# Patient Record
Sex: FEMALE | Race: WHITE | NOT HISPANIC OR LATINO
[De-identification: names, ages, dates, MRNs, and addresses within clinical notes are randomized per-mention and may not be internally consistent; named-entity substitution may affect disease eponyms.]

---

## 2017-06-13 ENCOUNTER — APPOINTMENT (OUTPATIENT)
Dept: DERMATOLOGY | Facility: CLINIC | Age: 47
End: 2017-06-13

## 2017-06-30 ENCOUNTER — APPOINTMENT (OUTPATIENT)
Dept: INTERNAL MEDICINE | Facility: CLINIC | Age: 47
End: 2017-06-30

## 2017-06-30 VITALS
RESPIRATION RATE: 14 BRPM | WEIGHT: 86 LBS | DIASTOLIC BLOOD PRESSURE: 90 MMHG | OXYGEN SATURATION: 99 % | SYSTOLIC BLOOD PRESSURE: 150 MMHG | HEIGHT: 61 IN | BODY MASS INDEX: 16.24 KG/M2 | TEMPERATURE: 98.8 F | HEART RATE: 83 BPM

## 2017-06-30 VITALS — SYSTOLIC BLOOD PRESSURE: 148 MMHG | DIASTOLIC BLOOD PRESSURE: 88 MMHG

## 2017-06-30 DIAGNOSIS — K21.9 GASTRO-ESOPHAGEAL REFLUX DISEASE W/OUT ESOPHAGITIS: ICD-10-CM

## 2017-06-30 DIAGNOSIS — R49.0 DYSPHONIA: ICD-10-CM

## 2017-06-30 DIAGNOSIS — Z78.0 ASYMPTOMATIC MENOPAUSAL STATE: ICD-10-CM

## 2017-10-26 ENCOUNTER — TRANSCRIPTION ENCOUNTER (OUTPATIENT)
Age: 47
End: 2017-10-26

## 2017-11-08 ENCOUNTER — TRANSCRIPTION ENCOUNTER (OUTPATIENT)
Age: 47
End: 2017-11-08

## 2017-12-26 ENCOUNTER — TRANSCRIPTION ENCOUNTER (OUTPATIENT)
Age: 47
End: 2017-12-26

## 2018-02-05 ENCOUNTER — TRANSCRIPTION ENCOUNTER (OUTPATIENT)
Age: 48
End: 2018-02-05

## 2018-02-16 ENCOUNTER — TRANSCRIPTION ENCOUNTER (OUTPATIENT)
Age: 48
End: 2018-02-16

## 2018-07-27 ENCOUNTER — TRANSCRIPTION ENCOUNTER (OUTPATIENT)
Age: 48
End: 2018-07-27

## 2019-03-12 ENCOUNTER — LABORATORY RESULT (OUTPATIENT)
Age: 49
End: 2019-03-12

## 2019-03-12 ENCOUNTER — APPOINTMENT (OUTPATIENT)
Dept: INTERNAL MEDICINE | Facility: CLINIC | Age: 49
End: 2019-03-12
Payer: COMMERCIAL

## 2019-03-12 ENCOUNTER — NON-APPOINTMENT (OUTPATIENT)
Age: 49
End: 2019-03-12

## 2019-03-12 VITALS
DIASTOLIC BLOOD PRESSURE: 70 MMHG | RESPIRATION RATE: 14 BRPM | SYSTOLIC BLOOD PRESSURE: 100 MMHG | HEIGHT: 61 IN | OXYGEN SATURATION: 97 % | WEIGHT: 80 LBS | BODY MASS INDEX: 15.11 KG/M2 | TEMPERATURE: 98 F | HEART RATE: 60 BPM

## 2019-03-12 DIAGNOSIS — R63.0 ANOREXIA: ICD-10-CM

## 2019-03-12 PROCEDURE — 99396 PREV VISIT EST AGE 40-64: CPT | Mod: 25

## 2019-03-12 PROCEDURE — 93000 ELECTROCARDIOGRAM COMPLETE: CPT

## 2019-03-12 RX ORDER — HYDROXYZINE PAMOATE 50 MG/1
50 CAPSULE ORAL
Qty: 90 | Refills: 0 | Status: DISCONTINUED | COMMUNITY
Start: 2017-06-26 | End: 2019-03-12

## 2019-03-12 RX ORDER — GABAPENTIN 400 MG/1
400 CAPSULE ORAL
Qty: 90 | Refills: 0 | Status: DISCONTINUED | COMMUNITY
Start: 2017-03-27 | End: 2019-03-12

## 2019-03-12 RX ORDER — OLANZAPINE 5 MG/1
5 TABLET, FILM COATED ORAL
Qty: 90 | Refills: 0 | Status: DISCONTINUED | COMMUNITY
Start: 2017-03-27 | End: 2019-03-12

## 2019-03-12 RX ORDER — FLUOXETINE HYDROCHLORIDE 10 MG/1
10 CAPSULE ORAL
Qty: 30 | Refills: 0 | Status: DISCONTINUED | COMMUNITY
Start: 2017-06-05 | End: 2019-03-12

## 2019-03-12 RX ORDER — QUETIAPINE FUMARATE 50 MG/1
50 TABLET ORAL
Qty: 60 | Refills: 0 | Status: DISCONTINUED | COMMUNITY
Start: 2017-06-23 | End: 2019-03-12

## 2019-03-12 RX ORDER — HYDROXYZINE PAMOATE 25 MG/1
25 CAPSULE ORAL
Qty: 20 | Refills: 0 | Status: DISCONTINUED | COMMUNITY
Start: 2017-04-28 | End: 2019-03-12

## 2019-03-12 RX ORDER — GABAPENTIN 300 MG/1
300 CAPSULE ORAL
Qty: 90 | Refills: 0 | Status: DISCONTINUED | COMMUNITY
Start: 2017-04-28 | End: 2019-03-12

## 2019-03-12 RX ORDER — OLANZAPINE 10 MG/1
10 TABLET, FILM COATED ORAL
Qty: 30 | Refills: 0 | Status: DISCONTINUED | COMMUNITY
Start: 2017-03-27 | End: 2019-03-12

## 2019-03-12 RX ORDER — FLUOXETINE HYDROCHLORIDE 20 MG/1
20 CAPSULE ORAL
Qty: 30 | Refills: 0 | Status: DISCONTINUED | COMMUNITY
Start: 2017-05-24 | End: 2019-03-12

## 2019-03-12 RX ORDER — VENLAFAXINE HYDROCHLORIDE 37.5 MG/1
37.5 CAPSULE, EXTENDED RELEASE ORAL
Qty: 30 | Refills: 0 | Status: DISCONTINUED | COMMUNITY
Start: 2017-04-06 | End: 2019-03-12

## 2019-03-12 RX ORDER — BUSPIRONE HYDROCHLORIDE 15 MG/1
15 TABLET ORAL
Qty: 180 | Refills: 0 | Status: DISCONTINUED | COMMUNITY
Start: 2017-05-17 | End: 2019-03-12

## 2019-03-12 RX ORDER — ZOLPIDEM TARTRATE 6.25 MG/1
6.25 TABLET, EXTENDED RELEASE ORAL
Qty: 60 | Refills: 0 | Status: DISCONTINUED | COMMUNITY
Start: 2017-05-01 | End: 2019-03-12

## 2019-03-12 RX ORDER — QUETIAPINE FUMARATE 25 MG/1
25 TABLET ORAL
Qty: 90 | Refills: 0 | Status: DISCONTINUED | COMMUNITY
Start: 2017-06-26 | End: 2019-03-12

## 2019-03-12 NOTE — REVIEW OF SYSTEMS
[Recent Change In Weight] : ~T recent weight change [Insomnia] : insomnia [Anxiety] : anxiety [Negative] : Heme/Lymph [Fever] : no fever [Chills] : no chills [Fatigue] : no fatigue [Headache] : no headache [Dizziness] : no dizziness [FreeTextEntry9] : shoulder pain

## 2019-03-12 NOTE — PHYSICAL EXAM
[No Acute Distress] : no acute distress [Well Nourished] : well nourished [Well Developed] : well developed [Well-Appearing] : well-appearing [Normal Sclera/Conjunctiva] : normal sclera/conjunctiva [PERRL] : pupils equal round and reactive to light [Normal Oropharynx] : the oropharynx was normal [Normal TMs] : both tympanic membranes were normal [Supple] : supple [No Lymphadenopathy] : no lymphadenopathy [Thyroid Normal, No Nodules] : the thyroid was normal and there were no nodules present [No Respiratory Distress] : no respiratory distress  [Clear to Auscultation] : lungs were clear to auscultation bilaterally [Normal Rate] : normal rate  [Regular Rhythm] : with a regular rhythm [Normal S1, S2] : normal S1 and S2 [No Edema] : there was no peripheral edema [Normal Appearance] : normal in appearance [No Masses] : no palpable masses [Soft] : abdomen soft [Non Tender] : non-tender [No CVA Tenderness] : no CVA  tenderness [No Spinal Tenderness] : no spinal tenderness [No Joint Swelling] : no joint swelling [Grossly Normal Strength/Tone] : grossly normal strength/tone [No Rash] : no rash [Normal Gait] : normal gait [Coordination Grossly Intact] : coordination grossly intact [No Focal Deficits] : no focal deficits [Deep Tendon Reflexes (DTR)] : deep tendon reflexes were 2+ and symmetric [Normal Affect] : the affect was normal [Normal Insight/Judgement] : insight and judgment were intact [Cachectic] : cachexia was observed

## 2019-03-12 NOTE — HEALTH RISK ASSESSMENT
[0] : 2) Feeling down, depressed, or hopeless: Not at all (0) [] : No [de-identified] : one drink at dinner weekly

## 2019-03-12 NOTE — HISTORY OF PRESENT ILLNESS
[FreeTextEntry1] : cpe [de-identified] : Pt is here for cpe. She was in [place] with her family and then last week had the flu. She continues to have a little hoarseness. \par \par Pt is seeing a psychologist weekly for the last year and a psychiatrist - Dr Augustin. \par \par LMP was before she had children. Her children were conceived with donor eggs. her children are 6.5 and almost 9. Did have regular periods. She had some postmenopausal bleeding and was seen by gyn recently.

## 2019-03-13 DIAGNOSIS — Z00.00 ENCOUNTER FOR GENERAL ADULT MEDICAL EXAMINATION W/OUT ABNORMAL FINDINGS: ICD-10-CM

## 2019-03-13 LAB
25(OH)D3 SERPL-MCNC: 48.7 NG/ML
ALBUMIN SERPL ELPH-MCNC: 5.2 G/DL
ALP BLD-CCNC: 65 U/L
ALT SERPL-CCNC: 20 U/L
ANION GAP SERPL CALC-SCNC: 14 MMOL/L
APPEARANCE: CLEAR
AST SERPL-CCNC: 21 U/L
BASOPHILS # BLD AUTO: 0.02 K/UL
BASOPHILS NFR BLD AUTO: 0.4 %
BILIRUB SERPL-MCNC: 0.6 MG/DL
BILIRUBIN URINE: NEGATIVE
BLOOD URINE: NEGATIVE
BUN SERPL-MCNC: 13 MG/DL
CALCIUM SERPL-MCNC: 10.5 MG/DL
CHLORIDE SERPL-SCNC: 98 MMOL/L
CHOLEST SERPL-MCNC: 221 MG/DL
CHOLEST/HDLC SERPL: 2.3 RATIO
CO2 SERPL-SCNC: 27 MMOL/L
COLOR: YELLOW
CREAT SERPL-MCNC: 0.67 MG/DL
EOSINOPHIL # BLD AUTO: 0.08 K/UL
EOSINOPHIL NFR BLD AUTO: 1.7 %
FERRITIN SERPL-MCNC: 99 NG/ML
FOLATE SERPL-MCNC: >20 NG/ML
GLIADIN IGA SER QL: 6.1 UNITS
GLIADIN IGG SER QL: <5 UNITS
GLIADIN PEPTIDE IGA SER-ACNC: NEGATIVE
GLIADIN PEPTIDE IGG SER-ACNC: NEGATIVE
GLUCOSE QUALITATIVE U: NEGATIVE
GLUCOSE SERPL-MCNC: 84 MG/DL
HBA1C MFR BLD HPLC: 4.4 %
HCT VFR BLD CALC: 44.2 %
HDLC SERPL-MCNC: 96 MG/DL
HGB BLD-MCNC: 15.4 G/DL
IGA SER QL IEP: 250 MG/DL
IMM GRANULOCYTES NFR BLD AUTO: 0.2 %
IRON SATN MFR SERPL: 63 %
IRON SERPL-MCNC: 202 UG/DL
KETONES URINE: NEGATIVE
LDLC SERPL CALC-MCNC: 100 MG/DL
LEUKOCYTE ESTERASE URINE: ABNORMAL
LYMPHOCYTES # BLD AUTO: 1.74 K/UL
LYMPHOCYTES NFR BLD AUTO: 36.9 %
MAGNESIUM SERPL-MCNC: 1.7 MG/DL
MAN DIFF?: NORMAL
MCHC RBC-ENTMCNC: 33.8 PG
MCHC RBC-ENTMCNC: 34.8 GM/DL
MCV RBC AUTO: 97.1 FL
MONOCYTES # BLD AUTO: 0.42 K/UL
MONOCYTES NFR BLD AUTO: 8.9 %
NEUTROPHILS # BLD AUTO: 2.44 K/UL
NEUTROPHILS NFR BLD AUTO: 51.9 %
NITRITE URINE: NEGATIVE
PH URINE: 6
PHOSPHATE SERPL-MCNC: 4.7 MG/DL
PLATELET # BLD AUTO: 247 K/UL
POTASSIUM SERPL-SCNC: 4.5 MMOL/L
PROT SERPL-MCNC: 7.5 G/DL
PROTEIN URINE: ABNORMAL
RBC # BLD: 4.55 M/UL
RBC # BLD: 4.55 M/UL
RBC # FLD: 12.2 %
RETICS # AUTO: 1.8 %
RETICS AGGREG/RBC NFR: 81.9 K/UL
SODIUM SERPL-SCNC: 139 MMOL/L
SPECIFIC GRAVITY URINE: 1.04
TIBC SERPL-MCNC: 320 UG/DL
TRIGL SERPL-MCNC: 124 MG/DL
TSH SERPL-ACNC: 2.91 UIU/ML
TTG IGA SER IA-ACNC: <1.2 U/ML
TTG IGA SER-ACNC: NEGATIVE
TTG IGG SER IA-ACNC: 1.3 U/ML
TTG IGG SER IA-ACNC: NEGATIVE
UIBC SERPL-MCNC: 118 UG/DL
URATE SERPL-MCNC: 3.7 MG/DL
UROBILINOGEN URINE: NORMAL
VIT B12 SERPL-MCNC: 682 PG/ML
WBC # FLD AUTO: 4.71 K/UL

## 2019-03-15 LAB
ENDOMYSIUM IGA SER QL: NEGATIVE
ENDOMYSIUM IGA TITR SER: NORMAL

## 2019-03-17 ENCOUNTER — TRANSCRIPTION ENCOUNTER (OUTPATIENT)
Age: 49
End: 2019-03-17

## 2019-04-04 ENCOUNTER — FORM ENCOUNTER (OUTPATIENT)
Age: 49
End: 2019-04-04

## 2019-04-05 ENCOUNTER — APPOINTMENT (OUTPATIENT)
Dept: RADIOLOGY | Facility: CLINIC | Age: 49
End: 2019-04-05
Payer: COMMERCIAL

## 2019-04-05 ENCOUNTER — APPOINTMENT (OUTPATIENT)
Dept: MAMMOGRAPHY | Facility: CLINIC | Age: 49
End: 2019-04-05
Payer: COMMERCIAL

## 2019-04-05 ENCOUNTER — OUTPATIENT (OUTPATIENT)
Dept: OUTPATIENT SERVICES | Facility: HOSPITAL | Age: 49
LOS: 1 days | End: 2019-04-05
Payer: COMMERCIAL

## 2019-04-05 DIAGNOSIS — N80.9 ENDOMETRIOSIS, UNSPECIFIED: Chronic | ICD-10-CM

## 2019-04-05 DIAGNOSIS — Z00.00 ENCOUNTER FOR GENERAL ADULT MEDICAL EXAMINATION WITHOUT ABNORMAL FINDINGS: ICD-10-CM

## 2019-04-05 PROCEDURE — 77063 BREAST TOMOSYNTHESIS BI: CPT

## 2019-04-05 PROCEDURE — 77067 SCR MAMMO BI INCL CAD: CPT

## 2019-04-05 PROCEDURE — 77063 BREAST TOMOSYNTHESIS BI: CPT | Mod: 26

## 2019-04-05 PROCEDURE — 77080 DXA BONE DENSITY AXIAL: CPT | Mod: 26

## 2019-04-05 PROCEDURE — 77067 SCR MAMMO BI INCL CAD: CPT | Mod: 26

## 2019-04-05 PROCEDURE — 77080 DXA BONE DENSITY AXIAL: CPT

## 2019-05-10 PROCEDURE — 90792 PSYCH DIAG EVAL W/MED SRVCS: CPT | Mod: GT

## 2019-05-11 ENCOUNTER — EMERGENCY (EMERGENCY)
Facility: HOSPITAL | Age: 49
LOS: 1 days | Discharge: HOME | End: 2019-05-11
Attending: INTERNAL MEDICINE | Admitting: INTERNAL MEDICINE
Payer: COMMERCIAL

## 2019-05-11 VITALS
OXYGEN SATURATION: 100 % | HEART RATE: 60 BPM | SYSTOLIC BLOOD PRESSURE: 127 MMHG | WEIGHT: 84 LBS | RESPIRATION RATE: 18 BRPM | DIASTOLIC BLOOD PRESSURE: 84 MMHG | TEMPERATURE: 98 F | HEIGHT: 60 IN

## 2019-05-11 VITALS
RESPIRATION RATE: 16 BRPM | OXYGEN SATURATION: 99 % | DIASTOLIC BLOOD PRESSURE: 60 MMHG | TEMPERATURE: 98 F | SYSTOLIC BLOOD PRESSURE: 99 MMHG | HEART RATE: 70 BPM

## 2019-05-11 DIAGNOSIS — N80.9 ENDOMETRIOSIS, UNSPECIFIED: Chronic | ICD-10-CM

## 2019-05-11 DIAGNOSIS — F13.10 SEDATIVE, HYPNOTIC OR ANXIOLYTIC ABUSE, UNCOMPLICATED: ICD-10-CM

## 2019-05-11 DIAGNOSIS — F41.0 PANIC DISORDER [EPISODIC PAROXYSMAL ANXIETY]: ICD-10-CM

## 2019-05-11 LAB
ALBUMIN SERPL ELPH-MCNC: 4 G/DL — SIGNIFICANT CHANGE UP (ref 3.3–5)
ALP SERPL-CCNC: 65 U/L — SIGNIFICANT CHANGE UP (ref 30–120)
ALT FLD-CCNC: 47 U/L DA — SIGNIFICANT CHANGE UP (ref 10–60)
AMPHET UR-MCNC: NEGATIVE — SIGNIFICANT CHANGE UP
ANION GAP SERPL CALC-SCNC: 6 MMOL/L — SIGNIFICANT CHANGE UP (ref 5–17)
APAP SERPL-MCNC: 5 UG/ML — LOW (ref 10–30)
APPEARANCE UR: CLEAR — SIGNIFICANT CHANGE UP
APTT BLD: 29.5 SEC — SIGNIFICANT CHANGE UP (ref 28.5–37)
AST SERPL-CCNC: 56 U/L — HIGH (ref 10–40)
BARBITURATES UR SCN-MCNC: NEGATIVE — SIGNIFICANT CHANGE UP
BASOPHILS # BLD AUTO: 0.02 K/UL — SIGNIFICANT CHANGE UP (ref 0–0.2)
BASOPHILS NFR BLD AUTO: 0.2 % — SIGNIFICANT CHANGE UP (ref 0–2)
BENZODIAZ UR-MCNC: POSITIVE
BILIRUB SERPL-MCNC: 0.8 MG/DL — SIGNIFICANT CHANGE UP (ref 0.2–1.2)
BILIRUB UR-MCNC: NEGATIVE — SIGNIFICANT CHANGE UP
BUN SERPL-MCNC: 7 MG/DL — SIGNIFICANT CHANGE UP (ref 7–23)
CALCIUM SERPL-MCNC: 8.5 MG/DL — SIGNIFICANT CHANGE UP (ref 8.4–10.5)
CHLORIDE SERPL-SCNC: 90 MMOL/L — LOW (ref 96–108)
CO2 SERPL-SCNC: 33 MMOL/L — HIGH (ref 22–31)
COCAINE METAB.OTHER UR-MCNC: NEGATIVE — SIGNIFICANT CHANGE UP
COLOR SPEC: YELLOW — SIGNIFICANT CHANGE UP
CREAT SERPL-MCNC: 0.52 MG/DL — SIGNIFICANT CHANGE UP (ref 0.5–1.3)
DIFF PNL FLD: NEGATIVE — SIGNIFICANT CHANGE UP
EOSINOPHIL # BLD AUTO: 0.07 K/UL — SIGNIFICANT CHANGE UP (ref 0–0.5)
EOSINOPHIL NFR BLD AUTO: 0.7 % — SIGNIFICANT CHANGE UP (ref 0–6)
ETHANOL SERPL-MCNC: <3 MG/DL — SIGNIFICANT CHANGE UP (ref 0–3)
GLUCOSE SERPL-MCNC: 73 MG/DL — SIGNIFICANT CHANGE UP (ref 70–99)
GLUCOSE UR QL: NEGATIVE MG/DL — SIGNIFICANT CHANGE UP
HCT VFR BLD CALC: 34.9 % — SIGNIFICANT CHANGE UP (ref 34.5–45)
HGB BLD-MCNC: 13 G/DL — SIGNIFICANT CHANGE UP (ref 11.5–15.5)
IMM GRANULOCYTES NFR BLD AUTO: 0.5 % — SIGNIFICANT CHANGE UP (ref 0–1.5)
INR BLD: 1.12 RATIO — SIGNIFICANT CHANGE UP (ref 0.88–1.16)
KETONES UR-MCNC: NEGATIVE — SIGNIFICANT CHANGE UP
LEUKOCYTE ESTERASE UR-ACNC: NEGATIVE — SIGNIFICANT CHANGE UP
LYMPHOCYTES # BLD AUTO: 1.37 K/UL — SIGNIFICANT CHANGE UP (ref 1–3.3)
LYMPHOCYTES # BLD AUTO: 13 % — SIGNIFICANT CHANGE UP (ref 13–44)
MCHC RBC-ENTMCNC: 34.7 PG — HIGH (ref 27–34)
MCHC RBC-ENTMCNC: 37.2 GM/DL — HIGH (ref 32–36)
MCV RBC AUTO: 93.1 FL — SIGNIFICANT CHANGE UP (ref 80–100)
METHADONE UR-MCNC: NEGATIVE — SIGNIFICANT CHANGE UP
MONOCYTES # BLD AUTO: 0.7 K/UL — SIGNIFICANT CHANGE UP (ref 0–0.9)
MONOCYTES NFR BLD AUTO: 6.6 % — SIGNIFICANT CHANGE UP (ref 2–14)
NEUTROPHILS # BLD AUTO: 8.32 K/UL — HIGH (ref 1.8–7.4)
NEUTROPHILS NFR BLD AUTO: 79 % — HIGH (ref 43–77)
NITRITE UR-MCNC: NEGATIVE — SIGNIFICANT CHANGE UP
NRBC # BLD: 0 /100 WBCS — SIGNIFICANT CHANGE UP (ref 0–0)
OPIATES UR-MCNC: NEGATIVE — SIGNIFICANT CHANGE UP
PCP SPEC-MCNC: SIGNIFICANT CHANGE UP
PCP UR-MCNC: NEGATIVE — SIGNIFICANT CHANGE UP
PH UR: 6.5 — SIGNIFICANT CHANGE UP (ref 5–8)
PLATELET # BLD AUTO: 176 K/UL — SIGNIFICANT CHANGE UP (ref 150–400)
POTASSIUM SERPL-MCNC: 3.8 MMOL/L — SIGNIFICANT CHANGE UP (ref 3.5–5.3)
POTASSIUM SERPL-SCNC: 3.8 MMOL/L — SIGNIFICANT CHANGE UP (ref 3.5–5.3)
PROT SERPL-MCNC: 6.7 G/DL — SIGNIFICANT CHANGE UP (ref 6–8.3)
PROT UR-MCNC: NEGATIVE MG/DL — SIGNIFICANT CHANGE UP
PROTHROM AB SERPL-ACNC: 12.3 SEC — SIGNIFICANT CHANGE UP (ref 10–12.9)
RBC # BLD: 3.75 M/UL — LOW (ref 3.8–5.2)
RBC # FLD: 10.4 % — SIGNIFICANT CHANGE UP (ref 10.3–14.5)
SALICYLATES SERPL-MCNC: <0.2 MG/DL — LOW (ref 2.8–20)
SODIUM SERPL-SCNC: 129 MMOL/L — LOW (ref 135–145)
SP GR SPEC: 1 — LOW (ref 1.01–1.02)
THC UR QL: NEGATIVE — SIGNIFICANT CHANGE UP
UROBILINOGEN FLD QL: NEGATIVE MG/DL — SIGNIFICANT CHANGE UP
WBC # BLD: 10.53 K/UL — HIGH (ref 3.8–10.5)
WBC # FLD AUTO: 10.53 K/UL — HIGH (ref 3.8–10.5)

## 2019-05-11 PROCEDURE — 85027 COMPLETE CBC AUTOMATED: CPT

## 2019-05-11 PROCEDURE — 80307 DRUG TEST PRSMV CHEM ANLYZR: CPT

## 2019-05-11 PROCEDURE — 93005 ELECTROCARDIOGRAM TRACING: CPT

## 2019-05-11 PROCEDURE — 99285 EMERGENCY DEPT VISIT HI MDM: CPT | Mod: 25

## 2019-05-11 PROCEDURE — 93010 ELECTROCARDIOGRAM REPORT: CPT

## 2019-05-11 PROCEDURE — 99285 EMERGENCY DEPT VISIT HI MDM: CPT

## 2019-05-11 PROCEDURE — 80053 COMPREHEN METABOLIC PANEL: CPT

## 2019-05-11 PROCEDURE — 85730 THROMBOPLASTIN TIME PARTIAL: CPT

## 2019-05-11 PROCEDURE — 85610 PROTHROMBIN TIME: CPT

## 2019-05-11 PROCEDURE — 81003 URINALYSIS AUTO W/O SCOPE: CPT

## 2019-05-11 PROCEDURE — 36415 COLL VENOUS BLD VENIPUNCTURE: CPT

## 2019-05-11 NOTE — ED ADULT NURSE REASSESSMENT NOTE - NS ED NURSE REASSESS COMMENT FT1
Pt requesting an Rx from ED physician for Valium. Pt states she does not have any more at home and needs some. Pt actually has bag of small blue pills loose in a Zipzoc bag, approximately 30; Pt states they are 5mg Valium and that she takes 8 per day so these will not last. Pt advised to call and schedule an appointment to see her PMD.

## 2019-05-11 NOTE — ED PROVIDER NOTE - OBJECTIVE STATEMENT
47 y/o WF h/o alcohol abuse, drug dependance, depression. States that after taking her usual dose of valium 40mg,  she was experiencing increasing shakiness. She took extra unknown  doses of the valium to control the shaking. Her concern is that the valium 10mg tabs from the [facility] has a different potency or that she is taking an unknown substance. She admits to drinking alcohol today after a prolonged abstinence. She is in the emergency room with a friend. The patient is not expressing homicidal or suicidal ideation.  She has two children at home and they are in the care of her  49 y/o WF h/o alcohol abuse, drug dependance, depression. States that after taking her usual dose of valium 40mg and lexapro,  she was experiencing increasing shakiness. She took extra unknown  doses of the valium to control the shaking. Her concern is that the valium 10mg tabs from the [facility] has a different potency or that she is taking an unknown substance. She admits to drinking alcohol today after a prolonged abstinence. She is in the emergency room with a friend. The patient is not expressing homicidal or suicidal ideation.  She has two children at home and they are in the care of her . 47 y/o WF h/o alcohol abuse, drug dependance, depression. States that after taking her usual dose of valium 40mg and lexapro,  she was experiencing increasing shakiness. She took extra unknown  doses of the valium to control the shaking. Her concern is that the valium 10mg tabs from the [facility] has a different potency or that she is taking an unknown substance. She admits to drinking alcohol today after a prolonged abstinence. She is in the emergency room with a friend. The patient is not expressing homicidal or suicidal ideation.  She has two children at home and they are in the care of her .  The patient is carrying approximately 30-40 tabs of valium 10mg in a zip lock bag.

## 2019-05-11 NOTE — ED ADULT NURSE NOTE - NSIMPLEMENTINTERV_GEN_ALL_ED
Implemented All Fall with Harm Risk Interventions:  [place] to call system. Call bell, personal items and telephone within reach. Instruct patient to call for assistance. Room bathroom lighting operational. Non-slip footwear when patient is off stretcher. Physically safe environment: no spills, clutter or unnecessary equipment. Stretcher in lowest position, wheels locked, appropriate side rails in place. Provide visual cue, wrist band, yellow gown, etc. Monitor gait and stability. Monitor for mental status changes and reorient to person, place, and time. Review medications for side effects contributing to fall risk. Reinforce activity limits and safety measures with patient and family. Provide visual clues: red socks.

## 2019-05-11 NOTE — ED BEHAVIORAL HEALTH ASSESSMENT NOTE - DESCRIPTION (FIRST USE, LAST USE, QUANTITY, FREQUENCY, DURATION)
h/o ETOH abuse and [facility]. was sober for 11mo after than and then began to drink socially with  in the evenings or out with friends

## 2019-05-11 NOTE — ED BEHAVIORAL HEALTH ASSESSMENT NOTE - OTHER PAST PSYCHIATRIC HISTORY (INCLUDE DETAILS REGARDING ONSET, COURSE OF ILLNESS, INPATIENT/OUTPATIENT TREATMENT)
has been in therapy and in outpatient med management since childhood for anxiety. family stated that pt has a h/o eating disorder but pt does not report this.     see HPI for more details     message left for RAISSA Blevins [phone]

## 2019-05-11 NOTE — ED ADULT NURSE NOTE - OBJECTIVE STATEMENT
"I took valium and now I can't see" "I took valium and now I can't see" pt ststes she gets valium from [facility], states she took several pills today with alcohol and ambien

## 2019-05-11 NOTE — ED BEHAVIORAL HEALTH ASSESSMENT NOTE - HPI (INCLUDE ILLNESS QUALITY, SEVERITY, DURATION, TIMING, CONTEXT, MODIFYING FACTORS, ASSOCIATED SIGNS AND SYMPTOMS)
47yo MWF, domiciled with her  and 2 children, homemaker, medical hx of alopecia and ulcers, pphx of long standing anxiety from childhood and h/o eating disorder, 1 prior ER visit after pt threatened to cut her wrist after rehab when she was tappered off benzo's, recently switched providers from Dr. Parks to RAISSA Blevins after being dismissed from the practice for unknown reason, in therapy for several yrs with Dr. Baez, currently on lexapro, ambien and obtains valium from the [facility], no h/o SA or SIB, h/o ETOH abuse with 1 prior rehab at [facility] 2yrs ago, no h/o withdrawal, no legal hx, no violence hx, presented to the ER with high anxiety because she feels that she had an adverse reaction to valium.    The pt states that she has been tried on multiple anti-anxiety medications and antidepressants but nothing worked as well as valium. 2rys ago her anxiety increased and she began drinking during the day. after going to rehab, she was tapered off valium and states that she could not find a doctor to prescribe it. she reports becoming highly anxious, not being able to sleep and needing a family member to be home with her at all times. out of desperation she said that she would cut her wrist without the benzo's (denied intent) but family called 911 and brought her to [facility]. pt then googled how to obtain valium [place] past 2 [place]. pt said that she has been the happiest she has ever been on valium 40mg in divided doses daily. she states that her anxiety was vastly improved and allowed her to be a good mother. despite this, pt has felt guilty about keeping this secret and it became a burden to hide these pills. pt said that she told her long time therapist about this just last week. unclear if the reason pt was discharged from her psychiatrist was related to this.  pt said that today out of the blue she bought a travel sized bottle of alcohol and then could not do her usual breathalizer (does this TID and facetimes with her ). she tried to make excuses for this but then had a panic attack and called a friend who brought her to the ER. pt's view is that she had bad valium which made her anxious. in the ER the pt is asking for valium, wanting to know which providers can prescribe for her and anxious that she will have a withdrawal seizure. she is not interested in rehab/detox.  pt is denying depressed mood, si/intent/plan (nursing note says SI but pt denied this to ER attending and writer). normally pt is able to sleep well, complete ADLs and focus. she is anxious to get home before her children wake up and they are her reason for living. she also has a supportive family with siblings and parents.   denied symptoms of bradley including decreased need for sleep, increased goal directed activity, grandiosity. no psychotic symptoms of avh or paranoid delusions. no HI.    Daniela Blevins [phone]

## 2019-05-11 NOTE — ED BEHAVIORAL HEALTH ASSESSMENT NOTE - RISK ASSESSMENT
Acute Suicide Risk  (  ) High   (x  ) Moderate   (  ) Low   (  ) Unable to determine   Rationale ___________moderate to low risk. not reporting any SI however has made provocative statements about cutting wrist when feeling desperate that she will not have her benzo's and this may be a possibility now that her family is aware that she is obtaining them illegally. pt firmly denies any intent to end her life, has a supportive family and children to live for. she has no h/o SA or SIB     Elevated Chronic Risk   ( x ) Yes ___________substance abuse with poor insight is a chronic risk factor  Details ___________  (  ) No   ___________     Safety Plan   Details: ___________  [  ] Safety plan discussed with patient  [  ] Education provided regarding environmental safety / lethal means restriction  [  ] Provision of National Suicide Prevention Lifeline 0-084-330-TALK (7515)

## 2019-05-11 NOTE — ED BEHAVIORAL HEALTH ASSESSMENT NOTE - DESCRIPTION
VITAL SIGNS (Last 24 hrs):  T(C): 36.7 (05-11-19 @ 00:26), Max: 36.7 (05-11-19 @ 00:26)  HR: 66 (05-11-19 @ 03:43) (60 - 90)  BP: 98/65 (05-11-19 @ 03:43) (93/60 - 127/84)  RR: 17 (05-11-19 @ 03:43) (16 - 18)  SpO2: 96% (05-11-19 @ 03:43) (96% - 100%)  Wt(kg): --  Daily Height in cm: 152.4 (11 May 2019 00:26)      ED Course: Per chart review, ED staff:  Patient brought to ED by friend at 0:22; telepsychiatry consulted at 3:12. Patient presented as neat and cleanly. Patient reported to triage nurse that she took valium and now she could not see. Patient was cooperative with triage process. Patient provided urine and blood specimens willingly. Patient changed into hospital gown without incident; patient displayed confusion in regards to who to give her belongings to. Per primary nurse patient presents as anxious, but is able to sit still on stretcher. Speech is noted to be at normal rate and volume. Patient is confused at times but logical. Patient is alert and oriented x3, reports difficulty with short term memory loss. Patient denies SI/HI. Per primary nurse, patient continues to request valium through the duration of ED visit. Patient is in good behavioral control in ED, not requiring medication or security intervention. Patient is accompanied in ED by mother and father and appears calmed by their presence.       Collateral Sources  (1) Prashanth Souza, mother and father, [phone]: evasive, mother repeatedly stated that she does not live with patient and would not be able to provide information and reported that we should not wake up patient’s  because he was at home with the two children. Denies acute safety concerns, reports that patient is just anxious but is able to go home.     Capsule Sentence: BTA driven    HPI: Per Parents:   Reports patient is at baseline; collateral reports long history of anxiety, mother reporting this is just patient’s nature. 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 Patient attends therapy at baseline and is prescribed Lexapro and Ambein. Father reports that patient has been taking valium, unprescribed, for years; reports that patient is afraid that no one will prescribe it to her. Father endorsed that patient has had anxiety for years and has a history of eating disorder in youth; citing ulcer at age 21. Per parents, patient was feeling very anxious today, shaky at times and contacted her doctor who encouraged her to come to ED. Patient was brought to ED by friend. Mother and father deny any recent stressors. Mother denies depressed mood, anhedonia, changes in energy/concentration/appetite, sleep disturbances, feelings of guilt, elevated mood, increased irritability, mood lability, distractibility, grandiosity, pressured speech, increase in goal-directed activity, decreased need for sleep, paranoia, ideas of reference, thought insertion/broadcasting, or auditory/visual/olfactory/tactile/gustatory hallucinations. Collateral denies verbalization of suicidality and homicidality. Denies history of violence and aggression. Denies trauma history. Denies substance use, reports only social alcohol use. Patient has not had any past psychiatric hospitalizations. No hx of suicidality, suicidal attempts or self- injurious behavior in the past. Denies acute safety concerns, reports that patient is just anxious but is able to go home. h/o ulcers and alopecia lives with  of 16yrs and 2 children aged 10 and 7yo. pt is a homemaker. she has supportive parents and siblings

## 2019-05-11 NOTE — ED BEHAVIORAL HEALTH ASSESSMENT NOTE - MEDICATIONS (PRESCRIPTIONS, DIRECTIONS)
discussed with ER attending that further investigation is needed about where pt's pills are and if she still has access to them. discussed that benzo's cannot be abruptly stopped or there is risk of withdrawal seizures. pt understands to return to the ER if does not have benzo's and is having signs of withdrawal. explained to pt that 1mo supply cannot be prescribed from the ER. pt is uninterested in a danny at this time

## 2019-05-11 NOTE — ED PROVIDER NOTE - PROGRESS NOTE DETAILS
tele psych Dr Saldaña notified tele psych evaluation, severe anxiety but not suicidal, nor homicidal. She will speak with her  who confiscated her valium and she will take her daily dose to prevent withdrawal seizure. Dr Saldaña plans to discuss details with the patients outpatient provider.

## 2019-05-11 NOTE — ED BEHAVIORAL HEALTH ASSESSMENT NOTE - SUMMARY
47yo MWF, domiciled with her  and 2 children, homemaker, medical hx of alopecia and ulcers, pphx of long standing anxiety from childhood and h/o eating disorder, 1 prior ER visit after pt threatened to cut her wrist after rehab when she was tappered off benzo's, recently switched providers from Dr. Parks to RAISSA Blevins after being dismissed from the practice for unknown reason, in therapy for several yrs with Dr. Baez, currently on lexapro, ambien and obtains valium from the [facility], no h/o SA or SIB, h/o ETOH abuse with 1 prior rehab at [facility] 2yrs ago, no h/o withdrawal, no legal hx, no violence hx, presented to the ER with high anxiety because she feels that she had an adverse reaction to valium.

## 2019-07-18 ENCOUNTER — TRANSCRIPTION ENCOUNTER (OUTPATIENT)
Age: 49
End: 2019-07-18

## 2019-10-01 ENCOUNTER — LABORATORY RESULT (OUTPATIENT)
Age: 49
End: 2019-10-01

## 2019-10-01 ENCOUNTER — APPOINTMENT (OUTPATIENT)
Dept: INTERNAL MEDICINE | Facility: CLINIC | Age: 49
End: 2019-10-01
Payer: COMMERCIAL

## 2019-10-01 VITALS
BODY MASS INDEX: 16.25 KG/M2 | TEMPERATURE: 98.1 F | WEIGHT: 86 LBS | DIASTOLIC BLOOD PRESSURE: 70 MMHG | OXYGEN SATURATION: 99 % | RESPIRATION RATE: 14 BRPM | SYSTOLIC BLOOD PRESSURE: 100 MMHG | HEART RATE: 79 BPM

## 2019-10-01 DIAGNOSIS — R50.9 FEVER, UNSPECIFIED: ICD-10-CM

## 2019-10-01 PROCEDURE — 36415 COLL VENOUS BLD VENIPUNCTURE: CPT

## 2019-10-01 PROCEDURE — 99213 OFFICE O/P EST LOW 20 MIN: CPT | Mod: 25

## 2019-10-01 NOTE — HISTORY OF PRESENT ILLNESS
[de-identified] : Pt developed erythematous rash on b/l cheeks by nose over the weekend. Consulted with physician via telemedicine. Advised to get tested for lupus. Pt endorses chronic history of joint pain, fatigue, low grade fevers. No family hx of autoimmune condition. Fingers turn white when she is cold so she believes she has raynauds. Also with hoarse voice for over 6 weeks, has appt with ENT.

## 2019-10-01 NOTE — PHYSICAL EXAM
[No Acute Distress] : no acute distress [Well Nourished] : well nourished [Well Developed] : well developed [Well-Appearing] : well-appearing [Normal Sclera/Conjunctiva] : normal sclera/conjunctiva [PERRL] : pupils equal round and reactive to light [EOMI] : extraocular movements intact [Normal Outer Ear/Nose] : the outer ears and nose were normal in appearance [Normal Oropharynx] : the oropharynx was normal [No JVD] : no jugular venous distention [No Lymphadenopathy] : no lymphadenopathy [Supple] : supple [Thyroid Normal, No Nodules] : the thyroid was normal and there were no nodules present [No Respiratory Distress] : no respiratory distress  [No Accessory Muscle Use] : no accessory muscle use [Clear to Auscultation] : lungs were clear to auscultation bilaterally [Normal Rate] : normal rate  [Regular Rhythm] : with a regular rhythm [Normal S1, S2] : normal S1 and S2 [No Murmur] : no murmur heard [No Carotid Bruits] : no carotid bruits [No Abdominal Bruit] : a ~M bruit was not heard ~T in the abdomen [Pedal Pulses Present] : the pedal pulses are present [No Varicosities] : no varicosities [No Palpable Aorta] : no palpable aorta [No Edema] : there was no peripheral edema [No Extremity Clubbing/Cyanosis] : no extremity clubbing/cyanosis [Soft] : abdomen soft [Non Tender] : non-tender [Non-distended] : non-distended [No Masses] : no abdominal mass palpated [Normal Bowel Sounds] : normal bowel sounds [No HSM] : no HSM [Normal Posterior Cervical Nodes] : no posterior cervical lymphadenopathy [Normal Anterior Cervical Nodes] : no anterior cervical lymphadenopathy [No CVA Tenderness] : no CVA  tenderness [No Spinal Tenderness] : no spinal tenderness [No Joint Swelling] : no joint swelling [Grossly Normal Strength/Tone] : grossly normal strength/tone [No Rash] : no rash [Coordination Grossly Intact] : coordination grossly intact [No Focal Deficits] : no focal deficits [Normal Gait] : normal gait [Deep Tendon Reflexes (DTR)] : deep tendon reflexes were 2+ and symmetric [Normal Affect] : the affect was normal [Normal Insight/Judgement] : insight and judgment were intact

## 2019-10-01 NOTE — REVIEW OF SYSTEMS
[Fever] : fever [Fatigue] : fatigue [Hoarseness] : hoarseness [Joint Pain] : joint pain [Skin Rash] : skin rash [Negative] : Heme/Lymph

## 2019-10-04 LAB
ALBUMIN SERPL ELPH-MCNC: 4.9 G/DL
ALP BLD-CCNC: 68 U/L
ALT SERPL-CCNC: 27 U/L
ANA SER IF-ACNC: NEGATIVE
ANION GAP SERPL CALC-SCNC: 13 MMOL/L
APPEARANCE: CLEAR
AST SERPL-CCNC: 28 U/L
BACTERIA: NEGATIVE
BASOPHILS # BLD AUTO: 0.02 K/UL
BASOPHILS NFR BLD AUTO: 0.5 %
BILIRUB SERPL-MCNC: 0.4 MG/DL
BILIRUBIN URINE: NEGATIVE
BLOOD URINE: NEGATIVE
BUN SERPL-MCNC: 19 MG/DL
CALCIUM SERPL-MCNC: 9.7 MG/DL
CCP AB SER IA-ACNC: <8 UNITS
CHLORIDE SERPL-SCNC: 98 MMOL/L
CO2 SERPL-SCNC: 27 MMOL/L
COLOR: ABNORMAL
CREAT SERPL-MCNC: 0.6 MG/DL
CRP SERPL-MCNC: <0.1 MG/DL
DSDNA AB SER-ACNC: <12 IU/ML
ENA SS-A AB SER IA-ACNC: <0.2 AL
ENA SS-B AB SER IA-ACNC: <0.2 AL
EOSINOPHIL # BLD AUTO: 0.05 K/UL
EOSINOPHIL NFR BLD AUTO: 1.3 %
ERYTHROCYTE [SEDIMENTATION RATE] IN BLOOD BY WESTERGREN METHOD: 2 MM/HR
GLUCOSE QUALITATIVE U: NEGATIVE
GLUCOSE SERPL-MCNC: 84 MG/DL
HCT VFR BLD CALC: 38.6 %
HGB BLD-MCNC: 13.3 G/DL
HYALINE CASTS: 3 /LPF
IMM GRANULOCYTES NFR BLD AUTO: 0.3 %
KETONES URINE: NEGATIVE
LEUKOCYTE ESTERASE URINE: ABNORMAL
LYMPHOCYTES # BLD AUTO: 1.38 K/UL
LYMPHOCYTES NFR BLD AUTO: 34.7 %
MAN DIFF?: NORMAL
MCHC RBC-ENTMCNC: 34.5 GM/DL
MCHC RBC-ENTMCNC: 36.7 PG
MCV RBC AUTO: 106.6 FL
MICROSCOPIC-UA: NORMAL
MONOCYTES # BLD AUTO: 0.35 K/UL
MONOCYTES NFR BLD AUTO: 8.8 %
NEUTROPHILS # BLD AUTO: 2.17 K/UL
NEUTROPHILS NFR BLD AUTO: 54.4 %
NITRITE URINE: NEGATIVE
PH URINE: 6
PLATELET # BLD AUTO: 221 K/UL
POTASSIUM SERPL-SCNC: 4.4 MMOL/L
PROT SERPL-MCNC: 6.7 G/DL
PROTEIN URINE: NORMAL
RBC # BLD: 3.62 M/UL
RBC # FLD: 13.5 %
RED BLOOD CELLS URINE: 1 /HPF
RF+CCP IGG SER-IMP: NEGATIVE
RHEUMATOID FACT SER QL: <10 IU/ML
SODIUM SERPL-SCNC: 138 MMOL/L
SPECIFIC GRAVITY URINE: 1.04
SQUAMOUS EPITHELIAL CELLS: 2 /HPF
UROBILINOGEN URINE: NORMAL
WBC # FLD AUTO: 3.98 K/UL
WHITE BLOOD CELLS URINE: 5 /HPF

## 2019-10-30 ENCOUNTER — APPOINTMENT (OUTPATIENT)
Dept: RHEUMATOLOGY | Facility: CLINIC | Age: 49
End: 2019-10-30

## 2019-11-03 ENCOUNTER — TRANSCRIPTION ENCOUNTER (OUTPATIENT)
Age: 49
End: 2019-11-03

## 2019-11-04 ENCOUNTER — APPOINTMENT (OUTPATIENT)
Dept: RHEUMATOLOGY | Facility: CLINIC | Age: 49
End: 2019-11-04
Payer: COMMERCIAL

## 2019-11-04 VITALS
WEIGHT: 83 LBS | BODY MASS INDEX: 15.67 KG/M2 | OXYGEN SATURATION: 98 % | HEART RATE: 73 BPM | HEIGHT: 61 IN | SYSTOLIC BLOOD PRESSURE: 110 MMHG | DIASTOLIC BLOOD PRESSURE: 60 MMHG

## 2019-11-04 PROCEDURE — 99214 OFFICE O/P EST MOD 30 MIN: CPT | Mod: 25

## 2019-11-04 PROCEDURE — 36415 COLL VENOUS BLD VENIPUNCTURE: CPT

## 2019-11-04 PROCEDURE — 99204 OFFICE O/P NEW MOD 45 MIN: CPT | Mod: 25

## 2019-11-04 RX ORDER — DICLOFENAC SODIUM 10 MG/G
1 GEL TOPICAL
Qty: 1 | Refills: 2 | Status: ACTIVE | COMMUNITY
Start: 2019-11-04 | End: 1900-01-01

## 2019-11-04 NOTE — HISTORY OF PRESENT ILLNESS
[FreeTextEntry1] : NIKKI CAMPOS is a 49 year old woman who presents for evaluation of possible autoimmune condition. Prompted by erythematous rash in malar distribution that acutely developed 1 month ago, has now improved but still with some erythema over cheeks. No sun exposure, new topicals, preceding illness prior to rash, felt raw like a sunburn, no rash elsewhere on body, no prior similar rash. Pt has had other nonspecific rashes, including blistering on palms in 2016, saw derm, no dx, neg DIF, never recurred. \par \par + chronic, nonscarring alopecia x 1 year at least, ?attributed to malnutrition from her anorexia, has never had painful lesions.\par \par + chronic, recurrent ulcers on buccal mucosa and lips, but none on top palate. \par \par + Raynauds with cold weather, managed with gloves, no ulcerations or threatened digits. \par \par + ocular sicca, uses OTC eyes drops, optho has never given her rx drops, no abrasions\par \par +Chronic b/l knee pain but no synovitis, prolonged AM stiffness, effusions or other symmetrical joint pain. \par \par + chronic fatigue, sleeps well with Ambien, no mylagias or skin hypersensitivity, + anxiety\par \par No inflammatory low back pain, IBD, eye inflammation, psoriasis, tight skin, muscle weakness. \par \par + nonspecific intermittent fevers, chills but no night sweats. + anorexia, weight has been stable per patient. + nausea, hx of GERD and PUD but no motility issues. + chronic hoarse voice, seen by ENT. UTD with all age-appropriate malignancy screenings. \par \par SLE ROS negative for oral sicca, salivary gland swelling, photosensitivity, SOB, chest pain, serositis, abd pain, dysuria, hematuria, joint AM stiffness/synovitis, hematologic abnormalities. No hx of thrombotic events.\par \par Labs - negative VARINDER, Sjogrens, dsDNA, RF/CCP, ESR/CRP, UA, TFT\par DEXA 2019 with osteopenia, taking Ca/Vit D supplemenation at home, no fragility fx.

## 2019-11-04 NOTE — ASSESSMENT
[FreeTextEntry1] : NIKKI CAMPOS is a 49 year old woman who presents for rheumatologic evaluation. + chronic nonscarring alopecia, recurrent oral ulcers, dry eyes, mild Raynauds, and with 1 episode of erythematous rash in malar distribution, now improved and appearance more consistent with telangiectasia on exam today. + b/l knee pain, exam more consistent with OA and deconditioning, no synovitis appreciated. Also with some nonspecific symptoms of intermittent fevers/chills, nausea and GERD, fatigue, and hoarse voice. SLE screen negative on recent b/w as is RA. DDx remains broad -- pt may have a AI disorder or overlap disorder but many of the symptoms can also be attributed to stress and malnutrition from ongoing eating disorder and anxiety. \par \par - to be thorough will round out SLE, DIL, APLS w/u and check for CREST/ Scleroderma, HLA B27, ACE\par - repeat TFTs, Vit D, UA\par - check CXR for sarcoid\par - topical voltaren gel TID to knees as pt cannot have PO NSAIDs 2/2 PUD\par - opthalmology evaluation to ascertain level of dry eyes\par - f/u ENT for continued hoarseness\par - c/w Ca/Vit D supplementation\par - RTC 1 month to review

## 2019-11-04 NOTE — REVIEW OF SYSTEMS
[Fever] : fever [Chills] : chills [Feeling Poorly] : feeling poorly [Feeling Tired] : feeling tired [Dry Eyes] : dryness of the eyes [Hoarseness] : hoarseness [Heartburn] : heartburn [Arthralgias] : arthralgias [As Noted in HPI] : as noted in HPI [Anxiety] : anxiety [Negative] : Heme/Lymph

## 2019-11-05 LAB
25(OH)D3 SERPL-MCNC: 44.8 NG/ML
APPEARANCE: CLEAR
BACTERIA: NEGATIVE
BILIRUBIN URINE: NEGATIVE
BLOOD URINE: NEGATIVE
COLOR: YELLOW
CONFIRM: 27.7 SEC
CRP SERPL-MCNC: 0.12 MG/DL
DRVVT IMM 1:2 NP PPP: NORMAL
DRVVT SCREEN TO CONFIRM RATIO: 0.91 RATIO
ERYTHROCYTE [SEDIMENTATION RATE] IN BLOOD BY WESTERGREN METHOD: 5 MM/HR
GLUCOSE QUALITATIVE U: NEGATIVE
HYALINE CASTS: 2 /LPF
KETONES URINE: NEGATIVE
LEUKOCYTE ESTERASE URINE: ABNORMAL
MICROSCOPIC-UA: NORMAL
NITRITE URINE: NEGATIVE
PH URINE: 5.5
PROTEIN URINE: NORMAL
RED BLOOD CELLS URINE: 3 /HPF
SCREEN DRVVT: 27.8 SEC
SPECIFIC GRAVITY URINE: 1.03
SQUAMOUS EPITHELIAL CELLS: 2 /HPF
TSH SERPL-ACNC: 1.95 UIU/ML
UROBILINOGEN URINE: NORMAL
WHITE BLOOD CELLS URINE: 5 /HPF

## 2019-11-06 LAB
B2 GLYCOPROT1 IGA SERPL IA-ACNC: <5 SAU
B2 GLYCOPROT1 IGG SER-ACNC: <5 SGU
B2 GLYCOPROT1 IGM SER-ACNC: <5 SMU
C3 SERPL-MCNC: 96 MG/DL
C4 SERPL-MCNC: 17 MG/DL
CARDIOLIPIN IGM SER-MCNC: <5 GPL
CARDIOLIPIN IGM SER-MCNC: <5 MPL
CENTROMERE IGG SER-ACNC: <0.2 CD:130001892
DEPRECATED CARDIOLIPIN IGA SER: 7.1 APL
ENA JO1 AB SER IA-ACNC: <0.2 AL
ENA RNP AB SER IA-ACNC: <0.2 AL
ENA SCL70 IGG SER IA-ACNC: <0.2 AL
ENA SM AB SER IA-ACNC: <0.2 AL
G6PD SER-CCNC: 15 U/G HGB
THYROGLOB AB SERPL-ACNC: <20 IU/ML
THYROPEROXIDASE AB SERPL IA-ACNC: <10 IU/ML

## 2019-11-07 LAB
ACE BLD-CCNC: 47 U/L
HISTONE AB SER QL: 0.7 UNITS

## 2019-11-14 LAB
HLA-B27 RELATED AG QL: NORMAL
RNA POLYMERASE III IGG: <10 U

## 2019-11-22 ENCOUNTER — TRANSCRIPTION ENCOUNTER (OUTPATIENT)
Age: 49
End: 2019-11-22

## 2019-12-01 ENCOUNTER — TRANSCRIPTION ENCOUNTER (OUTPATIENT)
Age: 49
End: 2019-12-01

## 2019-12-02 ENCOUNTER — APPOINTMENT (OUTPATIENT)
Dept: RHEUMATOLOGY | Facility: CLINIC | Age: 49
End: 2019-12-02
Payer: COMMERCIAL

## 2019-12-02 VITALS
SYSTOLIC BLOOD PRESSURE: 102 MMHG | DIASTOLIC BLOOD PRESSURE: 72 MMHG | HEART RATE: 79 BPM | WEIGHT: 83 LBS | TEMPERATURE: 97.6 F | OXYGEN SATURATION: 98 % | BODY MASS INDEX: 15.68 KG/M2

## 2019-12-02 DIAGNOSIS — Z15.89 GENETIC SUSCEPTIBILITY TO OTHER DISEASE: ICD-10-CM

## 2019-12-02 DIAGNOSIS — I73.00 RAYNAUD'S SYNDROME W/OUT GANGRENE: ICD-10-CM

## 2019-12-02 DIAGNOSIS — H04.123 DRY EYE SYNDROME OF BILATERAL LACRIMAL GLANDS: ICD-10-CM

## 2019-12-02 DIAGNOSIS — R53.83 OTHER FATIGUE: ICD-10-CM

## 2019-12-02 DIAGNOSIS — M25.50 PAIN IN UNSPECIFIED JOINT: ICD-10-CM

## 2019-12-02 DIAGNOSIS — R21 RASH AND OTHER NONSPECIFIC SKIN ERUPTION: ICD-10-CM

## 2019-12-02 DIAGNOSIS — M17.0 BILATERAL PRIMARY OSTEOARTHRITIS OF KNEE: ICD-10-CM

## 2019-12-02 DIAGNOSIS — G89.29 LOW BACK PAIN: ICD-10-CM

## 2019-12-02 DIAGNOSIS — M54.5 LOW BACK PAIN: ICD-10-CM

## 2019-12-02 DIAGNOSIS — L65.9 NONSCARRING HAIR LOSS, UNSPECIFIED: ICD-10-CM

## 2019-12-02 DIAGNOSIS — R23.8 OTHER SKIN CHANGES: ICD-10-CM

## 2019-12-02 PROCEDURE — 99214 OFFICE O/P EST MOD 30 MIN: CPT

## 2019-12-02 NOTE — REVIEW OF SYSTEMS
[Chills] : chills [Fever] : fever [Feeling Poorly] : feeling poorly [Feeling Tired] : feeling tired [Dry Eyes] : dryness of the eyes [Hoarseness] : hoarseness [Heartburn] : heartburn [Arthralgias] : arthralgias [As Noted in HPI] : as noted in HPI [Anxiety] : anxiety [Negative] : Heme/Lymph

## 2019-12-02 NOTE — ASSESSMENT
[FreeTextEntry1] : NIKKI CAMPOS is a 49 year old woman with + chronic nonscarring alopecia, recurrent oral ulcers, dry eyes, mild Raynauds, and with 1 episode of erythematous rash in malar distribution, now improved and appearance more consistent with telangiectasia. + b/l knee pain, consistent with OA and deconditioning, no synovitis appreciated. Also with some nonspecific symptoms of intermittent fevers/chills, nausea and GERD, fatigue, and hoarse voice. Full rheum w/u negative aside from HLAB27, discussed with patient that this can be positive in normal  people -- does have low back pain but no limitation to ROM, no other sx consistent with seronegative arthritis/process, no genital ulcers to consider Behcets. New nonspecific LAD, in setting of recent pustular ulcers. \par \par - given LAD advised to have CXR ordered at last visit\par - check L spine/SI joints as well given low back pain to r/o inflammatory arthritis \par - if LAD still persisting in next 1-2 months will need bx\par - topical voltaren gel TID to knees as pt cannot have PO NSAIDs 2/2 PUD\par - opthalmology evaluation to ascertain level of dry eyes --she will send records over for review\par - f/u ENT for continued hoarseness\par - c/w Ca/Vit D supplementation\par - Will call to review imaging and decide f/u at that time

## 2019-12-02 NOTE — PHYSICAL EXAM
[General Appearance - Alert] : alert [General Appearance - In No Acute Distress] : in no acute distress [Sclera] : the sclera and conjunctiva were normal [PERRL With Normal Accommodation] : pupils were equal in size, round, and reactive to light [Extraocular Movements] : extraocular movements were intact [Outer Ear] : the ears and nose were normal in appearance [Nasal Cavity] : the nasal mucosa and septum were normal [Neck Appearance] : the appearance of the neck was normal [Thyroid Diffuse Enlargement] : the thyroid was not enlarged [] : no respiratory distress [Auscultation Breath Sounds / Voice Sounds] : lungs were clear to auscultation bilaterally [Heart Sounds] : normal S1 and S2 [Heart Rate And Rhythm] : heart rate was normal and rhythm regular [Heart Sounds Gallop] : no gallops [Murmurs] : no murmurs [Heart Sounds Pericardial Friction Rub] : no pericardial rub [Edema] : there was no peripheral edema [Bowel Sounds] : normal bowel sounds [Abdomen Soft] : soft [Abdomen Tenderness] : non-tender [Supraclavicular Lymph Nodes Enlarged Bilaterally] : supraclavicular [No CVA Tenderness] : no ~M costovertebral angle tenderness [No Spinal Tenderness] : no spinal tenderness [Nail Clubbing] : no clubbing  or cyanosis of the fingernails [Abnormal Walk] : normal gait [Motor Tone] : muscle strength and tone were normal [Musculoskeletal - Swelling] : no joint swelling seen [Skin Color & Pigmentation] : normal skin color and pigmentation [Skin Turgor] : normal skin turgor [Cranial Nerves] : cranial nerves 2-12 were intact [Motor Exam] : the motor exam was normal [No Focal Deficits] : no focal deficits [Oriented To Time, Place, And Person] : oriented to person, place, and time [Impaired Insight] : insight and judgment were intact [Affect] : the affect was normal [Cervical Lymph Nodes Enlarged Posterior Bilaterally] : posterior cervical [FreeTextEntry1] : + telangiectatic rash on cheeks b/l, sparing nose, + on chin as well. No other areas of rash appreciated. raynauds not appreciated on exam

## 2019-12-02 NOTE — HISTORY OF PRESENT ILLNESS
[FreeTextEntry1] : NIKKI CAMPOS is a 49 year old woman who presents for evaluation of possible autoimmune condition. Prompted by erythematous rash in malar distribution that acutely developed Oct 2019, has now improved but still with some erythema over cheeks. No sun exposure, new topicals, preceding illness prior to rash, felt raw like a sunburn, no rash elsewhere on body, no prior similar rash. Pt has had other nonspecific rashes, including blistering on palms in 2016, saw derm, no dx, neg DIF, never recurred. Saw derm since last visit, given topical meds, no dx. \par \par + chronic, nonscarring alopecia x 1 year at least, ?attributed to malnutrition from her anorexia and FH of female baldness, has never had painful lesions.\par \par + chronic, recurrent ulcers on buccal mucosa and lips, but none on top palate. Recent flare with pustular lip and bucal ulcers, s/p swab by ENT, results not back, now resolved. \par \par + Raynauds with cold weather, managed with gloves, no ulcerations or threatened digits. \par \par + ocular sicca, uses OTC eyes drops, optho has never given her rx drops, no abrasions. Also hx of numerous eye surgeries since childhood which may be contributing to dryness. Has upcoming optho appt.\par \par +Chronic b/l knee pain but no synovitis, prolonged AM stiffness, effusions or other symmetrical joint pain. Recent injury to R knee and fx of R toe, due for MRI knee in a few weeks. \par \par + chronic fatigue, sleeps well with Ambien, no mylagias or skin hypersensitivity, + anxiety\par \par No inflammatory low back pain, IBD, eye inflammation, psoriasis, tight skin, muscle weakness. + low back pain however, and no recent imaging. \par \par + nonspecific intermittent fevers, chills but no night sweats. + anorexia, weight has been stable per patient. + nausea, hx of GERD and PUD but no motility issues. + chronic hoarse voice, seen by ENT, ? vocal cord issue, due for strobe testing. Also noted with b/l cervical LAD on recent ENT exam. UTD with all age-appropriate malignancy screenings. Will ve getting repeat colon in 2020, prior one without inflammation. \par \par SLE ROS negative for oral sicca, salivary gland swelling, photosensitivity, SOB, chest pain, serositis, abd pain, dysuria, hematuria, joint AM stiffness/synovitis, hematologic abnormalities. No hx of thrombotic events.\par \par Labs - + [id number]\par negative full SLE/APLS, RF/CCP, ESR/CRP, UA, TFT, Faby-1, scleroderma\par DEXA 2019 with osteopenia, taking Ca/Vit D supplementation at home, no fragility fx.

## 2019-12-10 ENCOUNTER — FORM ENCOUNTER (OUTPATIENT)
Age: 49
End: 2019-12-10

## 2019-12-11 ENCOUNTER — APPOINTMENT (OUTPATIENT)
Dept: RADIOLOGY | Facility: CLINIC | Age: 49
End: 2019-12-11
Payer: COMMERCIAL

## 2019-12-11 ENCOUNTER — OUTPATIENT (OUTPATIENT)
Dept: OUTPATIENT SERVICES | Facility: HOSPITAL | Age: 49
LOS: 1 days | End: 2019-12-11
Payer: COMMERCIAL

## 2019-12-11 DIAGNOSIS — Z00.8 ENCOUNTER FOR OTHER GENERAL EXAMINATION: ICD-10-CM

## 2019-12-11 DIAGNOSIS — N80.9 ENDOMETRIOSIS, UNSPECIFIED: Chronic | ICD-10-CM

## 2019-12-11 PROCEDURE — 72100 X-RAY EXAM L-S SPINE 2/3 VWS: CPT

## 2019-12-11 PROCEDURE — 72100 X-RAY EXAM L-S SPINE 2/3 VWS: CPT | Mod: 26

## 2019-12-11 PROCEDURE — 72202 X-RAY EXAM SI JOINTS 3/> VWS: CPT | Mod: 26

## 2019-12-11 PROCEDURE — 71046 X-RAY EXAM CHEST 2 VIEWS: CPT | Mod: 26

## 2019-12-11 PROCEDURE — 71046 X-RAY EXAM CHEST 2 VIEWS: CPT

## 2019-12-11 PROCEDURE — 72202 X-RAY EXAM SI JOINTS 3/> VWS: CPT

## 2019-12-16 DIAGNOSIS — R59.0 LOCALIZED ENLARGED LYMPH NODES: ICD-10-CM

## 2019-12-18 ENCOUNTER — EMERGENCY (EMERGENCY)
Facility: HOSPITAL | Age: 49
LOS: 1 days | Discharge: HOME | End: 2019-12-18
Attending: EMERGENCY MEDICINE
Payer: COMMERCIAL

## 2019-12-18 VITALS
TEMPERATURE: 98 F | OXYGEN SATURATION: 98 % | SYSTOLIC BLOOD PRESSURE: 99 MMHG | RESPIRATION RATE: 18 BRPM | HEART RATE: 73 BPM | DIASTOLIC BLOOD PRESSURE: 69 MMHG

## 2019-12-18 VITALS
HEIGHT: 61 IN | TEMPERATURE: 98 F | HEART RATE: 70 BPM | WEIGHT: 85.1 LBS | RESPIRATION RATE: 18 BRPM | OXYGEN SATURATION: 99 %

## 2019-12-18 DIAGNOSIS — N80.9 ENDOMETRIOSIS, UNSPECIFIED: Chronic | ICD-10-CM

## 2019-12-18 LAB
ALBUMIN SERPL ELPH-MCNC: 4.4 G/DL — SIGNIFICANT CHANGE UP (ref 3.3–5)
ALP SERPL-CCNC: 40 U/L — SIGNIFICANT CHANGE UP (ref 40–120)
ALT FLD-CCNC: 21 U/L — SIGNIFICANT CHANGE UP (ref 10–45)
ANION GAP SERPL CALC-SCNC: 15 MMOL/L — SIGNIFICANT CHANGE UP (ref 5–17)
AST SERPL-CCNC: 23 U/L — SIGNIFICANT CHANGE UP (ref 10–40)
BASOPHILS # BLD AUTO: 0.02 K/UL — SIGNIFICANT CHANGE UP (ref 0–0.2)
BASOPHILS NFR BLD AUTO: 0.5 % — SIGNIFICANT CHANGE UP (ref 0–2)
BILIRUB SERPL-MCNC: 0.5 MG/DL — SIGNIFICANT CHANGE UP (ref 0.2–1.2)
BUN SERPL-MCNC: 7 MG/DL — SIGNIFICANT CHANGE UP (ref 7–23)
CALCIUM SERPL-MCNC: 9.2 MG/DL — SIGNIFICANT CHANGE UP (ref 8.4–10.5)
CHLORIDE SERPL-SCNC: 98 MMOL/L — SIGNIFICANT CHANGE UP (ref 96–108)
CO2 SERPL-SCNC: 23 MMOL/L — SIGNIFICANT CHANGE UP (ref 22–31)
CREAT SERPL-MCNC: 0.59 MG/DL — SIGNIFICANT CHANGE UP (ref 0.5–1.3)
EOSINOPHIL # BLD AUTO: 0.04 K/UL — SIGNIFICANT CHANGE UP (ref 0–0.5)
EOSINOPHIL NFR BLD AUTO: 1 % — SIGNIFICANT CHANGE UP (ref 0–6)
GLUCOSE SERPL-MCNC: 77 MG/DL — SIGNIFICANT CHANGE UP (ref 70–99)
HCT VFR BLD CALC: 36.6 % — SIGNIFICANT CHANGE UP (ref 34.5–45)
HGB BLD-MCNC: 12.8 G/DL — SIGNIFICANT CHANGE UP (ref 11.5–15.5)
IMM GRANULOCYTES NFR BLD AUTO: 0.2 % — SIGNIFICANT CHANGE UP (ref 0–1.5)
LYMPHOCYTES # BLD AUTO: 1.48 K/UL — SIGNIFICANT CHANGE UP (ref 1–3.3)
LYMPHOCYTES # BLD AUTO: 36.9 % — SIGNIFICANT CHANGE UP (ref 13–44)
MCHC RBC-ENTMCNC: 35 GM/DL — SIGNIFICANT CHANGE UP (ref 32–36)
MCHC RBC-ENTMCNC: 35.5 PG — HIGH (ref 27–34)
MCV RBC AUTO: 101.4 FL — HIGH (ref 80–100)
MONOCYTES # BLD AUTO: 0.38 K/UL — SIGNIFICANT CHANGE UP (ref 0–0.9)
MONOCYTES NFR BLD AUTO: 9.5 % — SIGNIFICANT CHANGE UP (ref 2–14)
NEUTROPHILS # BLD AUTO: 2.08 K/UL — SIGNIFICANT CHANGE UP (ref 1.8–7.4)
NEUTROPHILS NFR BLD AUTO: 51.9 % — SIGNIFICANT CHANGE UP (ref 43–77)
NRBC # BLD: 0 /100 WBCS — SIGNIFICANT CHANGE UP (ref 0–0)
PLATELET # BLD AUTO: 180 K/UL — SIGNIFICANT CHANGE UP (ref 150–400)
POTASSIUM SERPL-MCNC: 3.6 MMOL/L — SIGNIFICANT CHANGE UP (ref 3.5–5.3)
POTASSIUM SERPL-SCNC: 3.6 MMOL/L — SIGNIFICANT CHANGE UP (ref 3.5–5.3)
PROT SERPL-MCNC: 6.9 G/DL — SIGNIFICANT CHANGE UP (ref 6–8.3)
RBC # BLD: 3.61 M/UL — LOW (ref 3.8–5.2)
RBC # FLD: 11.2 % — SIGNIFICANT CHANGE UP (ref 10.3–14.5)
SODIUM SERPL-SCNC: 136 MMOL/L — SIGNIFICANT CHANGE UP (ref 135–145)
WBC # BLD: 4.01 K/UL — SIGNIFICANT CHANGE UP (ref 3.8–10.5)
WBC # FLD AUTO: 4.01 K/UL — SIGNIFICANT CHANGE UP (ref 3.8–10.5)

## 2019-12-18 PROCEDURE — 70450 CT HEAD/BRAIN W/O DYE: CPT | Mod: 26

## 2019-12-18 PROCEDURE — 85027 COMPLETE CBC AUTOMATED: CPT

## 2019-12-18 PROCEDURE — 99284 EMERGENCY DEPT VISIT MOD MDM: CPT

## 2019-12-18 PROCEDURE — 70450 CT HEAD/BRAIN W/O DYE: CPT

## 2019-12-18 PROCEDURE — 80053 COMPREHEN METABOLIC PANEL: CPT

## 2019-12-18 PROCEDURE — 96375 TX/PRO/DX INJ NEW DRUG ADDON: CPT

## 2019-12-18 PROCEDURE — 96374 THER/PROPH/DIAG INJ IV PUSH: CPT

## 2019-12-18 PROCEDURE — 85652 RBC SED RATE AUTOMATED: CPT

## 2019-12-18 PROCEDURE — 99284 EMERGENCY DEPT VISIT MOD MDM: CPT | Mod: 25

## 2019-12-18 RX ORDER — METOCLOPRAMIDE HCL 10 MG
10 TABLET ORAL ONCE
Refills: 0 | Status: COMPLETED | OUTPATIENT
Start: 2019-12-18 | End: 2019-12-18

## 2019-12-18 RX ORDER — ACETAMINOPHEN 500 MG
650 TABLET ORAL ONCE
Refills: 0 | Status: COMPLETED | OUTPATIENT
Start: 2019-12-18 | End: 2019-12-18

## 2019-12-18 RX ORDER — KETOROLAC TROMETHAMINE 30 MG/ML
15 SYRINGE (ML) INJECTION ONCE
Refills: 0 | Status: DISCONTINUED | OUTPATIENT
Start: 2019-12-18 | End: 2019-12-18

## 2019-12-18 RX ADMIN — Medication 650 MILLIGRAM(S): at 16:55

## 2019-12-18 RX ADMIN — Medication 15 MILLIGRAM(S): at 16:55

## 2019-12-18 RX ADMIN — Medication 10 MILLIGRAM(S): at 16:55

## 2019-12-18 NOTE — ED PROVIDER NOTE - NS ED ROS FT
GENERAL: no fever, no chills  EYES: no irritation, no discharge, no redness, no pain  HEENT: no trouble swallowing or speaking, no throat pain, +ear pain  CARDIAC: no chest pain, no palpitations   PULMONARY: no cough, no shortness of breath, no wheezing  GI: no abdominal pain, no nausea, no vomiting, no diarrhea, no constipation  : no changes in urination  SKIN: +rashes  NEURO: +headache, no numbness, no weakness  MSK: no joint pain, no muscle pain, no back pain, no calf pain    -Nestor Gresham, PGY-1

## 2019-12-18 NOTE — ED ADULT NURSE NOTE - OBJECTIVE STATEMENT
49y female with hx of endometriosis, lazy eye with corrective sx, and anxiety presents to the er c/o head pressure. pt is alert and oriented x 4 and speaking coherently. pt states x several months she has had progressively worsening vision with intermittent headaches and ear pain which she has followed with w urgent care and her pcp several times. she went to the dr and was told to follow up with neuro optho and was told to come to the er today after her vision suddenly worsened 3 days ago. pt states she has b/l ear pressure, with worse pain on the right side. pt states the pain is severe. pt is holding right ear during exam and has  speak for her. pt moving all extremities, gait steady. pt appears uncomfortable. pt sent in from neuro optho today for neuro eval. will reassess.

## 2019-12-18 NOTE — ED PROVIDER NOTE - PATIENT PORTAL LINK FT
You can access the FollowMyHealth Patient Portal offered by [facility] by registering at the following website: [email or web address]. By joining [employer] [employer] FollowMyHealth portal, you will also be able to view your health information using other applications (apps) compatible with our system.

## 2019-12-18 NOTE — ED PROVIDER NOTE - PHYSICAL EXAMINATION
GENERAL: A&Ox3, appears uncomfortable 2/2 pain,  at bedside  HEENT: NCAT, EOMI, oral mucosa moist, normal conjunctiva, no ulcers or lesions noted, anterior cervical lymphadenopathy present L>R, no ear canal redness, normal TM b/l w/o bulging or erythema, no ear proptosis, no mastoid tenderness, no posterior auricular erythema or lymph node enlargement, neck supple w/ FROM  RESP: CTAB, no respiratory distress, no wheezes/rhonchi/rales, speaking in full sentences  CV: RRR, no murmurs/rubs/gallops  ABDOMEN: soft, non-tender, non-distended, no guarding  MSK: no visible deformities  NEURO: no focal sensory or motor deficits, normal CN exam  SKIN: warm, normal color, well perfused, sporadic papular rash on face  PSYCH: normal affect    -Nestor Gresham, PGY-1 GENERAL: A&Ox3, appears uncomfortable 2/2 pain,  at bedside  HEENT: NCAT, EOMI, oral mucosa moist, normal conjunctiva, no ulcers or lesions noted, anterior cervical lymphadenopathy present L>R, no ear canal redness, normal TM b/l w/o bulging or erythema, no ear proptosis, no mastoid tenderness, no posterior auricular erythema or lymph node enlargement, neck supple w/ FROM  RESP: CTAB, no respiratory distress, no wheezes/rhonchi/rales, speaking in full sentences  CV: RRR, no murmurs/rubs/gallops  ABDOMEN: soft, non-tender, non-distended, no guarding  MSK: no visible deformities  NEURO: no focal sensory or motor deficits, normal CN exam  SKIN: warm, normal color, well perfused, sporadic papular rash on face  PSYCH: normal affect    -Nestor Gresham, PGY-1       Attending note. Patient is alert and in moderate distress. Examination of the scalp was unremarkable. Patient has no tenderness or erythema of the mastoids. External ear, ear canals and tympanic membranes are normal. There is no swelling, erythema or discharge. Oropharynx is normal. Pupils are 5 mm equal and reactive. There is no nystagmus. Patient has a disconjugate gaze. There is nontender cervical cervical adenopathy on the right. Lungs are clear and equal bilaterally. Heart is regular rate and rhythm without murmur. Abdomen is soft, flat nontender. There is no leg edema. There is no facial asymmetry. Motor strength is 5/5 equal and symmetrical. DTRs are +2/4 and symmetrical. Finger to nose is intact. There is no pronator drift. Speech is clear and fluent.

## 2019-12-18 NOTE — CONSULT NOTE ADULT - ASSESSMENT
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     Plan:  - can follow up outpatient with Neurology at [address] [place] [address], [place] [phone]    Discussed w/ Dr. Crenshaw

## 2019-12-18 NOTE — ED PROVIDER NOTE - NSFOLLOWUPCLINICS_GEN_ALL_ED_FT
[facility]  Neurology Auto[facility] Encephalitis  [address] [place] [address]  Phone: [phone]  Fax: [phone]  Follow Up Time:
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
## 2019-12-18 NOTE — ED PROVIDER NOTE - NOTES
16:45 neurology 2nd page. D/w Dr. Lux, states her discussion with Dr. Barry was regarding mastoiditis. Would like to be consulted after CT results.

## 2019-12-18 NOTE — ED PROVIDER NOTE - NSFOLLOWUPINSTRUCTIONS_ED_ALL_ED_FT
- Please follow-up with Neurology regarding your recent ED visit to continue work-up for your visual disturbance and ear pain. You may call Neurology at the numbers provided to you: [phone] or [phone].  - Please return to the ED if you develop any fevers, chills, visual loss, worsening pain, numbness, weakness, slurred speech, or mental status changes.

## 2019-12-18 NOTE — ED PROVIDER NOTE - CLINICAL SUMMARY MEDICAL DECISION MAKING FREE TEXT BOX
DH: 49 year old female p/w right ear pain and headache x3 days, referred to ED by neuro ophthalmologist to r/o mastoiditis. No tinnitus, no fever, no pain w/ EOM. No s/s of mastoiditis on exam, normal ear exam. Doubt mastoiditis, doubt otitis, doubt trigeminal neuralgia, consider migraine sequelae. Plan for labs, CT head, pain management, neuro cs, reassess. Referring physician requesting thorough workup, can consider admission for further testing. DH: 49 year old female p/w right ear pain and headache x3 days, referred to ED by neuro ophthalmologist to r/o mastoiditis. No tinnitus, no fever, no pain w/ EOM. No s/s of mastoiditis on exam, normal ear exam. Doubt mastoiditis, doubt otitis, doubt trigeminal neuralgia, consider migraine sequelae. Plan for labs, CT head, pain management, neuro cs, reassess. Referring physician requesting thorough workup, can consider admission for further testing.      Attending note. Patient with various visual disturbances over the last several months now bilateral ear pain with the right greater than left. Patient was evaluated by neuro-ophthalmology today and sent to the emergency department for further evaluation by neurology and ENT. CT of the head, labs, sedimentation rate and reassess. Would strongly consider admission for further evaluation of the symptoms.

## 2019-12-18 NOTE — ED PROVIDER NOTE - OBJECTIVE STATEMENT
49 year old female PMH anxiety, depression, p/w  for right ear pain and headache. Patient states gradual onset 3 days ago. Saw ophthalmologist w/ reportedly normal exam, saw neuro ophthalmologist today and was referred to ED for concern of mastoiditis. Pain is surrounding right ear and inside right ear, feels like a "pressure". A/w global "throbbing" headache that has been persistent. Pain mildly improved w/ holding right ear. Has also been having facial rash and oral ulcers since Sept, seeing rheumatologist and ENT. Denies fever, chills, tinnitus, jaw pain, difficulty swallowing, cp, sob, weakness, or numbness/tingling. 49 year old female PMH anxiety, depression, p/w  for right ear pain and headache. Patient states gradual onset 3 days ago. Saw ophthalmologist w/ reportedly normal exam, saw neuro ophthalmologist today and was referred to ED for concern of mastoiditis. Pain is surrounding right ear and inside right ear, feels like a "pressure". A/w global "throbbing" headache that has been persistent. Pain mildly improved w/ holding right ear. Has also been having facial rash and oral ulcers since Sept, seeing rheumatologist and ENT. Denies fever, chills, tinnitus, jaw pain, difficulty swallowing, cp, sob, weakness, or numbness/tingling.      Attending note. Patient was seen in the fast track room #2. Agree with the above. Patient was sent to the emergency department by our ophthalmology for further evaluation. Patient has been having various visual disturbances since September. She began to have bilateral ear pain which was severe 34 days ago. Patient was evaluated by ophthalmology who referred the patient to Neuro-ophthalmology [facility] today. 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 Denies any recent here or throat infection. Patient was evaluated by ENT as well as rheumatology over several months. She denies any weight loss. 49 year old female PMH anxiety, depression, p/w  for right ear pain and headache. Patient states gradual onset 3 days ago. Saw ophthalmologist w/ reportedly normal exam, saw neuro ophthalmologist today and was referred to ED for concern of mastoiditis. Pain is surrounding right ear and inside right ear, feels like a "pressure". A/w global "throbbing" headache that has been persistent. Pain mildly improved w/ holding right ear. Not worse w/ chewing or neck movement. Has also been having facial rash and oral ulcers since Sept, seeing rheumatologist and ENT. Denies fever, chills, tinnitus, jaw pain, difficulty swallowing, cp, sob, weakness, or numbness/tingling.      Attending note. Patient was seen in the fast track room #2. Agree with the above. Patient was sent to the emergency department by our ophthalmology for further evaluation. Patient has been having various visual disturbances since September. She began to have bilateral ear pain which was severe 34 days ago. Patient was evaluated by ophthalmology who referred the patient to Neuro-ophthalmology [facility] today. 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 Denies any recent here or throat infection. Patient was evaluated by ENT as well as rheumatology over several months. She denies any weight loss.

## 2019-12-18 NOTE — CONSULT NOTE ADULT - SUBJECTIVE AND OBJECTIVE BOX
HPI:          MEDICATIONS  (STANDING):    MEDICATIONS  (PRN):    PAST MEDICAL & SURGICAL HISTORY:  Menopause praecox  Endometriosis determined by laparoscopy  Anxiety  Endometriosis determined by laparoscopy: over 15 years ago    FAMILY HISTORY:    Allergies    predniSONE (Unknown)    Intolerances        SHx - No smoking, No ETOH, No drug abuse    Review of Systems:  A 10 system ROS was performed and is negative except for those mentioned in HPI      Vital Signs Last 24 Hrs  T(C): 36.8 (18 Dec 2019 19:25), Max: 36.8 (18 Dec 2019 19:25)  T(F): 98.3 (18 Dec 2019 19:25), Max: 98.3 (18 Dec 2019 19:25)  HR: 73 (18 Dec 2019 19:25) (70 - 73)  BP: 99/69 (18 Dec 2019 19:25) (99/69 - 99/69)  BP(mean): --  RR: 18 (18 Dec 2019 19:25) (18 - 18)  SpO2: 98% (18 Dec 2019 19:25) (98% - 99%)  General:  Constitutional: Obese Female, appears stated age, in no apparent distress including pain  Head: Normocephalic & atraumatic.  ENT: Patent ear canals, intact TM, mucus membranes moist & pink, neck supple, no lymphadenopathy.   Respiratory: Patent airway. All lung fields are clear to auscultation bilaterally.  Extremities: No cyanosis, clubbing, or edema.  Skin: No rashes, bruising, or discoloration.    Cardiovascular (>2): RRR no murmurs. Carotid pulsations symmetric, no bruits. Normal capillary beds refill, 1-2 seconds or less.     Neurological (>12):  MS: Awake, alert, oriented to person, place, situation, time. Normal affect. Follows all commands.    Language: Speech is clear, fluent with good repetition & comprehension (able to name objects___)    CNs: PERRLA (R = 3mm, L = 3mm). VFF. EOMI no nystagmus, no diplopia. V1-3 intact to LT/pinprick, well developed masseter muscles b/l. No facial asymmetry b/l, full eye closure strength b/l. Hearing grossly normal (rubbing fingers) b/l. Symmetric palate elevation in midline. Gag reflex deferred. Head turning & shoulder shrug intact b/l. Tongue midline, normal movements, no atrophy.    Fundoscopic: pale w/ sharp discs margins No vascular changes.      Motor: Normal muscle bulk & tone. No noticeable tremor or seizure. No pronator drift.              Deltoid	Biceps	Triceps	Wrist	Finger ABd	   R	5	5	5	5	5		5 	  L	5	5	5	5	5		5    	H-Flex	H-Ext	H-ABd	H-ADd	K-Flex	K-Ext	D-Flex	P-Flex  R	5	5	5	5	5	5	5	5 	   L	5	5	5	5	5	5	5	5	     Sensation: Intact to LT/PP/Temp/Vibration/Position b/l throughout.     Cortical: Extinction on DSS (neglect): none    Reflexes:              Biceps(C5)       BR(C6)     Triceps(C7)               Patellar(L4)    Achilles(S1)    Plantar Resp  R	2	          2	             2		        2		    2		Down   L	2	          2	             2		        2		    2		Down     Coordination: intact rapid-alt movements. No dysmetria to FTN/HTS    Gait: Normal Romberg. No postural instability. Normal stance and tandem gait.         12-18    136  |  98  |  7   ----------------------------<  77  3.6   |  23  |  0.59    Ca    9.2      18 Dec 2019 16:26    TPro  6.9  /  Alb  4.4  /  TBili  0.5  /  DBili  x   /  AST  23  /  ALT  21  /  AlkPhos  40  12-18 12-18    136  |  98  |  7   ----------------------------<  77  3.6   |  23  |  0.59    Ca    9.2      18 Dec 2019 16:26    TPro  6.9  /  Alb  4.4  /  TBili  0.5  /  DBili  x   /  AST  23  /  ALT  21  /  AlkPhos  40  12-18                          12.8   4.01  )-----------( 180      ( 18 Dec 2019 16:26 )             36.6       Radiology HPI:  Patient is a 49Y F with PMH anxiety (on Valium, Lexapro), depression, anorexia, strabismus s/p several eye muscle correction surgeries who was sent in by Neuro-Optho to rule out mastoiditis. Patient has complex medical history and is being worked up outpatient by Rheum for ?inflammatory arthritis. She had a facial rash (now considered as telangiectasias), oral ulcers, joint pain, alopecia. Her Rheum w/u showed positive HLA-B27 however rest of serology was negative. She developed a hoarse voice 3 months ago and got scoped by ENT which demonstrated mucous but no pathology. She also has known cervical adenopathy of which biopsy was recommended but she has yet to pursue.  For the past few weeks she has been complaining of blurry vision for which she saw an Opthalmologist who then referred her to Neuro-Optho. No remarkable findings on outpatient exam except for CN 6 palsy (unclear if new or old). She also endorses a throbbing bilateral inner ear pain for which she has been seen 3 times at urgent care to rule out ear infection but no infection was ever found. Additionally, she is complaining of band like pressure around her head that has been present for the past 5 days. Of note, CT Head performed in ED negative for acute intracranial pathology.   Patient asked ED for morphine drip for her headache, has hx of opiate abuse and was previously in rehab for opiate abuse and suicidality.         MEDICATIONS  (STANDING):    MEDICATIONS  (PRN):    PAST MEDICAL & SURGICAL HISTORY:  Menopause praecox  Endometriosis determined by laparoscopy  Anxiety  Endometriosis determined by laparoscopy: over 15 years ago    FAMILY HISTORY:    Allergies    predniSONE (Unknown)    Intolerances        SHx - No smoking, No ETOH, No drug abuse    Review of Systems:  A 10 system ROS was performed and is negative except for those mentioned in HPI      Vital Signs Last 24 Hrs  T(C): 36.8 (18 Dec 2019 19:25), Max: 36.8 (18 Dec 2019 19:25)  T(F): 98.3 (18 Dec 2019 19:25), Max: 98.3 (18 Dec 2019 19:25)  HR: 73 (18 Dec 2019 19:25) (70 - 73)  BP: 99/69 (18 Dec 2019 19:25) (99/69 - 99/69)  BP(mean): --  RR: 18 (18 Dec 2019 19:25) (18 - 18)  SpO2: 98% (18 Dec 2019 19:25) (98% - 99%)  General:  Constitutional: Thin, cachetic Female, appears stated age, in no apparent distress including pain  Head: Normocephalic & atraumatic.  ENT: Patent ear canals, intact TM, mucus membranes moist & pink, neck supple, + bilateral cervical lymphadenopathy    Cardiovascular (>2): RRR no murmurs. Carotid pulsations symmetric, no bruits. Normal capillary beds refill, 1-2 seconds or less.     Neurological (>12):  MS: Awake, alert, oriented to person, place, situation, time. Normal affect. Follows all commands.    Language: Speech is clear, fluent with good repetition & comprehension (able to name objects___)    CNs: PERRLA (R = 4mm, L = 4mm). VFF. R CN 6 palsy no nystagmus, no diplopia. V1-3 intact to LT/pinprick, well developed masseter muscles b/l. No facial asymmetry b/l, full eye closure strength b/l. Hearing grossly normal (rubbing fingers) b/l. Symmetric palate elevation in midline. Gag reflex deferred. Head turning & shoulder shrug intact b/l. Tongue midline, normal movements, no atrophy.      Motor: Normal muscle bulk & tone. No noticeable tremor or seizure. No pronator drift.              Deltoid	Biceps	Triceps	Wrist	Finger ABd	   R	5	5	5	5	5		5 	  L	5	5	5	5	5		5    	H-Flex	H-Ext	H-ABd	H-ADd	K-Flex	K-Ext	D-Flex	P-Flex  R	5	5	5	5	5	5	5	5 	   L	5	5	5	5	5	5	5	5	     Sensation: Intact to LT/PP/Temp/Vibration/Position b/l throughout.     Cortical: Extinction on DSS (neglect): none    Reflexes:              Biceps(C5)       BR(C6)     Triceps(C7)               Patellar(L4)    Achilles(S1)    Plantar Resp  R	2	          2	             2		        2		    2		Down   L	2	          2	             2		        2		    2		Down     Coordination: intact rapid-alt movements. No dysmetria to FTN/HTS    Gait: Normal Romberg. No postural instability. Normal stance and tandem gait.         12-18    136  |  98  |  7   ----------------------------<  77  3.6   |  23  |  0.59    Ca    9.2      18 Dec 2019 16:26    TPro  6.9  /  Alb  4.4  /  TBili  0.5  /  DBili  x   /  AST  23  /  ALT  21  /  AlkPhos  40  12-18    12-18    136  |  98  |  7   ----------------------------<  77  3.6   |  23  |  0.59    Ca    9.2      18 Dec 2019 16:26    TPro  6.9  /  Alb  4.4  /  TBili  0.5  /  DBili  x   /  AST  23  /  ALT  21  /  AlkPhos  40  12-18                          12.8   4.01  )-----------( 180      ( 18 Dec 2019 16:26 )             36.6       Radiology  < from: CT Head No Cont (12.18.19 @ 18:25) >  EXAM:  CT BRAIN                            PROCEDURE DATE:  12/18/2019            INTERPRETATION:  HISTORY: Headache, right ear pain.    Technique: CT of the head was performed without intravenous contrast.    Multiple contiguous axial images were acquired from the skullbase to the vertex without the administration of intravenous contrast.  Coronal and sagittal reformations were made.    COMPARISON: None    FINDINGS:  No acute intracranial hemorrhage, mass effect or midline shift.    The ventricles and sulci are within normal limits for the patient's age without evidence of hydrocephalus.     The calvarium is intact. The visualized intraorbital compartments, paranasal sinuses and mastoid complexes are free of acute disease.    < end of copied text >

## 2019-12-30 ENCOUNTER — OUTPATIENT (OUTPATIENT)
Dept: OUTPATIENT SERVICES | Facility: HOSPITAL | Age: 49
LOS: 1 days | End: 2019-12-30
Payer: COMMERCIAL

## 2019-12-30 ENCOUNTER — APPOINTMENT (OUTPATIENT)
Dept: MRI IMAGING | Facility: CLINIC | Age: 49
End: 2019-12-30
Payer: COMMERCIAL

## 2019-12-30 DIAGNOSIS — Z00.8 ENCOUNTER FOR OTHER GENERAL EXAMINATION: ICD-10-CM

## 2019-12-30 DIAGNOSIS — N80.9 ENDOMETRIOSIS, UNSPECIFIED: Chronic | ICD-10-CM

## 2019-12-30 PROCEDURE — 70553 MRI BRAIN STEM W/O & W/DYE: CPT | Mod: 26

## 2019-12-30 PROCEDURE — A9585: CPT

## 2019-12-30 PROCEDURE — 70553 MRI BRAIN STEM W/O & W/DYE: CPT

## 2020-01-02 ENCOUNTER — APPOINTMENT (OUTPATIENT)
Dept: NEUROLOGY | Facility: CLINIC | Age: 50
End: 2020-01-02

## 2020-01-07 ENCOUNTER — RX CHANGE (OUTPATIENT)
Age: 50
End: 2020-01-07

## 2020-01-15 ENCOUNTER — APPOINTMENT (OUTPATIENT)
Dept: SURGERY | Facility: CLINIC | Age: 50
End: 2020-01-15
Payer: COMMERCIAL

## 2020-01-15 VITALS
BODY MASS INDEX: 16.24 KG/M2 | TEMPERATURE: 97.3 F | SYSTOLIC BLOOD PRESSURE: 116 MMHG | OXYGEN SATURATION: 99 % | HEART RATE: 68 BPM | DIASTOLIC BLOOD PRESSURE: 82 MMHG | WEIGHT: 86 LBS | HEIGHT: 61 IN

## 2020-01-15 DIAGNOSIS — R59.0 LOCALIZED ENLARGED LYMPH NODES: ICD-10-CM

## 2020-01-15 PROCEDURE — 99202 OFFICE O/P NEW SF 15 MIN: CPT

## 2020-01-15 NOTE — PHYSICAL EXAM
[JVD] : no jugular venous distention  [de-identified] : At the time of examination, the patient was noted to have mildly enlarged lymphadenopathy in the anterior cervical chain. Each side was noted to be equal in the size. There noted to be mobile and nontender. At the time of this examination.\par No other acute findings were appreciated. [de-identified] : NAD

## 2020-01-15 NOTE — REASON FOR VISIT
[Consultation] : a consultation visit [Spouse] : spouse [FreeTextEntry1] : 49-year-old female, referred here for consultation to get a cervical lymph node biopsy.

## 2020-01-15 NOTE — PLAN
[FreeTextEntry1] : A discussion of the patient's condition and he surgical options were explained. The patient opts for cervical lymph node biopsy for evaluation to look for any immunological or hematological causes of her symptoms. She will be scheduled within the next coming weeks for the biopsy at [facility].

## 2020-01-15 NOTE — HISTORY OF PRESENT ILLNESS
[de-identified] : 49-year-old female with a vague, he, history rashes, blistering, lymphadenopathy, with auditory issues and complains an extensive workup for the cause of all these issues without resolution was referred here for a lymph node biopsy to look for any autoimmune type etiologies causing all of the above.

## 2020-02-12 ENCOUNTER — APPOINTMENT (OUTPATIENT)
Dept: SURGERY | Facility: HOSPITAL | Age: 50
End: 2020-02-12

## 2020-02-25 ENCOUNTER — OUTPATIENT (OUTPATIENT)
Dept: OUTPATIENT SERVICES | Facility: HOSPITAL | Age: 50
LOS: 1 days | End: 2020-02-25
Payer: COMMERCIAL

## 2020-02-25 VITALS
DIASTOLIC BLOOD PRESSURE: 80 MMHG | RESPIRATION RATE: 20 BRPM | SYSTOLIC BLOOD PRESSURE: 106 MMHG | HEART RATE: 72 BPM | HEIGHT: 61 IN | OXYGEN SATURATION: 100 % | TEMPERATURE: 98 F | WEIGHT: 80.03 LBS

## 2020-02-25 DIAGNOSIS — Z01.818 ENCOUNTER FOR OTHER PREPROCEDURAL EXAMINATION: ICD-10-CM

## 2020-02-25 DIAGNOSIS — Z90.49 ACQUIRED ABSENCE OF OTHER SPECIFIED PARTS OF DIGESTIVE TRACT: Chronic | ICD-10-CM

## 2020-02-25 DIAGNOSIS — N80.9 ENDOMETRIOSIS, UNSPECIFIED: Chronic | ICD-10-CM

## 2020-02-25 DIAGNOSIS — R59.0 LOCALIZED ENLARGED LYMPH NODES: ICD-10-CM

## 2020-02-25 DIAGNOSIS — Z98.890 OTHER SPECIFIED POSTPROCEDURAL STATES: Chronic | ICD-10-CM

## 2020-02-25 LAB
ANION GAP SERPL CALC-SCNC: 5 MMOL/L — SIGNIFICANT CHANGE UP (ref 5–17)
APTT BLD: 28.4 SEC — SIGNIFICANT CHANGE UP (ref 27.5–36.3)
BASOPHILS # BLD AUTO: 0.03 K/UL — SIGNIFICANT CHANGE UP (ref 0–0.2)
BASOPHILS NFR BLD AUTO: 0.6 % — SIGNIFICANT CHANGE UP (ref 0–2)
BUN SERPL-MCNC: 7 MG/DL — SIGNIFICANT CHANGE UP (ref 7–23)
CALCIUM SERPL-MCNC: 9 MG/DL — SIGNIFICANT CHANGE UP (ref 8.5–10.1)
CHLORIDE SERPL-SCNC: 103 MMOL/L — SIGNIFICANT CHANGE UP (ref 96–108)
CO2 SERPL-SCNC: 28 MMOL/L — SIGNIFICANT CHANGE UP (ref 22–31)
CREAT SERPL-MCNC: 0.71 MG/DL — SIGNIFICANT CHANGE UP (ref 0.5–1.3)
EOSINOPHIL # BLD AUTO: 0.07 K/UL — SIGNIFICANT CHANGE UP (ref 0–0.5)
EOSINOPHIL NFR BLD AUTO: 1.4 % — SIGNIFICANT CHANGE UP (ref 0–6)
GLUCOSE SERPL-MCNC: 81 MG/DL — SIGNIFICANT CHANGE UP (ref 70–99)
HCT VFR BLD CALC: 39.9 % — SIGNIFICANT CHANGE UP (ref 34.5–45)
HGB BLD-MCNC: 14.4 G/DL — SIGNIFICANT CHANGE UP (ref 11.5–15.5)
IMM GRANULOCYTES NFR BLD AUTO: 0.2 % — SIGNIFICANT CHANGE UP (ref 0–1.5)
INR BLD: 1.07 RATIO — SIGNIFICANT CHANGE UP (ref 0.88–1.16)
LYMPHOCYTES # BLD AUTO: 1.29 K/UL — SIGNIFICANT CHANGE UP (ref 1–3.3)
LYMPHOCYTES # BLD AUTO: 26.1 % — SIGNIFICANT CHANGE UP (ref 13–44)
MCHC RBC-ENTMCNC: 34.5 PG — HIGH (ref 27–34)
MCHC RBC-ENTMCNC: 36.1 GM/DL — HIGH (ref 32–36)
MCV RBC AUTO: 95.7 FL — SIGNIFICANT CHANGE UP (ref 80–100)
MONOCYTES # BLD AUTO: 0.31 K/UL — SIGNIFICANT CHANGE UP (ref 0–0.9)
MONOCYTES NFR BLD AUTO: 6.3 % — SIGNIFICANT CHANGE UP (ref 2–14)
NEUTROPHILS # BLD AUTO: 3.23 K/UL — SIGNIFICANT CHANGE UP (ref 1.8–7.4)
NEUTROPHILS NFR BLD AUTO: 65.4 % — SIGNIFICANT CHANGE UP (ref 43–77)
PLATELET # BLD AUTO: 249 K/UL — SIGNIFICANT CHANGE UP (ref 150–400)
POTASSIUM SERPL-MCNC: 4.4 MMOL/L — SIGNIFICANT CHANGE UP (ref 3.5–5.3)
POTASSIUM SERPL-SCNC: 4.4 MMOL/L — SIGNIFICANT CHANGE UP (ref 3.5–5.3)
PROTHROM AB SERPL-ACNC: 11.9 SEC — SIGNIFICANT CHANGE UP (ref 10–12.9)
RBC # BLD: 4.17 M/UL — SIGNIFICANT CHANGE UP (ref 3.8–5.2)
RBC # FLD: 12.1 % — SIGNIFICANT CHANGE UP (ref 10.3–14.5)
SODIUM SERPL-SCNC: 136 MMOL/L — SIGNIFICANT CHANGE UP (ref 135–145)
WBC # BLD: 4.94 K/UL — SIGNIFICANT CHANGE UP (ref 3.8–10.5)
WBC # FLD AUTO: 4.94 K/UL — SIGNIFICANT CHANGE UP (ref 3.8–10.5)

## 2020-02-25 PROCEDURE — G0463: CPT | Mod: 25

## 2020-02-25 PROCEDURE — 85610 PROTHROMBIN TIME: CPT

## 2020-02-25 PROCEDURE — 85025 COMPLETE CBC W/AUTO DIFF WBC: CPT

## 2020-02-25 PROCEDURE — 86850 RBC ANTIBODY SCREEN: CPT

## 2020-02-25 PROCEDURE — 86901 BLOOD TYPING SEROLOGIC RH(D): CPT

## 2020-02-25 PROCEDURE — 86900 BLOOD TYPING SEROLOGIC ABO: CPT

## 2020-02-25 PROCEDURE — 80048 BASIC METABOLIC PNL TOTAL CA: CPT

## 2020-02-25 PROCEDURE — 85730 THROMBOPLASTIN TIME PARTIAL: CPT

## 2020-02-25 PROCEDURE — 36415 COLL VENOUS BLD VENIPUNCTURE: CPT

## 2020-02-25 RX ORDER — ESCITALOPRAM OXALATE 10 MG/1
25 TABLET, FILM COATED ORAL
Qty: 0 | Refills: 0 | DISCHARGE

## 2020-02-25 NOTE — H&P PST ADULT - ASSESSMENT
50 y/o female with complain of rashes, lymphadenopathy. Scheduled for cervical lymph node biopsy.   Plan  1. Stop all NSAIDS, herbal supplements and vitamins for 7 days.  2. NPO as per ASU instructions.  3. Take the following medications ( Lexapro, Valium ) with small sips of water on the morning of your procedure/surgery.  4. Use EZ sponges as directed  5. Labs as per surgeon

## 2020-02-25 NOTE — H&P PST ADULT - NSICDXPASTSURGICALHX_GEN_ALL_CORE_FT
PAST SURGICAL HISTORY:  Endometriosis determined by laparoscopy over 15 years ago    History of strabismus surgery 2x    S/P appendectomy     S/P colon resection 2015

## 2020-02-25 NOTE — H&P PST ADULT - NSICDXPASTMEDICALHX_GEN_ALL_CORE_FT
PAST MEDICAL HISTORY:  Anxiety     Diverticulitis h/o    Endometriosis determined by laparoscopy     GERD (gastroesophageal reflux disease)     Menopause praecox     Osteoporosis     Strabismus PAST MEDICAL HISTORY:  Anxiety     Diverticulitis h/o    Endometriosis determined by laparoscopy     GERD (gastroesophageal reflux disease)     Lymphadenopathy     Osteoporosis     Strabismus

## 2020-02-25 NOTE — H&P PST ADULT - NSANTHOSAYNRD_GEN_A_CORE
No. JOIE screening performed.  STOP BANG Legend: 0-2 = LOW Risk; 3-4 = INTERMEDIATE Risk; 5-8 = HIGH Risk

## 2020-02-26 DIAGNOSIS — R59.0 LOCALIZED ENLARGED LYMPH NODES: ICD-10-CM

## 2020-02-26 DIAGNOSIS — Z01.818 ENCOUNTER FOR OTHER PREPROCEDURAL EXAMINATION: ICD-10-CM

## 2020-02-28 ENCOUNTER — OUTPATIENT (OUTPATIENT)
Dept: INPATIENT UNIT | Facility: HOSPITAL | Age: 50
LOS: 1 days | Discharge: HOME | End: 2020-02-28
Payer: COMMERCIAL

## 2020-02-28 ENCOUNTER — APPOINTMENT (OUTPATIENT)
Dept: SURGERY | Facility: HOSPITAL | Age: 50
End: 2020-02-28

## 2020-02-28 ENCOUNTER — RESULT REVIEW (OUTPATIENT)
Age: 50
End: 2020-02-28

## 2020-02-28 VITALS
RESPIRATION RATE: 16 BRPM | SYSTOLIC BLOOD PRESSURE: 90 MMHG | HEART RATE: 66 BPM | DIASTOLIC BLOOD PRESSURE: 58 MMHG | WEIGHT: 85.1 LBS | TEMPERATURE: 98 F | HEIGHT: 61 IN | OXYGEN SATURATION: 100 %

## 2020-02-28 VITALS
OXYGEN SATURATION: 96 % | RESPIRATION RATE: 16 BRPM | TEMPERATURE: 98 F | HEART RATE: 80 BPM | SYSTOLIC BLOOD PRESSURE: 90 MMHG | DIASTOLIC BLOOD PRESSURE: 52 MMHG

## 2020-02-28 DIAGNOSIS — Z90.49 ACQUIRED ABSENCE OF OTHER SPECIFIED PARTS OF DIGESTIVE TRACT: Chronic | ICD-10-CM

## 2020-02-28 DIAGNOSIS — Z98.890 OTHER SPECIFIED POSTPROCEDURAL STATES: Chronic | ICD-10-CM

## 2020-02-28 DIAGNOSIS — R59.0 LOCALIZED ENLARGED LYMPH NODES: ICD-10-CM

## 2020-02-28 DIAGNOSIS — N80.9 ENDOMETRIOSIS, UNSPECIFIED: Chronic | ICD-10-CM

## 2020-02-28 LAB — HCG UR QL: NEGATIVE — SIGNIFICANT CHANGE UP

## 2020-02-28 PROCEDURE — 88333 PATH CONSLTJ SURG CYTO XM 1: CPT | Mod: 26

## 2020-02-28 PROCEDURE — 88305 TISSUE EXAM BY PATHOLOGIST: CPT | Mod: 26

## 2020-02-28 PROCEDURE — 81025 URINE PREGNANCY TEST: CPT

## 2020-02-28 PROCEDURE — 88305 TISSUE EXAM BY PATHOLOGIST: CPT

## 2020-02-28 PROCEDURE — 88333 PATH CONSLTJ SURG CYTO XM 1: CPT

## 2020-02-28 PROCEDURE — 38510 BIOPSY/REMOVAL LYMPH NODES: CPT

## 2020-02-28 RX ORDER — ACETAMINOPHEN 500 MG
975 TABLET ORAL ONCE
Refills: 0 | Status: COMPLETED | OUTPATIENT
Start: 2020-02-28 | End: 2020-02-28

## 2020-02-28 RX ORDER — HYDROMORPHONE HYDROCHLORIDE 2 MG/ML
0.5 INJECTION INTRAMUSCULAR; INTRAVENOUS; SUBCUTANEOUS
Refills: 0 | Status: DISCONTINUED | OUTPATIENT
Start: 2020-02-28 | End: 2020-02-28

## 2020-02-28 RX ORDER — OXYCODONE HYDROCHLORIDE 5 MG/1
5 TABLET ORAL ONCE
Refills: 0 | Status: DISCONTINUED | OUTPATIENT
Start: 2020-02-28 | End: 2020-02-28

## 2020-02-28 RX ORDER — FENTANYL CITRATE 50 UG/ML
50 INJECTION INTRAVENOUS
Refills: 0 | Status: DISCONTINUED | OUTPATIENT
Start: 2020-02-28 | End: 2020-02-28

## 2020-02-28 RX ORDER — FAMOTIDINE 10 MG/ML
20 INJECTION INTRAVENOUS ONCE
Refills: 0 | Status: COMPLETED | OUTPATIENT
Start: 2020-02-28 | End: 2020-02-28

## 2020-02-28 RX ORDER — SODIUM CHLORIDE 9 MG/ML
1000 INJECTION, SOLUTION INTRAVENOUS
Refills: 0 | Status: DISCONTINUED | OUTPATIENT
Start: 2020-02-28 | End: 2020-02-28

## 2020-02-28 RX ORDER — MEPERIDINE HYDROCHLORIDE 50 MG/ML
12.5 INJECTION INTRAMUSCULAR; INTRAVENOUS; SUBCUTANEOUS
Refills: 0 | Status: DISCONTINUED | OUTPATIENT
Start: 2020-02-28 | End: 2020-02-28

## 2020-02-28 RX ORDER — ONDANSETRON 8 MG/1
4 TABLET, FILM COATED ORAL ONCE
Refills: 0 | Status: DISCONTINUED | OUTPATIENT
Start: 2020-02-28 | End: 2020-02-28

## 2020-02-28 RX ADMIN — OXYCODONE HYDROCHLORIDE 5 MILLIGRAM(S): 5 TABLET ORAL at 14:24

## 2020-02-28 RX ADMIN — OXYCODONE HYDROCHLORIDE 5 MILLIGRAM(S): 5 TABLET ORAL at 14:23

## 2020-02-28 RX ADMIN — Medication 975 MILLIGRAM(S): at 11:53

## 2020-02-28 RX ADMIN — FAMOTIDINE 20 MILLIGRAM(S): 10 INJECTION INTRAVENOUS at 11:53

## 2020-02-28 NOTE — ASU PREOP CHECKLIST - TEMPERATURE IN FAHRENHEIT (DEGREES F)
98.3
AMA (saw a physician/midlevel provider and clinician was able to provide reasons for staying for treatment & form is signed)

## 2020-02-28 NOTE — ASU DISCHARGE PLAN (ADULT/PEDIATRIC) - CARE PROVIDER_API CALL
Benjamin Hairston (DO)  Surgery; Surgical Critical Care  [address], [address]st [place] [address]  Phone: [phone]  Fax: [phone]  Follow Up Time:

## 2020-02-28 NOTE — ASU DISCHARGE PLAN (ADULT/PEDIATRIC) - NURSING INSTRUCTIONS
For any problems or concerns,contact your doctor. [facility] patients should call the [facility]. If you cannot reach the doctor or clinic, call [facility] Emergency Department at [phone] or go to your local Emergency Department.  A responsible adult should be with you for the rest of the day and night for your safety and to help you if you needed. Resume your medications as listed on the attached Medication Record. Begin with liquids and light food ( tea, toast, Jello, soups). Advance to what you normally eat. Liquids should taken in adequate amounts today.     CALL the DOCTOR:    -Fever greater than  101F  - Signs  of infection such as : increase pain,swelling,redness,or a bad  smell coming from the wound.  -Excessive amount of bleeding.  - Any pain that appears to be getting worse.  - Vomiting  -  If you have  not urinated 8 hours after surgery or have any difficulty urinating.     A responsible adult should be with you for the rest of the day and night for your safety and to help you if you needed.    Review attached FACT SHEET if applicable.

## 2020-02-28 NOTE — ASU DISCHARGE PLAN (ADULT/PEDIATRIC) - CALL YOUR DOCTOR IF YOU HAVE ANY OF THE FOLLOWING:
Pain not relieved by Medications/Wound/Surgical Site with redness, or foul smelling discharge or pus/Bleeding that does not stop

## 2020-03-05 DIAGNOSIS — F41.9 ANXIETY DISORDER, UNSPECIFIED: ICD-10-CM

## 2020-03-05 DIAGNOSIS — Z88.8 ALLERGY STATUS TO OTHER DRUGS, MEDICAMENTS AND BIOLOGICAL SUBSTANCES STATUS: ICD-10-CM

## 2020-03-05 DIAGNOSIS — R59.0 LOCALIZED ENLARGED LYMPH NODES: ICD-10-CM

## 2020-03-05 DIAGNOSIS — Z90.49 ACQUIRED ABSENCE OF OTHER SPECIFIED PARTS OF DIGESTIVE TRACT: ICD-10-CM

## 2020-03-05 DIAGNOSIS — K21.9 GASTRO-ESOPHAGEAL REFLUX DISEASE WITHOUT ESOPHAGITIS: ICD-10-CM

## 2020-05-31 ENCOUNTER — TRANSCRIPTION ENCOUNTER (OUTPATIENT)
Age: 50
End: 2020-05-31

## 2021-01-03 ENCOUNTER — TRANSCRIPTION ENCOUNTER (OUTPATIENT)
Age: 51
End: 2021-01-03

## 2021-03-08 PROBLEM — K57.92 DIVERTICULITIS OF INTESTINE, PART UNSPECIFIED, WITHOUT PERFORATION OR ABSCESS WITHOUT BLEEDING: Chronic | Status: ACTIVE | Noted: 2020-02-25

## 2021-03-08 PROBLEM — K21.9 GASTRO-ESOPHAGEAL REFLUX DISEASE WITHOUT ESOPHAGITIS: Chronic | Status: ACTIVE | Noted: 2020-02-25

## 2021-03-08 PROBLEM — H50.9 UNSPECIFIED STRABISMUS: Chronic | Status: ACTIVE | Noted: 2020-02-25

## 2021-03-08 PROBLEM — M81.0 AGE-RELATED OSTEOPOROSIS WITHOUT CURRENT PATHOLOGICAL FRACTURE: Chronic | Status: ACTIVE | Noted: 2020-02-25

## 2021-03-08 PROBLEM — R59.1 GENERALIZED ENLARGED LYMPH NODES: Chronic | Status: ACTIVE | Noted: 2020-02-25

## 2021-04-13 ENCOUNTER — APPOINTMENT (OUTPATIENT)
Dept: RHEUMATOLOGY | Facility: CLINIC | Age: 51
End: 2021-04-13

## 2021-08-24 ENCOUNTER — EMERGENCY (EMERGENCY)
Facility: HOSPITAL | Age: 51
LOS: 1 days | Discharge: HOME | End: 2021-08-24
Attending: EMERGENCY MEDICINE | Admitting: EMERGENCY MEDICINE
Payer: COMMERCIAL

## 2021-08-24 VITALS
OXYGEN SATURATION: 95 % | DIASTOLIC BLOOD PRESSURE: 75 MMHG | HEIGHT: 61 IN | SYSTOLIC BLOOD PRESSURE: 110 MMHG | HEART RATE: 103 BPM | WEIGHT: 82.01 LBS | TEMPERATURE: 98 F | RESPIRATION RATE: 18 BRPM

## 2021-08-24 VITALS
DIASTOLIC BLOOD PRESSURE: 82 MMHG | OXYGEN SATURATION: 96 % | TEMPERATURE: 98 F | SYSTOLIC BLOOD PRESSURE: 112 MMHG | HEART RATE: 98 BPM | RESPIRATION RATE: 18 BRPM

## 2021-08-24 DIAGNOSIS — Z90.49 ACQUIRED ABSENCE OF OTHER SPECIFIED PARTS OF DIGESTIVE TRACT: Chronic | ICD-10-CM

## 2021-08-24 DIAGNOSIS — N80.9 ENDOMETRIOSIS, UNSPECIFIED: Chronic | ICD-10-CM

## 2021-08-24 DIAGNOSIS — Z98.890 OTHER SPECIFIED POSTPROCEDURAL STATES: Chronic | ICD-10-CM

## 2021-08-24 PROCEDURE — 73110 X-RAY EXAM OF WRIST: CPT | Mod: 26,LT

## 2021-08-24 PROCEDURE — 73110 X-RAY EXAM OF WRIST: CPT

## 2021-08-24 PROCEDURE — 73090 X-RAY EXAM OF FOREARM: CPT

## 2021-08-24 PROCEDURE — 73100 X-RAY EXAM OF WRIST: CPT | Mod: 26,59,LT

## 2021-08-24 PROCEDURE — 73100 X-RAY EXAM OF WRIST: CPT

## 2021-08-24 PROCEDURE — 99284 EMERGENCY DEPT VISIT MOD MDM: CPT | Mod: 25

## 2021-08-24 PROCEDURE — 99284 EMERGENCY DEPT VISIT MOD MDM: CPT

## 2021-08-24 PROCEDURE — 73090 X-RAY EXAM OF FOREARM: CPT | Mod: 26,LT

## 2021-08-24 PROCEDURE — 29125 APPL SHORT ARM SPLINT STATIC: CPT | Mod: LT

## 2021-08-24 RX ORDER — ACETAMINOPHEN 500 MG
650 TABLET ORAL ONCE
Refills: 0 | Status: COMPLETED | OUTPATIENT
Start: 2021-08-24 | End: 2021-08-24

## 2021-08-24 RX ORDER — OXYCODONE HYDROCHLORIDE 5 MG/1
5 TABLET ORAL ONCE
Refills: 0 | Status: DISCONTINUED | OUTPATIENT
Start: 2021-08-24 | End: 2021-08-24

## 2021-08-24 RX ADMIN — Medication 650 MILLIGRAM(S): at 22:21

## 2021-08-24 RX ADMIN — OXYCODONE HYDROCHLORIDE 5 MILLIGRAM(S): 5 TABLET ORAL at 23:12

## 2021-08-24 NOTE — ED ADULT TRIAGE NOTE - CHIEF COMPLAINT QUOTE
patient came in ED from home BIBA. c/o left FA pain, s/p slipped and fall X 3ft height. alcohol intoxicated.

## 2021-08-24 NOTE — ED ADULT NURSE NOTE - OBJECTIVE STATEMENT
Received Pt awake and alert, intoxicated, admits to drinking, Pt slipped getting out of hot tub and injured left arm, ortho at bedside.

## 2021-08-24 NOTE — CONSULT NOTE ADULT - SUBJECTIVE AND OBJECTIVE BOX
Pt is a 50 yo female BIBA after falling 3 feet off edge of a bathtub a short time ago. Pt has been drinking wine. Pt is RHD. C/O severe pain. Denies LOC. Did not hit her head. Denies pain elsewhere.     PAST MEDICAL & SURGICAL HISTORY:  Anxiety  Endometriosis determined by laparoscopy  Strabismus  GERD (gastroesophageal reflux disease)  Diverticulitis  Osteoporosis  Lymphadenopathy  Endometriosis determined by laparoscopy (over 15 years ago)  History of strabismus surgery  S/P colon resection 2015  S/P appendectomy      Allergies  predniSONE (Unknown)    Home Medications:  Ambien: 12.5 milligram(s) orally once a day (at bedtime) (28 Feb 2020 11:35)  Lexapro 20 mg oral tablet: 1 tab(s) orally once a day (28 Feb 2020 11:35)  NexIUM 20 mg oral delayed release capsule: 1 cap(s) orally once a day (28 Feb 2020 11:35)  Valium 10 mg oral tablet: 2 tab(s) orally 2 times a day (28 Feb 2020 11:35)      Vital Signs Last 24 Hrs  T(C): 36.7 (24 Aug 2021 21:26), Max: 36.7 (24 Aug 2021 21:26)  T(F): 98.1 (24 Aug 2021 21:26), Max: 98.1 (24 Aug 2021 21:26)  HR: 103 (24 Aug 2021 21:26) (103 - 103)  BP: 110/75 (24 Aug 2021 21:26) (110/75 - 110/75)  BP(mean): --  RR: 18 (24 Aug 2021 21:26) (18 - 18)  SpO2: 95% (24 Aug 2021 21:26) (95% - 95%)     Pt is a 52 yo female BIBA after falling 3 feet off edge of a bathtub a short time ago. Pt has been drinking wine. Pt is RHD. C/O severe pain. Denies LOC. Did not hit her head. Denies pain elsewhere.     PAST MEDICAL & SURGICAL HISTORY:  Anxiety  Endometriosis determined by laparoscopy  Strabismus  GERD (gastroesophageal reflux disease)  Diverticulitis  Osteoporosis  Lymphadenopathy  Endometriosis determined by laparoscopy (over 15 years ago)  History of strabismus surgery  S/P colon resection 2015  S/P appendectomy      Allergies  predniSONE (Unknown)    Home Medications:  Ambien: 12.5 milligram(s) orally once a day (at bedtime) (28 Feb 2020 11:35)  Lexapro 20 mg oral tablet: 1 tab(s) orally once a day (28 Feb 2020 11:35)  NexIUM 20 mg oral delayed release capsule: 1 cap(s) orally once a day (28 Feb 2020 11:35)  Valium 10 mg oral tablet: 2 tab(s) orally 2 times a day (28 Feb 2020 11:35)      Vital Signs Last 24 Hrs  T(C): 36.7 (24 Aug 2021 21:26), Max: 36.7 (24 Aug 2021 21:26)  T(F): 98.1 (24 Aug 2021 21:26), Max: 98.1 (24 Aug 2021 21:26)  HR: 103 (24 Aug 2021 21:26) (103 - 103)  BP: 110/75 (24 Aug 2021 21:26) (110/75 - 110/75)  BP(mean): --  RR: 18 (24 Aug 2021 21:26) (18 - 18)  SpO2: 95% (24 Aug 2021 21:26) (95% - 95%)      PE:   Gen: Frail, agitated.  Neuro: Awake.    HEENT: Facial flushing. EOMI. No assymetry.   LUE: Distal radius + Dorsal wrist deformity. Skin intact. Minimal edema. +ROM shoulder/elbow//fingers. Decreased ROM wrist. +ok/thumbsup/fingercross signs.  strength: 5/5.  RP2+ NVI.  RUE: Skin intact. +ROM shoulder/elbow/wrist/fingers. +ok/thumbsup/fingercross signs.  strength: 5/5.  RP2+ NVI.   B/L LE: Skin intact. +ROM hip/knee/ankle/toes. Ankle Dorsi/plantarflexion: 5/5. Calf: soft, compressible and nontender. DP/PT 2+ NVI.

## 2021-08-24 NOTE — ED PROVIDER NOTE - PATIENT PORTAL LINK FT
You can access the FollowMyHealth Patient Portal offered by [facility] by registering at the following website: [email or web address]. By joining [facility]’s FollowMyHealth portal, you will also be able to view your health information using other applications (apps) compatible with our system.

## 2021-08-24 NOTE — ED PROVIDER NOTE - NSFOLLOWUPINSTRUCTIONS_ED_ALL_ED_FT
Patient called back this afternoon with same question as below.  Please address in Dr Fisher's absence.  Thank you!   WHAT YOU NEED TO KNOW:    A wrist fracture is a break in one or more of the bones in your wrist.     Adult Arm Bones         DISCHARGE INSTRUCTIONS:    Return to the emergency department if:   •Your pain gets worse or does not get better after you take pain medicine.      •Your cast or splint breaks, gets wet, or is damaged.      •Your hand or fingers feel numb or cold.      •Your hand or fingers turn white or blue.      •Your splint or cast feels too tight.      •You have more pain or swelling after the cast or splint is put on.      Call your doctor if:   •You have a fever.      •There is a foul smell or blood coming from under the cast.      •You have questions or concerns about your condition or care.      Medicines: You may need any of the following:   •Prescription pain medicine may be given. Ask your healthcare provider how to take this medicine safely. Some prescription pain medicines contain acetaminophen. Do not take other medicines that contain acetaminophen without talking to your healthcare provider. Too much acetaminophen may cause liver damage. Prescription pain medicine may cause constipation. Ask your healthcare provider how to prevent or treat constipation.       •NSAIDs, such as ibuprofen, help decrease swelling, pain, and fever. NSAIDs can cause stomach bleeding or kidney problems in certain people. If you take blood thinner medicine, always ask your healthcare provider if NSAIDs are safe for you. Always read the medicine label and follow directions.      •Acetaminophen decreases pain and fever. It is available without a doctor's order. Ask how much to take and how often to take it. Follow directions. Read the labels of all other medicines you are using to see if they also contain acetaminophen, or ask your doctor or pharmacist. Acetaminophen can cause liver damage if not taken correctly. Do not use more than 4 grams (4,000 milligrams) total of acetaminophen in one day.       •Take your medicine as directed. Contact your healthcare provider if you think your medicine is not helping or if you have side effects. Tell him or her if you are allergic to any medicine. Keep a list of the medicines, vitamins, and herbs you take. Include the amounts, and when and why you take them. Bring the list or the pill bottles to follow-up visits. Carry your medicine list with you in case of an emergency.      Self-care:   •Rest as much as possible. Do not play contact sports until the healthcare provider says it is okay.      •Apply ice on your wrist for 15 to 20 minutes every hour or as directed. Use an ice pack, or put crushed ice in a plastic bag. Cover it with a towel before you place it on your skin. Ice helps prevent tissue damage and decreases swelling and pain.      •Elevate your wrist above the level of your heart as often as possible. This will help decrease swelling and pain. Prop your wrist on pillows or blankets to keep it elevated comfortably.             Cast or splint care:   •You may take a bath or shower as directed. Do not let your cast or splint get wet. Before bathing, cover the cast or splint with 2 plastic trash bags. Tape the bags to your skin above the cast or splint to seal out the water. Keep your arm out of the water in case the bag breaks. If a plaster cast gets wet and soft, call your healthcare provider.      •Check the skin around the cast or splint every day. You may put lotion on any red or sore areas.      •Do not push down or lean on the cast or brace because it may break.      •Do not scratch the skin under the cast by putting a sharp or pointed object inside the cast.      Go to physical therapy as directed: You may need physical therapy after your wrist heals and the cast is removed. A physical therapist can teach you exercises to help improve movement and strength and to decrease pain.    Follow up with your doctor or bone specialist as directed: You may need to return to have your cast removed. You may also need an x-ray to check how well the bone has healed. Write down your questions so you remember to ask them during your visits.       © Copyright [employer] 2021           back to top                          © Copyright [employer] 2021

## 2021-08-24 NOTE — ED PROVIDER NOTE - CARE PROVIDER_API CALL
Doc Garcia)  Orthopaedic Surgery Surgery  [address] [place] [address]  Phone: [phone]  Fax: [phone]  Follow Up Time:

## 2021-08-24 NOTE — ED PROVIDER NOTE - NSICDXPASTMEDICALHX_GEN_ALL_CORE_FT
PAST MEDICAL HISTORY:  Anxiety     Diverticulitis h/o    Endometriosis determined by laparoscopy     GERD (gastroesophageal reflux disease)     Lymphadenopathy     Osteoporosis     Strabismus

## 2021-08-24 NOTE — CONSULT NOTE ADULT - ASSESSMENT
Left DR fracture.  Xray: Dorsally displaced left DR fracture with questionable ulnar styloid fracture.  Hematoma block: 1% lidocaine: 5 cc injected  Plaster Sugar tong splint applied   F/U post splint/reduction Xrays  Sling to [place]  Pain control  Elevation, ICE  F/U with Dr Garcia this week approx 3-5 days.  Pt may need surgical fixation.   D/W ER attending and Dr Zepeda who agrees with plan.

## 2021-08-24 NOTE — ED PROVIDER NOTE - OBJECTIVE STATEMENT
51 female presents to ER by ambulance with report of fall while trying to get out of the pool with left wrist injury, patient had etoh, is inebriated, friend at the bedside, no LOC, no syncope, c/o left wrist pain.

## 2021-10-15 ENCOUNTER — OUTPATIENT (OUTPATIENT)
Dept: OUTPATIENT SERVICES | Facility: HOSPITAL | Age: 51
LOS: 1 days | End: 2021-10-15
Payer: COMMERCIAL

## 2021-10-15 ENCOUNTER — APPOINTMENT (OUTPATIENT)
Dept: INTERNAL MEDICINE | Facility: CLINIC | Age: 51
End: 2021-10-15

## 2021-10-15 VITALS
TEMPERATURE: 97 F | SYSTOLIC BLOOD PRESSURE: 126 MMHG | RESPIRATION RATE: 16 BRPM | HEIGHT: 61 IN | DIASTOLIC BLOOD PRESSURE: 88 MMHG | OXYGEN SATURATION: 100 % | HEART RATE: 80 BPM | WEIGHT: 80.03 LBS

## 2021-10-15 DIAGNOSIS — S52.532P COLLES' FRACTURE OF LEFT RADIUS, SUBSEQUENT ENCOUNTER FOR CLOSED FRACTURE WITH MALUNION: ICD-10-CM

## 2021-10-15 DIAGNOSIS — Z90.49 ACQUIRED ABSENCE OF OTHER SPECIFIED PARTS OF DIGESTIVE TRACT: Chronic | ICD-10-CM

## 2021-10-15 DIAGNOSIS — N80.9 ENDOMETRIOSIS, UNSPECIFIED: Chronic | ICD-10-CM

## 2021-10-15 DIAGNOSIS — Z01.818 ENCOUNTER FOR OTHER PREPROCEDURAL EXAMINATION: ICD-10-CM

## 2021-10-15 DIAGNOSIS — Z98.890 OTHER SPECIFIED POSTPROCEDURAL STATES: Chronic | ICD-10-CM

## 2021-10-15 LAB
ALBUMIN SERPL ELPH-MCNC: 4.1 G/DL — SIGNIFICANT CHANGE UP (ref 3.3–5)
ALP SERPL-CCNC: 52 U/L — SIGNIFICANT CHANGE UP (ref 40–120)
ALT FLD-CCNC: 34 U/L — SIGNIFICANT CHANGE UP (ref 12–78)
ANION GAP SERPL CALC-SCNC: 7 MMOL/L — SIGNIFICANT CHANGE UP (ref 5–17)
AST SERPL-CCNC: 23 U/L — SIGNIFICANT CHANGE UP (ref 15–37)
BILIRUB SERPL-MCNC: 0.4 MG/DL — SIGNIFICANT CHANGE UP (ref 0.2–1.2)
BUN SERPL-MCNC: 11 MG/DL — SIGNIFICANT CHANGE UP (ref 7–23)
CALCIUM SERPL-MCNC: 8.9 MG/DL — SIGNIFICANT CHANGE UP (ref 8.5–10.1)
CHLORIDE SERPL-SCNC: 101 MMOL/L — SIGNIFICANT CHANGE UP (ref 96–108)
CO2 SERPL-SCNC: 27 MMOL/L — SIGNIFICANT CHANGE UP (ref 22–31)
CREAT SERPL-MCNC: 0.71 MG/DL — SIGNIFICANT CHANGE UP (ref 0.5–1.3)
GLUCOSE SERPL-MCNC: 81 MG/DL — SIGNIFICANT CHANGE UP (ref 70–99)
HCG UR QL: NEGATIVE — SIGNIFICANT CHANGE UP
HCT VFR BLD CALC: 44 % — SIGNIFICANT CHANGE UP (ref 34.5–45)
HGB BLD-MCNC: 15.4 G/DL — SIGNIFICANT CHANGE UP (ref 11.5–15.5)
MCHC RBC-ENTMCNC: 33.2 PG — SIGNIFICANT CHANGE UP (ref 27–34)
MCHC RBC-ENTMCNC: 35 GM/DL — SIGNIFICANT CHANGE UP (ref 32–36)
MCV RBC AUTO: 94.8 FL — SIGNIFICANT CHANGE UP (ref 80–100)
NRBC # BLD: 0 /100 WBCS — SIGNIFICANT CHANGE UP (ref 0–0)
PLATELET # BLD AUTO: 284 K/UL — SIGNIFICANT CHANGE UP (ref 150–400)
POTASSIUM SERPL-MCNC: 3.1 MMOL/L — LOW (ref 3.5–5.3)
POTASSIUM SERPL-SCNC: 3.1 MMOL/L — LOW (ref 3.5–5.3)
PROT SERPL-MCNC: 7.7 G/DL — SIGNIFICANT CHANGE UP (ref 6–8.3)
RBC # BLD: 4.64 M/UL — SIGNIFICANT CHANGE UP (ref 3.8–5.2)
RBC # FLD: 11.4 % — SIGNIFICANT CHANGE UP (ref 10.3–14.5)
SODIUM SERPL-SCNC: 135 MMOL/L — SIGNIFICANT CHANGE UP (ref 135–145)
WBC # BLD: 5.58 K/UL — SIGNIFICANT CHANGE UP (ref 3.8–10.5)
WBC # FLD AUTO: 5.58 K/UL — SIGNIFICANT CHANGE UP (ref 3.8–10.5)

## 2021-10-15 PROCEDURE — 85027 COMPLETE CBC AUTOMATED: CPT

## 2021-10-15 PROCEDURE — 81025 URINE PREGNANCY TEST: CPT

## 2021-10-15 PROCEDURE — G0463: CPT

## 2021-10-15 PROCEDURE — 36415 COLL VENOUS BLD VENIPUNCTURE: CPT

## 2021-10-15 PROCEDURE — 80053 COMPREHEN METABOLIC PANEL: CPT

## 2021-10-15 PROCEDURE — 93005 ELECTROCARDIOGRAM TRACING: CPT

## 2021-10-15 PROCEDURE — 93010 ELECTROCARDIOGRAM REPORT: CPT

## 2021-10-15 RX ORDER — DIAZEPAM 5 MG
2 TABLET ORAL
Qty: 0 | Refills: 0 | DISCHARGE

## 2021-10-15 RX ORDER — ESCITALOPRAM OXALATE 10 MG/1
1 TABLET, FILM COATED ORAL
Qty: 0 | Refills: 0 | DISCHARGE

## 2021-10-15 NOTE — H&P PST ADULT - NSICDXPASTMEDICALHX_GEN_ALL_CORE_FT
PAST MEDICAL HISTORY:  Anxiety     Closed Colles' fracture of left radius with malunion, subsequent encounter     Diverticulitis h/o    Endometriosis determined by laparoscopy     GERD (gastroesophageal reflux disease)     H/O alopecia     H/O heartburn     History of diverticulitis     Lymphadenopathy     Osteoporosis     Strabismus

## 2021-10-15 NOTE — H&P PST ADULT - PROBLEM SELECTOR PLAN 1
check labs cbc cmp   ekg  medical clearance  preop instructions were given  10/15 stop tumeric pumpkin seed oil prenatal vitamin rosemary extract saw palmetto nutrafol   morning of surgery take Valium and Zyrtec Nexium with a tiny sip of water  patient is aware that she will need covid testing done at [facility] site 24-72 hours before surgery written contact information given  patient is aware that she will need to socially isolate after testing  hibiclens x3 days with writtenand verbal instructions givne  patient verbalizes understanding of instructions

## 2021-10-15 NOTE — H&P PST ADULT - NSICDXPASTSURGICALHX_GEN_ALL_CORE_FT
PAST SURGICAL HISTORY:  Endometriosis determined by laparoscopy over 15 years ago    History of strabismus surgery 2x    S/P appendectomy 2015    S/P colon resection 2015

## 2021-10-15 NOTE — H&P PST ADULT - ASSESSMENT
52 yo female with Anxiety Depression Osteoporosis  GERD scheduled for Open Reduction Internal Fixation Left Distal Radial Malunion w/Poss Bone Grafting and poss Fixation of Distal Radial Ulnar Joint w/Mini Fluoroscopy on 10/21/21 with Dr Veliz.

## 2021-10-15 NOTE — H&P PST ADULT - NEGATIVE ENMT SYMPTOMS
no hearing difficulty/no ear pain/no tinnitus/no vertigo/no sinus symptoms/no nasal discharge/no nasal obstruction/no throat pain

## 2021-10-15 NOTE — H&P PST ADULT - HISTORY OF PRESENT ILLNESS
52 yo female scheduled for Open Reduction Internal Fixation Left Distal Radial Malunion w/Poss Bone Grafting and poss Fixation of Distal Radial Ulnar Joint w/Mini Fluoroscopy on 10/21/21 with Dr Veliz.  Patient states she fell on 8/24/21 and injured her left wrist.  She was in a cast for 6 weeks. Fracture is not healing correctly.  Patient is right hand dominant 50 yo female with Anxiety Depression Osteoporosis  GERD scheduled for Open Reduction Internal Fixation Left Distal Radial Malunion w/Poss Bone Grafting and poss Fixation of Distal Radial Ulnar Joint w/Mini Fluoroscopy on 10/21/21 with Dr Veliz.  Patient states she fell on 8/24/21 and injured her left wrist.  She was in a cast for 6 weeks. Fracture is not healing correctly. Current pain is steady at 8/10.  Patient is right hand dominant

## 2021-10-18 PROBLEM — Z87.19 PERSONAL HISTORY OF OTHER DISEASES OF THE DIGESTIVE SYSTEM: Chronic | Status: ACTIVE | Noted: 2021-10-15

## 2021-10-18 PROBLEM — Z87.898 PERSONAL HISTORY OF OTHER SPECIFIED CONDITIONS: Chronic | Status: ACTIVE | Noted: 2021-10-15

## 2021-10-18 PROBLEM — S52.532P: Chronic | Status: ACTIVE | Noted: 2021-10-15

## 2021-10-19 ENCOUNTER — OUTPATIENT (OUTPATIENT)
Dept: OUTPATIENT SERVICES | Facility: HOSPITAL | Age: 51
LOS: 1 days | End: 2021-10-19
Payer: COMMERCIAL

## 2021-10-19 ENCOUNTER — APPOINTMENT (OUTPATIENT)
Dept: INTERNAL MEDICINE | Facility: CLINIC | Age: 51
End: 2021-10-19
Payer: COMMERCIAL

## 2021-10-19 VITALS
RESPIRATION RATE: 14 BRPM | HEIGHT: 61 IN | BODY MASS INDEX: 15.29 KG/M2 | HEART RATE: 78 BPM | TEMPERATURE: 97.2 F | WEIGHT: 81 LBS | OXYGEN SATURATION: 98 %

## 2021-10-19 VITALS — SYSTOLIC BLOOD PRESSURE: 102 MMHG | DIASTOLIC BLOOD PRESSURE: 70 MMHG

## 2021-10-19 DIAGNOSIS — F41.9 ANXIETY DISORDER, UNSPECIFIED: ICD-10-CM

## 2021-10-19 DIAGNOSIS — N80.9 ENDOMETRIOSIS, UNSPECIFIED: Chronic | ICD-10-CM

## 2021-10-19 DIAGNOSIS — Z01.818 ENCOUNTER FOR OTHER PREPROCEDURAL EXAMINATION: ICD-10-CM

## 2021-10-19 DIAGNOSIS — R94.31 ABNORMAL ELECTROCARDIOGRAM [ECG] [EKG]: ICD-10-CM

## 2021-10-19 DIAGNOSIS — Z20.828 CONTACT WITH AND (SUSPECTED) EXPOSURE TO OTHER VIRAL COMMUNICABLE DISEASES: ICD-10-CM

## 2021-10-19 DIAGNOSIS — E87.6 HYPOKALEMIA: ICD-10-CM

## 2021-10-19 DIAGNOSIS — Z98.890 OTHER SPECIFIED POSTPROCEDURAL STATES: Chronic | ICD-10-CM

## 2021-10-19 DIAGNOSIS — Z90.49 ACQUIRED ABSENCE OF OTHER SPECIFIED PARTS OF DIGESTIVE TRACT: Chronic | ICD-10-CM

## 2021-10-19 LAB — SARS-COV-2 RNA SPEC QL NAA+PROBE: SIGNIFICANT CHANGE UP

## 2021-10-19 PROCEDURE — U0003: CPT

## 2021-10-19 PROCEDURE — 99214 OFFICE O/P EST MOD 30 MIN: CPT

## 2021-10-19 PROCEDURE — U0005: CPT

## 2021-10-19 RX ORDER — DIAZEPAM 10 MG/1
10 TABLET ORAL TWICE DAILY
Refills: 0 | Status: ACTIVE | COMMUNITY

## 2021-10-19 RX ORDER — FLUOXETINE HYDROCHLORIDE 20 MG/1
20 CAPSULE ORAL
Refills: 0 | Status: ACTIVE | COMMUNITY

## 2021-10-20 ENCOUNTER — TRANSCRIPTION ENCOUNTER (OUTPATIENT)
Age: 51
End: 2021-10-20

## 2021-10-20 LAB — POTASSIUM SERPL-SCNC: 3.8 MMOL/L

## 2021-10-20 NOTE — RESULTS/DATA
[] : results reviewed [ECG Reviewed] : reviewed [NSR] : normal sinus rhythm [Poor R Wave Progression] : poor R-wave progresion [No Interval Change] : no interval change [de-identified] : normal [de-identified] : K 3.1

## 2021-10-20 NOTE — PHYSICAL EXAM
[No Acute Distress] : no acute distress [Well Nourished] : well nourished [Well Developed] : well developed [Well-Appearing] : well-appearing [Normal Voice/Communication] : normal voice/communication [Normal Sclera/Conjunctiva] : normal sclera/conjunctiva [PERRL] : pupils equal round and reactive to light [EOMI] : extraocular movements intact [Normal Outer Ear/Nose] : the outer ears and nose were normal in appearance [Normal Oropharynx] : the oropharynx was normal [No JVD] : no jugular venous distention [No Lymphadenopathy] : no lymphadenopathy [Supple] : supple [Thyroid Normal, No Nodules] : the thyroid was normal and there were no nodules present [No Respiratory Distress] : no respiratory distress  [No Accessory Muscle Use] : no accessory muscle use [Clear to Auscultation] : lungs were clear to auscultation bilaterally [Normal Rate] : normal rate  [Regular Rhythm] : with a regular rhythm [Normal S1, S2] : normal S1 and S2 [No Murmur] : no murmur heard [No Carotid Bruits] : no carotid bruits [No Abdominal Bruit] : a ~M bruit was not heard ~T in the abdomen [No Varicosities] : no varicosities [Pedal Pulses Present] : the pedal pulses are present [No Edema] : there was no peripheral edema [No Palpable Aorta] : no palpable aorta [No Extremity Clubbing/Cyanosis] : no extremity clubbing/cyanosis [Soft] : abdomen soft [Non Tender] : non-tender [Non-distended] : non-distended [No Masses] : no abdominal mass palpated [No HSM] : no HSM [Normal Bowel Sounds] : normal bowel sounds [Normal Posterior Cervical Nodes] : no posterior cervical lymphadenopathy [Normal Anterior Cervical Nodes] : no anterior cervical lymphadenopathy [No CVA Tenderness] : no CVA  tenderness [No Spinal Tenderness] : no spinal tenderness [No Joint Swelling] : no joint swelling [Grossly Normal Strength/Tone] : grossly normal strength/tone [No Rash] : no rash [Coordination Grossly Intact] : coordination grossly intact [No Focal Deficits] : no focal deficits [Normal Gait] : normal gait [Deep Tendon Reflexes (DTR)] : deep tendon reflexes were 2+ and symmetric [Normal Affect] : the affect was normal [Normal Insight/Judgement] : insight and judgment were intact [Normal] : affect was normal and insight and judgment were intact [de-identified] : left wrist Splint

## 2021-10-20 NOTE — ASSESSMENT
[Patient Optimized for Surgery] : Patient optimized for surgery [Continue medications as is] : Continue current medications [FreeTextEntry4] : cleared

## 2021-10-20 NOTE — HISTORY OF PRESENT ILLNESS
[Aortic Stenosis] : no aortic stenosis [Atrial Fibrillation] : no atrial fibrillation [Coronary Artery Disease] : no coronary artery disease [Recent Myocardial Infarction] : no recent myocardial infarction [Implantable Device/Pacemaker] : no implantable device/pacemaker [Asthma] : no asthma [COPD] : no COPD [Sleep Apnea] : no sleep apnea [Smoker] : not a smoker [Family Member] : no family member with adverse anesthesia reaction/sudden death [Self] : no previous adverse anesthesia reaction [Chronic Anticoagulation] : no chronic anticoagulation [Chronic Kidney Disease] : no chronic kidney disease [Diabetes] : no diabetes [FreeTextEntry1] : repair of left wrist fracture  [FreeTextEntry2] : October 21, 2021 [FreeTextEntry3] : Dr. Veliz [FreeTextEntry4] : Pre Op

## 2021-10-21 ENCOUNTER — OUTPATIENT (OUTPATIENT)
Dept: OUTPATIENT SERVICES | Facility: HOSPITAL | Age: 51
LOS: 1 days | End: 2021-10-21
Payer: COMMERCIAL

## 2021-10-21 VITALS
OXYGEN SATURATION: 99 % | DIASTOLIC BLOOD PRESSURE: 65 MMHG | RESPIRATION RATE: 14 BRPM | WEIGHT: 80.03 LBS | TEMPERATURE: 98 F | SYSTOLIC BLOOD PRESSURE: 109 MMHG | HEART RATE: 62 BPM | HEIGHT: 61 IN

## 2021-10-21 VITALS
DIASTOLIC BLOOD PRESSURE: 49 MMHG | OXYGEN SATURATION: 95 % | HEART RATE: 98 BPM | RESPIRATION RATE: 13 BRPM | SYSTOLIC BLOOD PRESSURE: 82 MMHG

## 2021-10-21 DIAGNOSIS — Z90.49 ACQUIRED ABSENCE OF OTHER SPECIFIED PARTS OF DIGESTIVE TRACT: Chronic | ICD-10-CM

## 2021-10-21 DIAGNOSIS — S52.532P COLLES' FRACTURE OF LEFT RADIUS, SUBSEQUENT ENCOUNTER FOR CLOSED FRACTURE WITH MALUNION: ICD-10-CM

## 2021-10-21 DIAGNOSIS — N80.9 ENDOMETRIOSIS, UNSPECIFIED: Chronic | ICD-10-CM

## 2021-10-21 DIAGNOSIS — Z98.890 OTHER SPECIFIED POSTPROCEDURAL STATES: Chronic | ICD-10-CM

## 2021-10-21 PROCEDURE — C1713: CPT

## 2021-10-21 PROCEDURE — 76000 FLUOROSCOPY <1 HR PHYS/QHP: CPT

## 2021-10-21 PROCEDURE — 25400 REPAIR RADIUS OR ULNA: CPT | Mod: LT

## 2021-10-21 RX ORDER — CEFAZOLIN SODIUM 1 G
2000 VIAL (EA) INJECTION ONCE
Refills: 0 | Status: COMPLETED | OUTPATIENT
Start: 2021-10-21 | End: 2021-10-21

## 2021-10-21 RX ORDER — SODIUM CHLORIDE 9 MG/ML
1000 INJECTION, SOLUTION INTRAVENOUS
Refills: 0 | Status: DISCONTINUED | OUTPATIENT
Start: 2021-10-21 | End: 2021-10-21

## 2021-10-21 RX ORDER — ACETAMINOPHEN 500 MG
1000 TABLET ORAL ONCE
Refills: 0 | Status: COMPLETED | OUTPATIENT
Start: 2021-10-21 | End: 2021-10-21

## 2021-10-21 RX ORDER — HYDROMORPHONE HYDROCHLORIDE 2 MG/ML
0.5 INJECTION INTRAMUSCULAR; INTRAVENOUS; SUBCUTANEOUS ONCE
Refills: 0 | Status: DISCONTINUED | OUTPATIENT
Start: 2021-10-21 | End: 2021-10-21

## 2021-10-21 RX ORDER — ONDANSETRON 8 MG/1
4 TABLET, FILM COATED ORAL ONCE
Refills: 0 | Status: DISCONTINUED | OUTPATIENT
Start: 2021-10-21 | End: 2021-10-21

## 2021-10-21 RX ADMIN — HYDROMORPHONE HYDROCHLORIDE 0.5 MILLIGRAM(S): 2 INJECTION INTRAMUSCULAR; INTRAVENOUS; SUBCUTANEOUS at 13:16

## 2021-10-21 RX ADMIN — SODIUM CHLORIDE 75 MILLILITER(S): 9 INJECTION, SOLUTION INTRAVENOUS at 08:54

## 2021-10-21 RX ADMIN — Medication 400 MILLIGRAM(S): at 12:55

## 2021-10-21 RX ADMIN — Medication 1000 MILLIGRAM(S): at 13:18

## 2021-10-21 RX ADMIN — SODIUM CHLORIDE 75 MILLILITER(S): 9 INJECTION, SOLUTION INTRAVENOUS at 12:56

## 2021-10-21 NOTE — BRIEF OPERATIVE NOTE - NSICDXBRIEFPOSTOP_GEN_ALL_CORE_FT
POST-OP DIAGNOSIS:  Closed fracture of distal end of left forearm with malunion 21-Oct-2021 12:55:00  Morgan Grande

## 2021-10-21 NOTE — BRIEF OPERATIVE NOTE - NSICDXBRIEFPROCEDURE_GEN_ALL_CORE_FT
PROCEDURES:  ORIF, fracture, radius and ulna, distal, left 21-Oct-2021 12:49:57 distal radius ORIF for distal radius nonunion. Morgan Grande

## 2021-10-21 NOTE — ASU DISCHARGE PLAN (ADULT/PEDIATRIC) - ASU DC SPECIAL INSTRUCTIONSFT
left arm is non weight bearing. Keep in splint and sling. elevate to decrease pain and swelling. Keep splint clean and dry at all times    Medication  Percocet 5-325mg take 1 tablet ever 4-6 hours as needed for pain  Keflex 500mg take 1 tablet 4 times daily x 5 days

## 2021-10-21 NOTE — BRIEF OPERATIVE NOTE - NSICDXBRIEFPREOP_GEN_ALL_CORE_FT
PRE-OP DIAGNOSIS:  Closed fracture of distal end of left forearm with malunion 21-Oct-2021 12:50:53  Morgan Grande

## 2021-10-21 NOTE — ASU DISCHARGE PLAN (ADULT/PEDIATRIC) - CARE PROVIDER_API CALL
Sonny Veliz (MD)  Orthopaedic Surgery  [address] [place] [address]  Phone: [phone]  Fax: [phone]  Scheduled Appointment: 11/03/2021

## 2021-10-26 ENCOUNTER — INPATIENT (INPATIENT)
Facility: HOSPITAL | Age: 51
LOS: 2 days | Discharge: HOME | DRG: 917 | End: 2021-10-29
Attending: INTERNAL MEDICINE | Admitting: INTERNAL MEDICINE
Payer: COMMERCIAL

## 2021-10-26 VITALS — HEIGHT: 61 IN

## 2021-10-26 DIAGNOSIS — J96.01 ACUTE RESPIRATORY FAILURE WITH HYPOXIA: ICD-10-CM

## 2021-10-26 DIAGNOSIS — N80.9 ENDOMETRIOSIS, UNSPECIFIED: Chronic | ICD-10-CM

## 2021-10-26 DIAGNOSIS — Z90.49 ACQUIRED ABSENCE OF OTHER SPECIFIED PARTS OF DIGESTIVE TRACT: Chronic | ICD-10-CM

## 2021-10-26 DIAGNOSIS — T50.901A POISONING BY UNSPECIFIED DRUGS, MEDICAMENTS AND BIOLOGICAL SUBSTANCES, ACCIDENTAL (UNINTENTIONAL), INITIAL ENCOUNTER: ICD-10-CM

## 2021-10-26 DIAGNOSIS — Z29.9 ENCOUNTER FOR PROPHYLACTIC MEASURES, UNSPECIFIED: ICD-10-CM

## 2021-10-26 DIAGNOSIS — F32.9 MAJOR DEPRESSIVE DISORDER, SINGLE EPISODE, UNSPECIFIED: ICD-10-CM

## 2021-10-26 DIAGNOSIS — F41.9 ANXIETY DISORDER, UNSPECIFIED: ICD-10-CM

## 2021-10-26 DIAGNOSIS — Z98.890 OTHER SPECIFIED POSTPROCEDURAL STATES: Chronic | ICD-10-CM

## 2021-10-26 LAB
ALBUMIN SERPL ELPH-MCNC: 3.1 G/DL — LOW (ref 3.3–5)
ALP SERPL-CCNC: 52 U/L — SIGNIFICANT CHANGE UP (ref 40–120)
ALT FLD-CCNC: 25 U/L — SIGNIFICANT CHANGE UP (ref 12–78)
AMMONIA BLD-MCNC: <17 UMOL/L — SIGNIFICANT CHANGE UP (ref 11–32)
AMPHET UR-MCNC: NEGATIVE — SIGNIFICANT CHANGE UP
ANION GAP SERPL CALC-SCNC: 6 MMOL/L — SIGNIFICANT CHANGE UP (ref 5–17)
ANION GAP SERPL CALC-SCNC: 7 MMOL/L — SIGNIFICANT CHANGE UP (ref 5–17)
APPEARANCE UR: CLEAR — SIGNIFICANT CHANGE UP
APTT BLD: 32.7 SEC — SIGNIFICANT CHANGE UP (ref 27.5–35.5)
AST SERPL-CCNC: 18 U/L — SIGNIFICANT CHANGE UP (ref 15–37)
BACTERIA # UR AUTO: NEGATIVE — SIGNIFICANT CHANGE UP
BARBITURATES UR SCN-MCNC: NEGATIVE — SIGNIFICANT CHANGE UP
BASE EXCESS BLDA CALC-SCNC: 7.4 MMOL/L — HIGH (ref -2–3)
BASOPHILS # BLD AUTO: 0.04 K/UL — SIGNIFICANT CHANGE UP (ref 0–0.2)
BASOPHILS NFR BLD AUTO: 0.5 % — SIGNIFICANT CHANGE UP (ref 0–2)
BENZODIAZ UR-MCNC: POSITIVE — SIGNIFICANT CHANGE UP
BILIRUB SERPL-MCNC: <0.1 MG/DL — LOW (ref 0.2–1.2)
BILIRUB UR-MCNC: ABNORMAL
BLOOD GAS COMMENTS ARTERIAL: SIGNIFICANT CHANGE UP
BLOOD GAS COMMENTS ARTERIAL: SIGNIFICANT CHANGE UP
BUN SERPL-MCNC: 6 MG/DL — LOW (ref 7–23)
BUN SERPL-MCNC: 9 MG/DL — SIGNIFICANT CHANGE UP (ref 7–23)
CALCIUM SERPL-MCNC: 7.6 MG/DL — LOW (ref 8.5–10.1)
CALCIUM SERPL-MCNC: 8.6 MG/DL — SIGNIFICANT CHANGE UP (ref 8.5–10.1)
CHLORIDE SERPL-SCNC: 102 MMOL/L — SIGNIFICANT CHANGE UP (ref 96–108)
CHLORIDE SERPL-SCNC: 108 MMOL/L — SIGNIFICANT CHANGE UP (ref 96–108)
CK SERPL-CCNC: 71 U/L — SIGNIFICANT CHANGE UP (ref 26–192)
CO2 SERPL-SCNC: 29 MMOL/L — SIGNIFICANT CHANGE UP (ref 22–31)
CO2 SERPL-SCNC: 32 MMOL/L — HIGH (ref 22–31)
COCAINE METAB.OTHER UR-MCNC: NEGATIVE — SIGNIFICANT CHANGE UP
COLOR SPEC: YELLOW — SIGNIFICANT CHANGE UP
CREAT SERPL-MCNC: 1.7 MG/DL — HIGH (ref 0.5–1.3)
CREAT SERPL-MCNC: 2 MG/DL — HIGH (ref 0.5–1.3)
DIFF PNL FLD: NEGATIVE — SIGNIFICANT CHANGE UP
EOSINOPHIL # BLD AUTO: 0.19 K/UL — SIGNIFICANT CHANGE UP (ref 0–0.5)
EOSINOPHIL NFR BLD AUTO: 2.6 % — SIGNIFICANT CHANGE UP (ref 0–6)
EPI CELLS # UR: SIGNIFICANT CHANGE UP
ETHANOL SERPL-MCNC: 13 MG/DL — HIGH (ref 0–10)
GLUCOSE SERPL-MCNC: 123 MG/DL — HIGH (ref 70–99)
GLUCOSE SERPL-MCNC: 142 MG/DL — HIGH (ref 70–99)
GLUCOSE UR QL: NEGATIVE — SIGNIFICANT CHANGE UP
HCG SERPL-ACNC: 2 MIU/ML — SIGNIFICANT CHANGE UP
HCO3 BLDA-SCNC: 33 MMOL/L — HIGH (ref 21–28)
HCT VFR BLD CALC: 37.6 % — SIGNIFICANT CHANGE UP (ref 34.5–45)
HGB BLD-MCNC: 12.9 G/DL — SIGNIFICANT CHANGE UP (ref 11.5–15.5)
IMM GRANULOCYTES NFR BLD AUTO: 0.1 % — SIGNIFICANT CHANGE UP (ref 0–1.5)
INR BLD: 0.96 RATIO — SIGNIFICANT CHANGE UP (ref 0.88–1.16)
KETONES UR-MCNC: ABNORMAL
LACTATE SERPL-SCNC: 2.7 MMOL/L — HIGH (ref 0.7–2)
LACTATE SERPL-SCNC: 4.6 MMOL/L — CRITICAL HIGH (ref 0.7–2)
LEUKOCYTE ESTERASE UR-ACNC: NEGATIVE — SIGNIFICANT CHANGE UP
LYMPHOCYTES # BLD AUTO: 3.14 K/UL — SIGNIFICANT CHANGE UP (ref 1–3.3)
LYMPHOCYTES # BLD AUTO: 42.9 % — SIGNIFICANT CHANGE UP (ref 13–44)
MAGNESIUM SERPL-MCNC: 1.4 MG/DL — LOW (ref 1.6–2.6)
MCHC RBC-ENTMCNC: 33.2 PG — SIGNIFICANT CHANGE UP (ref 27–34)
MCHC RBC-ENTMCNC: 34.3 GM/DL — SIGNIFICANT CHANGE UP (ref 32–36)
MCV RBC AUTO: 96.7 FL — SIGNIFICANT CHANGE UP (ref 80–100)
METHADONE UR-MCNC: NEGATIVE — SIGNIFICANT CHANGE UP
MONOCYTES # BLD AUTO: 0.77 K/UL — SIGNIFICANT CHANGE UP (ref 0–0.9)
MONOCYTES NFR BLD AUTO: 10.5 % — SIGNIFICANT CHANGE UP (ref 2–14)
NEUTROPHILS # BLD AUTO: 3.17 K/UL — SIGNIFICANT CHANGE UP (ref 1.8–7.4)
NEUTROPHILS NFR BLD AUTO: 43.4 % — SIGNIFICANT CHANGE UP (ref 43–77)
NITRITE UR-MCNC: NEGATIVE — SIGNIFICANT CHANGE UP
NRBC # BLD: 0 /100 WBCS — SIGNIFICANT CHANGE UP (ref 0–0)
OPIATES UR-MCNC: POSITIVE — SIGNIFICANT CHANGE UP
PCO2 BLDA: 58 MMHG — HIGH (ref 32–35)
PCP SPEC-MCNC: SIGNIFICANT CHANGE UP
PCP UR-MCNC: NEGATIVE — SIGNIFICANT CHANGE UP
PH BLDA: 7.36 — SIGNIFICANT CHANGE UP (ref 7.35–7.45)
PH UR: 6 — SIGNIFICANT CHANGE UP (ref 5–8)
PHOSPHATE SERPL-MCNC: 4.7 MG/DL — HIGH (ref 2.5–4.5)
PLATELET # BLD AUTO: 258 K/UL — SIGNIFICANT CHANGE UP (ref 150–400)
PO2 BLDA: 173 MMHG — HIGH (ref 83–108)
POTASSIUM SERPL-MCNC: 3.6 MMOL/L — SIGNIFICANT CHANGE UP (ref 3.5–5.3)
POTASSIUM SERPL-MCNC: 4.1 MMOL/L — SIGNIFICANT CHANGE UP (ref 3.5–5.3)
POTASSIUM SERPL-SCNC: 3.6 MMOL/L — SIGNIFICANT CHANGE UP (ref 3.5–5.3)
POTASSIUM SERPL-SCNC: 4.1 MMOL/L — SIGNIFICANT CHANGE UP (ref 3.5–5.3)
PROT SERPL-MCNC: 6.6 G/DL — SIGNIFICANT CHANGE UP (ref 6–8.3)
PROT UR-MCNC: 15
PROTHROM AB SERPL-ACNC: 11.2 SEC — SIGNIFICANT CHANGE UP (ref 10.6–13.6)
RBC # BLD: 3.89 M/UL — SIGNIFICANT CHANGE UP (ref 3.8–5.2)
RBC # FLD: 11.5 % — SIGNIFICANT CHANGE UP (ref 10.3–14.5)
RBC CASTS # UR COMP ASSIST: NEGATIVE /HPF — SIGNIFICANT CHANGE UP (ref 0–4)
SAO2 % BLDA: 100 % — HIGH (ref 94–98)
SARS-COV-2 RNA SPEC QL NAA+PROBE: SIGNIFICANT CHANGE UP
SODIUM SERPL-SCNC: 141 MMOL/L — SIGNIFICANT CHANGE UP (ref 135–145)
SODIUM SERPL-SCNC: 143 MMOL/L — SIGNIFICANT CHANGE UP (ref 135–145)
SP GR SPEC: 1.01 — SIGNIFICANT CHANGE UP (ref 1.01–1.02)
THC UR QL: NEGATIVE — SIGNIFICANT CHANGE UP
TROPONIN I, HIGH SENSITIVITY RESULT: 11 NG/L — SIGNIFICANT CHANGE UP
TROPONIN I, HIGH SENSITIVITY RESULT: 4.6 NG/L — SIGNIFICANT CHANGE UP
UROBILINOGEN FLD QL: NEGATIVE — SIGNIFICANT CHANGE UP
WBC # BLD: 7.32 K/UL — SIGNIFICANT CHANGE UP (ref 3.8–10.5)
WBC # FLD AUTO: 7.32 K/UL — SIGNIFICANT CHANGE UP (ref 3.8–10.5)
WBC UR QL: SIGNIFICANT CHANGE UP

## 2021-10-26 PROCEDURE — 99291 CRITICAL CARE FIRST HOUR: CPT

## 2021-10-26 PROCEDURE — 70450 CT HEAD/BRAIN W/O DYE: CPT | Mod: 26,59,MA

## 2021-10-26 PROCEDURE — 70498 CT ANGIOGRAPHY NECK: CPT | Mod: 26,MA

## 2021-10-26 PROCEDURE — 70496 CT ANGIOGRAPHY HEAD: CPT | Mod: 26,MA

## 2021-10-26 PROCEDURE — 93010 ELECTROCARDIOGRAM REPORT: CPT

## 2021-10-26 PROCEDURE — 71045 X-RAY EXAM CHEST 1 VIEW: CPT | Mod: 26

## 2021-10-26 PROCEDURE — 99223 1ST HOSP IP/OBS HIGH 75: CPT | Mod: GC

## 2021-10-26 PROCEDURE — 0042T: CPT

## 2021-10-26 RX ORDER — SODIUM CHLORIDE 9 MG/ML
1000 INJECTION, SOLUTION INTRAVENOUS ONCE
Refills: 0 | Status: COMPLETED | OUTPATIENT
Start: 2021-10-26 | End: 2021-10-26

## 2021-10-26 RX ORDER — KETAMINE HYDROCHLORIDE 100 MG/ML
38 INJECTION INTRAMUSCULAR; INTRAVENOUS ONCE
Refills: 0 | Status: DISCONTINUED | OUTPATIENT
Start: 2021-10-26 | End: 2021-10-26

## 2021-10-26 RX ORDER — DIAZEPAM 5 MG
0 TABLET ORAL
Qty: 0 | Refills: 0 | DISCHARGE

## 2021-10-26 RX ORDER — SODIUM CHLORIDE 9 MG/ML
500 INJECTION, SOLUTION INTRAVENOUS ONCE
Refills: 0 | Status: COMPLETED | OUTPATIENT
Start: 2021-10-26 | End: 2021-10-26

## 2021-10-26 RX ORDER — KETAMINE HYDROCHLORIDE 100 MG/ML
1 INJECTION INTRAMUSCULAR; INTRAVENOUS
Qty: 1000 | Refills: 0 | Status: DISCONTINUED | OUTPATIENT
Start: 2021-10-26 | End: 2021-10-27

## 2021-10-26 RX ORDER — DIAZEPAM 5 MG
2 TABLET ORAL
Qty: 0 | Refills: 0 | DISCHARGE

## 2021-10-26 RX ORDER — PIPERACILLIN AND TAZOBACTAM 4; .5 G/20ML; G/20ML
3.38 INJECTION, POWDER, LYOPHILIZED, FOR SOLUTION INTRAVENOUS ONCE
Refills: 0 | Status: COMPLETED | OUTPATIENT
Start: 2021-10-26 | End: 2021-10-26

## 2021-10-26 RX ORDER — PANTOPRAZOLE SODIUM 20 MG/1
40 TABLET, DELAYED RELEASE ORAL DAILY
Refills: 0 | Status: DISCONTINUED | OUTPATIENT
Start: 2021-10-26 | End: 2021-10-26

## 2021-10-26 RX ORDER — CETIRIZINE HYDROCHLORIDE 10 MG/1
0 TABLET ORAL
Qty: 0 | Refills: 0 | DISCHARGE

## 2021-10-26 RX ORDER — FERROUS GLUCONATE 100 %
0 POWDER (GRAM) MISCELLANEOUS
Qty: 0 | Refills: 0 | DISCHARGE

## 2021-10-26 RX ORDER — CHOLECALCIFEROL (VITAMIN D3) 125 MCG
0 CAPSULE ORAL
Qty: 0 | Refills: 0 | DISCHARGE

## 2021-10-26 RX ORDER — CHLORHEXIDINE GLUCONATE 213 G/1000ML
1 SOLUTION TOPICAL
Refills: 0 | Status: DISCONTINUED | OUTPATIENT
Start: 2021-10-26 | End: 2021-10-29

## 2021-10-26 RX ORDER — SUCCINYLCHOLINE CHLORIDE 100 MG/5ML
50 SYRINGE (ML) INTRAVENOUS ONCE
Refills: 0 | Status: COMPLETED | OUTPATIENT
Start: 2021-10-26 | End: 2021-10-26

## 2021-10-26 RX ORDER — ZOLPIDEM TARTRATE 10 MG/1
12.5 TABLET ORAL
Qty: 0 | Refills: 0 | DISCHARGE

## 2021-10-26 RX ORDER — NOREPINEPHRINE BITARTRATE/D5W 8 MG/250ML
0.05 PLASTIC BAG, INJECTION (ML) INTRAVENOUS
Qty: 8 | Refills: 0 | Status: DISCONTINUED | OUTPATIENT
Start: 2021-10-26 | End: 2021-10-26

## 2021-10-26 RX ORDER — ETOMIDATE 2 MG/ML
10 INJECTION INTRAVENOUS ONCE
Refills: 0 | Status: COMPLETED | OUTPATIENT
Start: 2021-10-26 | End: 2021-10-26

## 2021-10-26 RX ORDER — HEPARIN SODIUM 5000 [USP'U]/ML
5000 INJECTION INTRAVENOUS; SUBCUTANEOUS EVERY 12 HOURS
Refills: 0 | Status: DISCONTINUED | OUTPATIENT
Start: 2021-10-26 | End: 2021-10-29

## 2021-10-26 RX ORDER — KETAMINE HYDROCHLORIDE 100 MG/ML
1 INJECTION INTRAMUSCULAR; INTRAVENOUS
Qty: 1000 | Refills: 0 | Status: DISCONTINUED | OUTPATIENT
Start: 2021-10-26 | End: 2021-10-26

## 2021-10-26 RX ORDER — CEPHALEXIN 500 MG
1 CAPSULE ORAL
Qty: 0 | Refills: 0 | DISCHARGE

## 2021-10-26 RX ORDER — SODIUM CHLORIDE 9 MG/ML
1000 INJECTION INTRAMUSCULAR; INTRAVENOUS; SUBCUTANEOUS ONCE
Refills: 0 | Status: COMPLETED | OUTPATIENT
Start: 2021-10-26 | End: 2021-10-26

## 2021-10-26 RX ORDER — CHLORHEXIDINE GLUCONATE 213 G/1000ML
15 SOLUTION TOPICAL EVERY 12 HOURS
Refills: 0 | Status: DISCONTINUED | OUTPATIENT
Start: 2021-10-26 | End: 2021-10-28

## 2021-10-26 RX ORDER — ESOMEPRAZOLE MAGNESIUM 40 MG/1
1 CAPSULE, DELAYED RELEASE ORAL
Qty: 0 | Refills: 0 | DISCHARGE

## 2021-10-26 RX ORDER — SODIUM CHLORIDE 9 MG/ML
1150 INJECTION INTRAMUSCULAR; INTRAVENOUS; SUBCUTANEOUS ONCE
Refills: 0 | Status: DISCONTINUED | OUTPATIENT
Start: 2021-10-26 | End: 2021-10-27

## 2021-10-26 RX ADMIN — CHLORHEXIDINE GLUCONATE 15 MILLILITER(S): 213 SOLUTION TOPICAL at 17:14

## 2021-10-26 RX ADMIN — ETOMIDATE 10 MILLIGRAM(S): 2 INJECTION INTRAVENOUS at 14:49

## 2021-10-26 RX ADMIN — PIPERACILLIN AND TAZOBACTAM 200 GRAM(S): 4; .5 INJECTION, POWDER, LYOPHILIZED, FOR SOLUTION INTRAVENOUS at 17:13

## 2021-10-26 RX ADMIN — SODIUM CHLORIDE 500 MILLILITER(S): 9 INJECTION, SOLUTION INTRAVENOUS at 18:11

## 2021-10-26 RX ADMIN — Medication 50 MILLIGRAM(S): at 14:50

## 2021-10-26 RX ADMIN — SODIUM CHLORIDE 500 MILLILITER(S): 9 INJECTION, SOLUTION INTRAVENOUS at 17:18

## 2021-10-26 RX ADMIN — SODIUM CHLORIDE 1000 MILLILITER(S): 9 INJECTION INTRAMUSCULAR; INTRAVENOUS; SUBCUTANEOUS at 14:49

## 2021-10-26 RX ADMIN — KETAMINE HYDROCHLORIDE 3.8 MG/KG/HR: 100 INJECTION INTRAMUSCULAR; INTRAVENOUS at 16:03

## 2021-10-26 RX ADMIN — SODIUM CHLORIDE 1000 MILLILITER(S): 9 INJECTION, SOLUTION INTRAVENOUS at 15:30

## 2021-10-26 RX ADMIN — HEPARIN SODIUM 5000 UNIT(S): 5000 INJECTION INTRAVENOUS; SUBCUTANEOUS at 17:14

## 2021-10-26 NOTE — ED PROVIDER NOTE - CLINICAL SUMMARY MEDICAL DECISION MAKING FREE TEXT BOX
Pt is a 50 yo f who underwent an orif by dr Veliz 5 days ago at this hosptial. the recovery has been uneventful she completed her course of abx and has been taking her usual medications for depression anxiety and anorexia, plus the occasional pain medicine the  has been holding and giving. at noon pt was in pain he gave her the pain med and by 1 pm he went to check back on her she was unable to awaken, he did a home pulse ox found the sat to be 70's and called 911.  EMS arrived pt was assisted with a bvm, opa given 4 mg narcan iv/in with no change. she remained sinus tachy and snoring.  no reports of trauma no hx of si or hi per .   is unsure if her symptoms are due to her use of ambien, or valium in addition to the narcotic pain meds  pmd is [facility] of [place] (sanjeev garcia, etc)  consult with icu ortho admit ct cta ro stroke bleed ro other toxidrome  sedate with ketamine infusion and push to keep rass -2 to -3  monitor airway iv   abg to assess vent settings

## 2021-10-26 NOTE — H&P ADULT - ATTENDING COMMENTS
50yo Female w/ PMHx of anxiety, depression, anorexia presented to the ED in an obtunded state from unknown source found by , admitted for toxic metabolic encephalopathy and Acute respiratory failure w/ hypoxia . Patient was intubated in the ED and transferred to the ICU for care. Plan: pt intubated, apprec ICU plan of care, monitor clinical course, neuro likely to be consulted, may need MR head, cosnider psych eval once awake and improved, f/u QTc on ekg

## 2021-10-26 NOTE — ED ADULT NURSE NOTE - NSIMPLEMENTINTERV_GEN_ALL_ED
Implemented All Fall with Harm Risk Interventions:  Ruth to call system. Call bell, personal items and telephone within reach. Instruct patient to call for assistance. Room bathroom lighting operational. Non-slip footwear when patient is off stretcher. Physically safe environment: no spills, clutter or unnecessary equipment. Stretcher in lowest position, wheels locked, appropriate side rails in place. Provide visual cue, wrist band, yellow gown, etc. Monitor gait and stability. Monitor for mental status changes and reorient to person, place, and time. Review medications for side effects contributing to fall risk. Reinforce activity limits and safety measures with patient and family. Provide visual clues: red socks.

## 2021-10-26 NOTE — CONSULT NOTE ADULT - ATTENDING COMMENTS
resp failure is likely relative to hypercapnia. Cont on mechanical ventilatory support. Wean as tolerated.   EKG has nonspecific changes. check echo. trend trops.     Upon my evaluation, this patient is at high risk for imminent or life threatening deterioration due to resp failure and other active medical issues which require my direct attention, intervention, and personal management.  I have personally spent >30 minutes  of critical care time exclusive of time spent on separate billing procedures. This includes review of laboratory data, radiology results, discussion with primary team\patient, and monitoring for potential decompensation. Interventions were performed as documented above.

## 2021-10-26 NOTE — ED PROVIDER NOTE - CARE PLAN
1 Principal Discharge DX:	Acute respiratory failure with hypoxia   Principal Discharge DX:	Acute respiratory failure with hypoxia  Secondary Diagnosis:	SUSANA (acute kidney injury)  Secondary Diagnosis:	Acute dehydration

## 2021-10-26 NOTE — H&P ADULT - NSHPSOCIALHISTORY_GEN_ALL_CORE
Lives at home with , 2 children   No tobacco use, no recreational drug use   Drinks a martini whenever family eats out

## 2021-10-26 NOTE — ED PROVIDER NOTE - OBJECTIVE STATEMENT
d[facility] cell [phone]  Dakota Dakota= cell [phone]  Pt is a 50 yo f who underwent an orif by dr Veliz 5 days ago at this hosptial. the recovery has been uneventful she completed her course of abx and has been taking her usual medications for depression anxiety and anorexia, plus the occasional pain medicine the  has been holding and giving. at noon pt was in pain he gave her the pain med and by 1 pm he went to check back on her she was unable to awaken, he did a home pulse ox found the sat to be 70's and called 911.  EMS arrived pt was assisted with a bvm, opa given 4 mg narcan iv/in with no change. she remained sinus tachy and snoring.  no reports of trauma no hx of si or hi per .   is unsure if her symptoms are due to her use of ambien, or valium in addition to the narcotic pain meds  pmd is [facility] of [place] (radha, sanjeev, etc)

## 2021-10-26 NOTE — ED PROVIDER NOTE - CRITICAL CARE ATTENDING CONTRIBUTION TO CARE
Pt is a 52 yo f who underwent an orif by dr Veliz 5 days ago at this hosptial. the recovery has been uneventful she completed her course of abx and has been taking her usual medications for depression anxiety and anorexia, plus the occasional pain medicine the  has been holding and giving. at noon pt was in pain he gave her the pain med and by 1 pm he went to check back on her she was unable to awaken, he did a home pulse ox found the sat to be 70's and called 911.  EMS arrived pt was assisted with a bvm, opa given 4 mg narcan iv/in with no change. she remained sinus tachy and snoring.  no reports of trauma no hx of si or hi per .   is unsure if her symptoms are due to her use of ambien, or valium in addition to the narcotic pain meds  pmd is [facility] of [place] (sanjeev garcia, etc)  consult with icu ortho admit ct cta ro stroke bleed ro other toxidrome  sedate with ketamine infusion and push to keep rass -2 to -3  monitor airway iv   abg to assess vent settings

## 2021-10-26 NOTE — H&P ADULT - PROBLEM SELECTOR PLAN 1
Acute Respiratory failure 2/2 to drug overdose   - Patient on Percocet for pain for recent ORIF, also on Valium and Ambien at home for anxiety   - Intubated in ED, transferred to ICU   - Utox positive for benzos and opiates   - ABG pH 7.36/pCO2 58/HCO3 33/pO2 173/O2 sat 100%,  - Lactate 4.4   - Imaging negative for hemorrhage, stenosis, or perfusion abnormality   - Ventilator AC 16/350/5/35%, titrate to maintain SpO2 >92% and pH > 7.25  - As per ICU, f/u blood cx/urine cx/sputum cx, trend lactate, keep K>4, Mg>2 Acute Respiratory failure with hypoxia and toxic metabolic encephalopathy 2/2 to suspected drug overdose vs rubbing etoh ingestion vs idiopathic  - Patient on Percocet for pain for recent ORIF, also on Valium and Ambien at home for anxiety   - Intubated in ED, transferred to ICU   - Utox positive for benzos and opiates   - ABG pH 7.36/pCO2 58/HCO3 33/pO2 173/O2 sat 100%,  - Lactate 4.4   - Imaging negative for hemorrhage, stenosis, or perfusion abnormality   - Ventilator AC 16/350/5/35%, titrate to maintain SpO2 >92% and pH > 7.25  - As per ICU, f/u blood cx/urine cx/sputum cx, trend lactate, keep K>4, Mg>2

## 2021-10-26 NOTE — H&P ADULT - HISTORY OF PRESENT ILLNESS
50yo Female w/ PMHx of anxiety, depression, anorexia presented to the ED in an obtunded state, was intubated in ED and transferred to ICU. HPI provided by patient's  Dakota [phone] at bedside.  Patient had a Left distal radius ORIF performed by Dr. Veliz in [place] on Thursday 10/21/2021 and was started on percocet for pain control. Patient was already on valium and ambien for her anxiety. Patients  Dakota, was in charge of administering her percocet, states she would take 2 tabs q6H. Patient was still in control of self-administering her valium and ambien. As per , patient was given her percocet today at around 12:00, however patient seemed "off" to the  as she was slurring her words. Patient denied taking anything or feeling different when questioned by .  then went to take a shower and returned 45 minutes later to find patient unresponsive to verbal or physical stimuli. Patient's pulse ox was measured at home to be in the 70s, upon which patient's  immediately called 911. In ambulance patient was given 4mg of narcan with no change. In ICU, patient was sedated with ketamine and kept on ventilator.      In ED   VS: 97.4F , 107/59, RR 12, 98% w/ BVM --> 100% on ventilator   Labs signifcant for: Cr 1.7, Lactate 4.6, ABG pH 7.36/pCO2 58/HCO3 33/pO2 173/O2 sat 100%, Utox positive for benzos and opiates   Head CT: negative for acute hemorrhage  CT perfusion: no tissue at risk, no mismatch   CTA Neck: No significant stenosis 50yo Female w/ PMHx of anxiety, depression, anorexia presented to the ED in an obtunded state, was intubated in ED and transferred to ICU. HPI provided by patient's  Dakota [phone] at bedside.  Patient had a Left distal radius ORIF performed by Dr. Veliz in [place] on Thursday 10/21/2021 and was started on percocet for pain control. Patient was already on valium and ambien for her anxiety. Patients  Dakota, was in charge of administering her percocet, states she would take 2 tabs q6H. Patient was still in control of self-administering her valium and ambien. As per , patient was given her percocet today at around 12:00, however patient seemed "off" to the  as she was slurring her words. Patient denied taking anything or feeling different when questioned by .  then went to take a shower and returned 45 minutes later to find patient unresponsive to verbal or physical stimuli. Patient's pulse ox was measured at home to be in the 70s, upon which patient's  immediately called 911. In ambulance patient was given 4mg of narcan with no change. In ICU, patient was sedated with ketamine and kept on ventilator.      In ED   VS: 97.4F , 107/59, RR 12, 98% w/ BVM --> 100% on ventilator   Labs signifcant for: Cr 1.7, Lactate 4.6, ABG pH 7.36/pCO2 58/HCO3 33/pO2 173/O2 sat 100%, Utox positive for benzos and opiates   Head CT: negative for acute hemorrhage  CT perfusion: no tissue at risk, no mismatch   CTA Neck: No significant stenosis  S/p zosyn

## 2021-10-26 NOTE — ED PROVIDER NOTE - LAB INTERPRETATION
xray chest we tread: tube just above sommer no infiltrates or effusions  will increase tv on vent and keep oxygen and peep same

## 2021-10-26 NOTE — CONSULT NOTE ADULT - SUBJECTIVE AND OBJECTIVE BOX
Patient is a 51y old  Female who presents with a chief complaint of     BRIEF HOSPITAL COURSE:   51y F PMHx Anxiety, Depression, Anorexia, w/ a recent ORIF of the left distal radius by Dr. Veliz 5 days ago. Has been taking her usual medications for Depression/Anxiety/Anorexia, plus the occasional pain medicine the  has been holding and giving. This afternoon pt was in pain  gave her the pain medication and by 1 pm she was unable to be awoken, he did a home pulse ox found the sat to be 70's and called 911. EMS arrived pt was assisted with a BVM, given 4 mg narcan iv/in with no change.  is unsure if her symptoms are due to her use of Ambien, Valium in addition to the narcotic pain meds. Pt intubated for hypoxic respiratory failure. VAC 14 / 350 / +5 / 40%. Sedated w/ Ketamine gtt. Pt hypotensive despite   ICU consulted for further management.     PAST MEDICAL & SURGICAL HISTORY:  Anxiety  Endometriosis determined by laparoscopy  Strabismus  GERD (gastroesophageal reflux disease)  Diverticulitis  h/o  Osteoporosis  Lymphadenopathy  H/O alopecia  H/O heartburn  History of diverticulitis  Closed Colles&#x27; fracture of left radius with malunion, subsequent encounter  Endometriosis determined by laparoscopy  over 15 years ago  History of strabismus surgery  2x  S/P colon resection  2015  S/P appendectomy  2015    Review of Systems:  Due to pt being intubated/sedated, subjective information was not able to be obtained from the patient. History was obtained, to the extent possible, from review of the chart and collateral sources of information.     Medications:  piperacillin/tazobactam IVPB... 3.375 Gram(s) IV Intermittent once    norepinephrine Infusion 0.05 MICROgram(s)/kG/Min IV Continuous <Continuous>      ketamine Infusion. 1 mG/kG/Hr IV Continuous <Continuous>  ketamine Injectable 38 milliGRAM(s) IV Push Once              lactated ringers Bolus 1000 milliLiter(s) IV Bolus once  sodium chloride 0.9% Bolus 1150 milliLiter(s) IV Bolus once      chlorhexidine 0.12% Liquid 15 milliLiter(s) Oral Mucosa every 12 hours  chlorhexidine 2% Cloths 1 Application(s) Topical <User Schedule>  chlorhexidine 4% Liquid 1 Application(s) Topical <User Schedule>        Mode: AC/ CMV (Assist Control/ Continuous Mandatory Ventilation)  RR (machine): 14  TV (machine): 350  FiO2: 40  PEEP: 5  ITime: 1  MAP: 7  PIP: 17      ICU Vital Signs Last 24 Hrs  T(C): 36.3 (26 Oct 2021 14:00), Max: 36.3 (26 Oct 2021 14:00)  T(F): 97.4 (26 Oct 2021 14:00), Max: 97.4 (26 Oct 2021 14:00)  HR: 116 (26 Oct 2021 14:48) (112 - 134)  BP: 995/- (26 Oct 2021 14:30) (107/59 - 995/-)  BP(mean): --  ABP: --  ABP(mean): --  RR: 14 (26 Oct 2021 14:00) (12 - 14)  SpO2: 100% (26 Oct 2021 14:48) (98% - 100%)      ABG - ( 26 Oct 2021 14:42 )  pH, Arterial: 7.36  pH, Blood: x     /  pCO2: 58    /  pO2: 173   / HCO3: 33    / Base Excess: 7.4   /  SaO2: 100.0               I&O's Detail        LABS:                        12.9   7.32  )-----------( 258      ( 26 Oct 2021 14:04 )             37.6     10-26    141  |  102  |  9   ----------------------------<  142<H>  3.6   |  32<H>  |  1.70<H>    Ca    8.6      26 Oct 2021 14:04    TPro  6.6  /  Alb  3.1<L>  /  TBili  <0.1<L>  /  DBili  x   /  AST  18  /  ALT  25  /  AlkPhos  52  10-26          CAPILLARY BLOOD GLUCOSE      POCT Blood Glucose.: 127 mg/dL (26 Oct 2021 14:02)    PT/INR - ( 26 Oct 2021 14:04 )   PT: 11.2 sec;   INR: 0.96 ratio         PTT - ( 26 Oct 2021 14:04 )  PTT:32.7 sec    CULTURES:      Physical Examination:    General: No acute distress.  Alert, oriented, interactive, nonfocal    HEENT: Pupils equal, reactive to light.  Symmetric.    PULM: Clear to auscultation bilaterally, no significant sputum production    CVS: Regular rate and rhythm, no murmurs, rubs, or gallops    ABD: Soft, nondistended, nontender, normoactive bowel sounds, no masses    EXT: No edema, nontender    SKIN: Warm and well perfused, no rashes noted.    NEURO: A&Ox3, strength 5/5 all extremities, cranial nerves grossly intact, no focal deficits      RADIOLOGY: ***      CRITICAL CARE TIME SPENT: ***  Evaluating/treating patient, reviewing data/labs/imaging, discussing case with multidisciplinary team, discussing plan/goals of care with patient/family. Non-inclusive of procedure time.   Patient is a 51y old  Female who presents with a chief complaint of     BRIEF HOSPITAL COURSE:   51y F PMHx Anxiety, Depression, Anorexia, w/ a recent ORIF of the left distal radius by Dr. Veliz 5 days ago. Has been taking her usual medications for Depression/Anxiety/Anorexia, plus the occasional pain medicine the  has been holding and giving. This afternoon pt was in pain  gave her the pain medication and by 1 pm she was unable to be awoken, he did a home pulse ox found the sat to be 70's and called 911. EMS arrived pt was assisted with a BVM, given 4 mg narcan iv/in with no change.  is unsure if her symptoms are due to her use of Ambien, Valium in addition to the narcotic pain meds. Pt intubated for hypoxic respiratory failure. VAC 14 / 350 / +5 / 40%. Sedated w/ Ketamine gtt. Pt hypotensive despite IVF resuscitation of +3L, started on Levo gtt.   CTA head/neck negative. UTox only positive for Benzo, Opioid. Alcohol level 13. T recal 94.8. Lactate 4.6. CXR w/ possible R sided PNA.   ICU consulted for further management.     PAST MEDICAL & SURGICAL HISTORY:  Anxiety  Endometriosis determined by laparoscopy  Strabismus  GERD (gastroesophageal reflux disease)  Diverticulitis  h/o  Osteoporosis  Lymphadenopathy  H/O alopecia  H/O heartburn  History of diverticulitis  Closed Colles&#x27; fracture of left radius with malunion, subsequent encounter  Endometriosis determined by laparoscopy  over 15 years ago  History of strabismus surgery  2x  S/P colon resection  2015  S/P appendectomy  2015    Review of Systems:  Due to pt being intubated/sedated, subjective information was not able to be obtained from the patient. History was obtained, to the extent possible, from review of the chart and collateral sources of information.     Medications:  piperacillin/tazobactam IVPB... 3.375 Gram(s) IV Intermittent once  norepinephrine Infusion 0.05 MICROgram(s)/kG/Min IV Continuous <Continuous>  ketamine Infusion. 1 mG/kG/Hr IV Continuous <Continuous>  ketamine Injectable 38 milliGRAM(s) IV Push Once  lactated ringers Bolus 1000 milliLiter(s) IV Bolus once  sodium chloride 0.9% Bolus 1150 milliLiter(s) IV Bolus once  chlorhexidine 0.12% Liquid 15 milliLiter(s) Oral Mucosa every 12 hours  chlorhexidine 2% Cloths 1 Application(s) Topical <User Schedule>  chlorhexidine 4% Liquid 1 Application(s) Topical <User Schedule>    Mode: AC/ CMV (Assist Control/ Continuous Mandatory Ventilation)  RR (machine): 14  TV (machine): 350  FiO2: 40  PEEP: 5  ITime: 1  MAP: 7  PIP: 17    ICU Vital Signs Last 24 Hrs  T(C): 36.3 (26 Oct 2021 14:00), Max: 36.3 (26 Oct 2021 14:00)  T(F): 97.4 (26 Oct 2021 14:00), Max: 97.4 (26 Oct 2021 14:00)  HR: 116 (26 Oct 2021 14:48) (112 - 134)  BP: 995/- (26 Oct 2021 14:30) (107/59 - 995/-)  BP(mean): --  ABP: --  ABP(mean): --  RR: 14 (26 Oct 2021 14:00) (12 - 14)  SpO2: 100% (26 Oct 2021 14:48) (98% - 100%)    ABG - ( 26 Oct 2021 14:42 )  pH, Arterial: 7.36  pH, Blood: x     /  pCO2: 58    /  pO2: 173   / HCO3: 33    / Base Excess: 7.4   /  SaO2: 100.0     I&O's Detail    LABS:             12.9   7.32  )-----------( 258      ( 26 Oct 2021 14:04 )             37.6     10-26    141  |  102  |  9   ----------------------------<  142<H>  3.6   |  32<H>  |  1.70<H>    Ca    8.6      26 Oct 2021 14:04    TPro  6.6  /  Alb  3.1<L>  /  TBili  <0.1<L>  /  DBili  x   /  AST  18  /  ALT  25  /  AlkPhos  52  10-26    CAPILLARY BLOOD GLUCOSE  POCT Blood Glucose.: 127 mg/dL (26 Oct 2021 14:02)    PT/INR - ( 26 Oct 2021 14:04 )   PT: 11.2 sec;   INR: 0.96 ratio    PTT - ( 26 Oct 2021 14:04 )  PTT:32.7 sec    CULTURES:    Physical Examination:  General: +Intubated/sedated.  HEENT: Pinpoint pupils b/l.   PULM: Clear to auscultation bilaterally.  CVS: s1/s2.  ABD: Soft, nondistended, nontender, normoactive bowel sounds.  EXT: No edema, nontender  SKIN: Warm.  NEURO: +Gag reflex. Withdraws to noxious stimuli.     RADIOLOGY:   < from: CT Angio Head w/ IV Cont (10.26.21 @ 14:38) >  EXAM:  CT PERFUSION W MAPS IC                        EXAM:  CT ANGIO BRAIN (W)AW IC                        EXAM:  CT BRAIN STROKE PROTOCOL                        EXAM:  CT ANGIO NECK (W)AW IC                        PROCEDURE DATE:  10/26/2021    IMPRESSION:  HEAD CT AND RAPID PERFUSION:  HEAD CT: Mild volume loss, microvascular disease, no acute hemorrhage or midline shift.  CT PERFUSION demonstrated:  INFARCT CORE: CBF <30%: 0 mL, TISSUE AT RISK:  Tmax > 6 s: 0 ml, MISMATCH RATIO: none  If symptoms persist consider follow up head CT or MRI, MRA  if no contraindication.  CTA COW:  Patent intracranial circulation without flow limiting stenosis  CTA NECK: Patent, CCAs, ECAs, ICAs, no  hemodynamically significant stenosis at  ICA origins by NASCET criteria.  Bilateral vertebral arteries are patent without flow limiting stenosis.  Biapical scarring with slight patchy opacification, please see dedicated report of the chest for chest findings.      51y F PMHx Anxiety, Depression, Anorexia, w/ a recent ORIF of the left distal radius by Dr. Veliz 5 days ago. Has been taking her usual medications for Depression/Anxiety/Anorexia, plus the occasional pain medicine the  has been holding and giving. This afternoon pt was in pain  gave her the pain medication and by 1 pm she was unable to be awoken, he did a home pulse ox found the sat to be 70's and called 911. EMS arrived pt was assisted with a BVM, given 4 mg narcan iv/in with no change.  is unsure if her symptoms are due to her use of Ambien, Valium in addition to the narcotic pain meds. Pt intubated for hypoxic respiratory failure. VAC 14 / 350 / +5 / 40%. Sedated w/ Ketamine gtt. Pt hypotensive despite IVF resuscitation of +3L, started on Levo gtt.   CTA head/neck negative. UTox only positive for Benzo, Opioid. Alcohol level 13. T recal 94.8. ABG pH 7.36,  pCO2: 58, pO2: 173, HCO3: 33. Lactate 4.6. CXR w/ possible R sided PNA.   ICU consulted for further management.   Assessment:  1. Acute Hypoxic Respiratory Failure, Acute on Chronic Hypercarbic Respiratory Failure   2. Probable Polysubstance Overdose  3. Septic Shock  4. ?Aspiration PNA  5. SUSANA    Plan:  NEURO:  -Sedated w/ Ketamine gtt. Goal RASS 0 to -1.    CV:  -Hypotensive despite IVF resuscitation. Levo gtt started, actively titrating for goal MAP >65-70.  -Troponin negative. Trend Troponin/EKGs. Cardiology consult.   -Keep K~4 and Mg>2 for optimal arrhythmia suppression.     RESP:  -Patient currently on Full vent support, VAC 14 / 350 / +5 / 40%  -titrate settings to maintain SaO2 >90%, or pH >7.25  -consider low tidal volume ventilation strategy w/ goal Tv 4-6 cc/kg of ideal body weight  -plateu pressure goal <30  -Peridex oral care  -aggressive pulmonary toilet  -daily sedation vacation with spontaneous breathing trial if clinical condition warrants, discuss with respiratory therapy     RENAL:  -Renal function currently w/ SUSANA (baseline on 10/15 0.71)  -trend lytes/Scr daily with BMP  -I's and O's, goal UOP 0.5 cc/kg/hr  -renal dose meds and avoid nephrotoxins     GI:  -NPO.  -Protonix IVP QD.     ENDO:  -Aggressive glycemic control to limit FS glucose to <180mg/dl.     ID:  -    HEME:  DISPO:  Patient is a 51y old  Female who presents with a chief complaint of     BRIEF HOSPITAL COURSE:   51y F PMHx Anxiety, Depression, Anorexia, w/ a recent ORIF of the left distal radius by Dr. Veliz 5 days ago. Has been taking her usual medications for Depression/Anxiety/Anorexia, plus the occasional pain medicine the  has been holding and giving. This afternoon pt was in pain  gave her the pain medication and by 1 pm she was unable to be awoken, he did a home pulse ox found the sat to be 70's and called 911. EMS arrived pt was assisted with a BVM, given 4mg Narcan iv/in with no change.  is unsure if her symptoms are due to her use of Ambien, Valium in addition to the narcotic pain meds. Pt intubated for hypoxic respiratory failure, airway protection. VAC 14 / 350 / +5 / 40%. Sedated w/ Ketamine gtt.   CTA head/neck negative. UTox only positive for Benzo, Opioid. Alcohol level 13. T recal 94.8. Lactate 4.6.    ICU consulted for further management.     PAST MEDICAL & SURGICAL HISTORY:  Anxiety  Endometriosis determined by laparoscopy  Strabismus  GERD (gastroesophageal reflux disease)  Diverticulitis  h/o  Osteoporosis  Lymphadenopathy  H/O alopecia  H/O heartburn  History of diverticulitis  Closed Colles&#x27; fracture of left radius with malunion, subsequent encounter  Endometriosis determined by laparoscopy  over 15 years ago  History of strabismus surgery  2x  S/P colon resection  2015  S/P appendectomy  2015    Review of Systems:  Due to pt being intubated/sedated, subjective information was not able to be obtained from the patient. History was obtained, to the extent possible, from review of the chart and collateral sources of information.     Medications:  piperacillin/tazobactam IVPB... 3.375 Gram(s) IV Intermittent once  norepinephrine Infusion 0.05 MICROgram(s)/kG/Min IV Continuous <Continuous>  ketamine Infusion. 1 mG/kG/Hr IV Continuous <Continuous>  ketamine Injectable 38 milliGRAM(s) IV Push Once  lactated ringers Bolus 1000 milliLiter(s) IV Bolus once  sodium chloride 0.9% Bolus 1150 milliLiter(s) IV Bolus once  chlorhexidine 0.12% Liquid 15 milliLiter(s) Oral Mucosa every 12 hours  chlorhexidine 2% Cloths 1 Application(s) Topical <User Schedule>  chlorhexidine 4% Liquid 1 Application(s) Topical <User Schedule>    Mode: AC/ CMV (Assist Control/ Continuous Mandatory Ventilation)  RR (machine): 14  TV (machine): 350  FiO2: 40  PEEP: 5  ITime: 1  MAP: 7  PIP: 17    ICU Vital Signs Last 24 Hrs  T(C): 36.3 (26 Oct 2021 14:00), Max: 36.3 (26 Oct 2021 14:00)  T(F): 97.4 (26 Oct 2021 14:00), Max: 97.4 (26 Oct 2021 14:00)  HR: 116 (26 Oct 2021 14:48) (112 - 134)  BP: 995/- (26 Oct 2021 14:30) (107/59 - 995/-)  BP(mean): --  ABP: --  ABP(mean): --  RR: 14 (26 Oct 2021 14:00) (12 - 14)  SpO2: 100% (26 Oct 2021 14:48) (98% - 100%)    ABG - ( 26 Oct 2021 14:42 )  pH, Arterial: 7.36  pH, Blood: x     /  pCO2: 58    /  pO2: 173   / HCO3: 33    / Base Excess: 7.4   /  SaO2: 100.0     I&O's Detail    LABS:             12.9   7.32  )-----------( 258      ( 26 Oct 2021 14:04 )             37.6     10-26    141  |  102  |  9   ----------------------------<  142<H>  3.6   |  32<H>  |  1.70<H>    Ca    8.6      26 Oct 2021 14:04    TPro  6.6  /  Alb  3.1<L>  /  TBili  <0.1<L>  /  DBili  x   /  AST  18  /  ALT  25  /  AlkPhos  52  10-26    CAPILLARY BLOOD GLUCOSE  POCT Blood Glucose.: 127 mg/dL (26 Oct 2021 14:02)    PT/INR - ( 26 Oct 2021 14:04 )   PT: 11.2 sec;   INR: 0.96 ratio    PTT - ( 26 Oct 2021 14:04 )  PTT:32.7 sec    CULTURES:    Physical Examination:  General: +Intubated/sedated.  HEENT: Pinpoint pupils b/l.   PULM: Clear to auscultation bilaterally.  CVS: s1/s2.  ABD: Soft, nondistended, nontender, normoactive bowel sounds.  EXT: No edema, nontender  SKIN: Warm.  NEURO: +Gag reflex. Withdraws to noxious stimuli.     RADIOLOGY:   < from: CT Angio Head w/ IV Cont (10.26.21 @ 14:38) >  EXAM:  CT PERFUSION W MAPS IC                        EXAM:  CT ANGIO BRAIN (W)AW IC                        EXAM:  CT BRAIN STROKE PROTOCOL                        EXAM:  CT ANGIO NECK (W)AW IC                        PROCEDURE DATE:  10/26/2021    IMPRESSION:  HEAD CT AND RAPID PERFUSION:  HEAD CT: Mild volume loss, microvascular disease, no acute hemorrhage or midline shift.  CT PERFUSION demonstrated:  INFARCT CORE: CBF <30%: 0 mL, TISSUE AT RISK:  Tmax > 6 s: 0 ml, MISMATCH RATIO: none  If symptoms persist consider follow up head CT or MRI, MRA  if no contraindication.  CTA COW:  Patent intracranial circulation without flow limiting stenosis  CTA NECK: Patent, CCAs, ECAs, ICAs, no  hemodynamically significant stenosis at  ICA origins by NASCET criteria.  Bilateral vertebral arteries are patent without flow limiting stenosis.  Biapical scarring with slight patchy opacification, please see dedicated report of the chest for chest findings.      51y F PMHx Anxiety, Depression, Anorexia, w/ a recent ORIF of the left distal radius by Dr. Veliz 5 days ago. Has been taking her usual medications for Depression/Anxiety/Anorexia, plus the occasional pain medicine the  has been holding and giving. This afternoon pt was in pain  gave her the pain medication and by 1 pm she was unable to be awoken, he did a home pulse ox found the sat to be 70's and called 911. EMS arrived pt was assisted with a BVM, given 4 mg narcan iv/in with no change.  is unsure if her symptoms are due to her use of Ambien, Valium in addition to the narcotic pain meds. Pt intubated for hypoxic respiratory failure, airway protection. VAC 14 / 350 / +5 / 40%. Sedated w/ Ketamine gtt.   CTA head/neck negative. UTox only positive for Benzo, Opioid. Alcohol level 13. T recal 94.8. ABG pH 7.36,  pCO2: 58, pO2: 173, HCO3: 33. Lactate 4.6.   ICU consulted for further management.   Assessment:  1. Acute Hypoxic / Hypercarbic Respiratory Failure   2. Polysubstance Overdose  3. SUSANA    Plan:  NEURO:  -Sedated w/ Ketamine gtt. Goal RASS 0 to -1.    CV:  -Maintain goal MAP >65-70.  -Troponin negative. Trend Troponin/EKGs. Cardiology consult.   -Keep K~4 and Mg>2 for optimal arrhythmia suppression.     RESP:  -Patient currently on Full vent support, VAC 14 / 350 / +5 / 40%  -titrate settings to maintain SaO2 >90%, or pH >7.25  -consider low tidal volume ventilation strategy w/ goal Tv 4-6 cc/kg of ideal body weight  -plateau pressure goal <30  -Peridex oral care  -aggressive pulmonary toilet  -daily sedation vacation with spontaneous breathing trial if clinical condition warrants, discuss with respiratory therapy     RENAL:  -Renal function currently w/ SUSANA (baseline on 10/15 0.71)  -trend lytes/Scr daily with BMP  -I's and O's, goal UOP 0.5 cc/kg/hr  -renal dose meds and avoid nephrotoxins     GI:  -NPO.    ENDO:  -Aggressive glycemic control to limit FS glucose to <180mg/dl. BS WNL.     ID:  -Hypothermic, no leukocytosis. Elevated lactate. Given +3L IVF boluses, x1 dose of Zosyn in ED. BloodCx/UrineCx sent. SputumCx ordered. Will monitor off abx. Will give additional IVF bolus now, trend lactate.     HEME:  -DVT ppx w/ SC Heparin.     DISPO: D/w ICU intensivist Dr. Hidalgo - will admit to MICU.     CRITICAL CARE TIME SPENT:  45 minutes of critical care time spent providing medical care for patient's acute illness/conditions that impairs at least one vital organ system and/or poses a high risk of imminent or life threatening deterioration in the patient's condition. It includes time spent evaluating and treating the patient's acute illness as well as time spent reviewing labs, radiology, discussing goals of care with patient and/or patient's family, and discussing the case with a multidisciplinary team, including the eICU, in an effort to prevent further life threatening deterioration or end organ damage. This time is independent of any procedures performed.  Patient is a 51y old  Female who presents with a chief complaint of     BRIEF HOSPITAL COURSE:   51y F PMHx Anxiety, Depression, Anorexia, w/ a recent ORIF of the left distal radius by Dr. Veliz 5 days ago. Has been taking her usual medications for Depression/Anxiety/Anorexia, plus the occasional pain medicine the  has been holding and giving. This afternoon pt was in pain  gave her the pain medication and by 1 pm she was unable to be awoken, he did a home pulse ox found the sat to be 70's and called 911. EMS arrived pt was assisted with a BVM, given 4mg Narcan iv/in with no change.  is unsure if her symptoms are due to her use of Ambien, Valium in addition to the narcotic pain meds. Pt intubated for hypoxic respiratory failure, airway protection. VAC 14 / 350 / +5 / 40%. Sedated w/ Ketamine gtt.   CTA head/neck negative. UTox only positive for Benzo, Opioid. Alcohol level 13. T recal 94.8. Lactate 4.6.    ICU consulted for further management.     PAST MEDICAL & SURGICAL HISTORY:  Anxiety  Endometriosis determined by laparoscopy  Strabismus  GERD (gastroesophageal reflux disease)  Diverticulitis  h/o  Osteoporosis  Lymphadenopathy  H/O alopecia  H/O heartburn  History of diverticulitis  Closed Colles&#x27; fracture of left radius with malunion, subsequent encounter  Endometriosis determined by laparoscopy  over 15 years ago  History of strabismus surgery  2x  S/P colon resection  2015  S/P appendectomy  2015    Review of Systems:  Due to pt being intubated/sedated, subjective information was not able to be obtained from the patient. History was obtained, to the extent possible, from review of the chart and collateral sources of information.     Medications:  piperacillin/tazobactam IVPB... 3.375 Gram(s) IV Intermittent once  norepinephrine Infusion 0.05 MICROgram(s)/kG/Min IV Continuous <Continuous>  ketamine Infusion. 1 mG/kG/Hr IV Continuous <Continuous>  ketamine Injectable 38 milliGRAM(s) IV Push Once  lactated ringers Bolus 1000 milliLiter(s) IV Bolus once  sodium chloride 0.9% Bolus 1150 milliLiter(s) IV Bolus once  chlorhexidine 0.12% Liquid 15 milliLiter(s) Oral Mucosa every 12 hours  chlorhexidine 2% Cloths 1 Application(s) Topical <User Schedule>  chlorhexidine 4% Liquid 1 Application(s) Topical <User Schedule>    Mode: AC/ CMV (Assist Control/ Continuous Mandatory Ventilation)  RR (machine): 14  TV (machine): 350  FiO2: 40  PEEP: 5  ITime: 1  MAP: 7  PIP: 17    ICU Vital Signs Last 24 Hrs  T(C): 36.3 (26 Oct 2021 14:00), Max: 36.3 (26 Oct 2021 14:00)  T(F): 97.4 (26 Oct 2021 14:00), Max: 97.4 (26 Oct 2021 14:00)  HR: 116 (26 Oct 2021 14:48) (112 - 134)  BP: 995/- (26 Oct 2021 14:30) (107/59 - 995/-)  BP(mean): --  ABP: --  ABP(mean): --  RR: 14 (26 Oct 2021 14:00) (12 - 14)  SpO2: 100% (26 Oct 2021 14:48) (98% - 100%)    ABG - ( 26 Oct 2021 14:42 )  pH, Arterial: 7.36  pH, Blood: x     /  pCO2: 58    /  pO2: 173   / HCO3: 33    / Base Excess: 7.4   /  SaO2: 100.0     I&O's Detail    LABS:             12.9   7.32  )-----------( 258      ( 26 Oct 2021 14:04 )             37.6     10-26    141  |  102  |  9   ----------------------------<  142<H>  3.6   |  32<H>  |  1.70<H>    Ca    8.6      26 Oct 2021 14:04    TPro  6.6  /  Alb  3.1<L>  /  TBili  <0.1<L>  /  DBili  x   /  AST  18  /  ALT  25  /  AlkPhos  52  10-26    CAPILLARY BLOOD GLUCOSE  POCT Blood Glucose.: 127 mg/dL (26 Oct 2021 14:02)    PT/INR - ( 26 Oct 2021 14:04 )   PT: 11.2 sec;   INR: 0.96 ratio    PTT - ( 26 Oct 2021 14:04 )  PTT:32.7 sec    CULTURES:    Physical Examination:  General: +Intubated/sedated.  HEENT: Pinpoint pupils b/l.   PULM: Clear to auscultation bilaterally.  CVS: s1/s2.  ABD: Soft, nondistended, nontender, normoactive bowel sounds.  EXT: No edema, nontender  SKIN: Warm.  NEURO: +Gag reflex. Withdraws to noxious stimuli.     RADIOLOGY:   < from: CT Angio Head w/ IV Cont (10.26.21 @ 14:38) >  EXAM:  CT PERFUSION W MAPS IC                        EXAM:  CT ANGIO BRAIN (W)AW IC                        EXAM:  CT BRAIN STROKE PROTOCOL                        EXAM:  CT ANGIO NECK (W)AW IC                        PROCEDURE DATE:  10/26/2021    IMPRESSION:  HEAD CT AND RAPID PERFUSION:  HEAD CT: Mild volume loss, microvascular disease, no acute hemorrhage or midline shift.  CT PERFUSION demonstrated:  INFARCT CORE: CBF <30%: 0 mL, TISSUE AT RISK:  Tmax > 6 s: 0 ml, MISMATCH RATIO: none  If symptoms persist consider follow up head CT or MRI, MRA  if no contraindication.  CTA COW:  Patent intracranial circulation without flow limiting stenosis  CTA NECK: Patent, CCAs, ECAs, ICAs, no  hemodynamically significant stenosis at  ICA origins by NASCET criteria.  Bilateral vertebral arteries are patent without flow limiting stenosis.  Biapical scarring with slight patchy opacification, please see dedicated report of the chest for chest findings.      51y F PMHx Anxiety, Depression, Anorexia, w/ a recent ORIF of the left distal radius by Dr. Veliz 5 days ago. Has been taking her usual medications for Depression/Anxiety/Anorexia, plus the occasional pain medicine the  has been holding and giving. This afternoon pt was in pain  gave her the pain medication and by 1 pm she was unable to be awoken, he did a home pulse ox found the sat to be 70's and called 911. EMS arrived pt was assisted with a BVM, given 4 mg narcan iv/in with no change.  is unsure if her symptoms are due to her use of Ambien, Valium in addition to the narcotic pain meds. Pt intubated for hypoxic respiratory failure, airway protection. VAC 14 / 350 / +5 / 40%. Sedated w/ Ketamine gtt.   CTA head/neck negative. UTox only positive for Benzo, Opioid. Alcohol level 13. T recal 94.8. ABG pH 7.36,  pCO2: 58, pO2: 173, HCO3: 33. Lactate 4.6.   ICU consulted for further management.   Assessment:  1. Acute Hypoxic / Hypercarbic Respiratory Failure   2. Polysubstance Overdose  3. SUSANA    Plan:  NEURO:  -Sedated w/ Ketamine gtt. Goal RASS 0 to -1.  -CTA head/neck negative. UTox only positive for Benzo, Opioid. Alcohol level 13. Will send Ammonia.     CV:  -Maintain goal MAP >65-70.  -Troponin negative. Trend Troponin/EKGs. Cardiology consult.   -Keep K~4 and Mg>2 for optimal arrhythmia suppression.     RESP:  -Patient currently on Full vent support, VAC 14 / 350 / +5 / 40%  -titrate settings to maintain SaO2 >90%, or pH >7.25  -consider low tidal volume ventilation strategy w/ goal Tv 4-6 cc/kg of ideal body weight  -plateau pressure goal <30  -Peridex oral care  -aggressive pulmonary toilet  -daily sedation vacation with spontaneous breathing trial if clinical condition warrants, discuss with respiratory therapy     RENAL:  -Renal function currently w/ SUSANA (baseline on 10/15 0.71)  -trend lytes/Scr daily with BMP  -I's and O's, goal UOP 0.5 cc/kg/hr  -renal dose meds and avoid nephrotoxins     GI:  -NPO.    ENDO:  -Aggressive glycemic control to limit FS glucose to <180mg/dl. BS WNL.     ID:  -Hypothermic, no leukocytosis. Elevated lactate. Given +3L IVF boluses, x1 dose of Zosyn in ED. BloodCx/UrineCx sent. SputumCx ordered. Will monitor off abx. Will give additional IVF bolus now, trend lactate.     HEME:  -DVT ppx w/ SC Heparin.     DISPO: D/w ICU intensivist Dr. Hidalgo - will admit to MICU.     CRITICAL CARE TIME SPENT:  45 minutes of critical care time spent providing medical care for patient's acute illness/conditions that impairs at least one vital organ system and/or poses a high risk of imminent or life threatening deterioration in the patient's condition. It includes time spent evaluating and treating the patient's acute illness as well as time spent reviewing labs, radiology, discussing goals of care with patient and/or patient's family, and discussing the case with a multidisciplinary team, including the eICU, in an effort to prevent further life threatening deterioration or end organ damage. This time is independent of any procedures performed.

## 2021-10-26 NOTE — PATIENT PROFILE ADULT - FLU SEASON?
89 year old F with PMHx of CAD, CABG x3, CVA (chronic left occipital lobe), spinal stenosis, sciatica s/p 3 epidural injections for left leg pain who presents with multiple episodes of word finding difficulties since 1 day ago and left facial numbness admitted to stroke telemetry to r/o stroke
Yes...

## 2021-10-26 NOTE — ED PROVIDER NOTE - PROGRESS NOTE DETAILS
I had an at length discussio with  who had to rush home to take kids off the bus he is aware that we are unsure at this time what the cuase of the respiratory depression ams is due to and pt will need icu care ortho aware  icu consulted aware ct neg cardiology consulted for abnormal ekg  icu aware of neg ct  blood cx and abx for aspiration pn ordered ct neg cardiology consulted for abnormal ekg  icu aware of neg ct  blood cx and abx for aspiration pn ordered   called to notify of results.

## 2021-10-26 NOTE — CONSULT NOTE ADULT - SUBJECTIVE AND OBJECTIVE BOX
[facility]         Jere Louis, Saniya Chambers, Lyric, Alise Graves        [phone] (office)    Reason for Consult: tachycardia, overdose     HPI: 50yo F w/ PMH of anxiety, depression, anorexia, early menopause, osteoporosis, GERD, strabismus s/p 2 surgeries, endometriosis, diverticulitis s/p colon resection Pt presenting to ER for possible drug overdose. Cardiology consulted for tachycardia and overdose. Hx taken from . Patient underwent ORIF by Dr. Veliz 5 days ago for left wrist fracture and completed a course of antibiotics. Her  had been giving her pain medications (as he was afraid she has a tendency to take more). As per , pt has no hx of drug abuse, however does have anorexia and anxiety and therefore takes valium BID PRN, ambien 12.5 mg at bedtime for sleep. Pt also taking fluoxetine 20 mg BID, zolpidem 12.5 mg.  has been giving her percocet 10 mg q 6 hrs for her wrist pain.  denies etoh abuse hx, drug abuse. Pt is a former smoker, however uses nicotine patches currently to help her anxiety.  denies hx of SI, friends who could have given her access to drugs, missing etoh in the home.  denies severe stress, however patient ha s alow pain tolerance and has been sad about her wrist pain.  last saw patient at 1200 today, appeared as if something was wrong, 40 minutes later pt was "passed out" on the bed, unable to arose to voice, did not awake to tapping her cheeks Home O2 monitor showed O2 sat 77%.  states her pill bottles were not overused and have what looks like the correct amount of pills.  states he does not think she drank etoh today, however did note that he bought her rubbing alcohol yesterday to remove fabric pieces from her skin, and did note some of the bottle was used. As per chart review, EMS started BVM, 4 mg narcan caused no change. Upon ER arrival patient was sinus tachycardic and was snoring. Pt seen and examined at bedside, noted to be tachycardia @ 117bpm and hypotensive at 86/49, on ventilator.       PAST MEDICAL & SURGICAL HISTORY:  Anxiety    Endometriosis determined by laparoscopy    Strabismus    GERD (gastroesophageal reflux disease)    Diverticulitis  h/o    Osteoporosis    Lymphadenopathy    H/O alopecia    H/O heartburn    History of diverticulitis    Closed Colles&#x27; fracture of left radius with malunion, subsequent encounter    Endometriosis determined by laparoscopy  over 15 years ago    History of strabismus surgery  2x    S/P colon resection  2015    S/P appendectomy  2015        SOCIAL HISTORY: No active tobacco, alcohol or illicit drug use  as per :   quit smoking 15 years ago, smoked for 10 years (unsure how many a day), however wears nicotine patches as it helps her anxiety   patient drinks 1 serving of martini socially, denies illicit drug use   unemployed     FAMILY HISTORY:  Family history of heart disease  father  83        Home Medications:  Ambien: 12.5 milligram(s) orally once a day (at bedtime) (21 Oct 2021 14:06)  ferrous gluconate: orally once a day (21 Oct 2021 14:06)  NexIUM 20 mg oral delayed release capsule: 1 cap(s) orally once a day (21 Oct 2021 14:06)  nutrafol: orally once a day (21 Oct 2021 14:06)  prenatal vitamins: orally once a day (21 Oct 2021 14:06)  probiotics:  (21 Oct 2021 14:06)  propiderm: orally once a day (21 Oct 2021 14:06)  PROzac 20 mg oral capsule: 1 cap(s) orally once a day  mid day (21 Oct 2021 14:06)  pumpkin seed oil: once a day (21 Oct 2021 14:06)  rosemary extract: orally once a day (21 Oct 2021 14:06)  Saw [place]: orally once a day (21 Oct 2021 14:06)  Super B Complex oral tablet: 1 tab(s) orally once a day (21 Oct 2021 14:06)  tumeric: once a day (21 Oct 2021 14:06)  Valium: 40 mg bid (21 Oct 2021 14:06)  Vitamin D3: orally once a day (21 Oct 2021 14:06)  ZyrTEC: orally once a day (21 Oct 2021 14:06)      MEDICATIONS  (STANDING):  chlorhexidine 0.12% Liquid 15 milliLiter(s) Oral Mucosa every 12 hours  chlorhexidine 2% Cloths 1 Application(s) Topical <User Schedule>  chlorhexidine 4% Liquid 1 Application(s) Topical <User Schedule>  ketamine Infusion. 1 mG/kG/Hr (3.8 mL/Hr) IV Continuous <Continuous>  ketamine Injectable 38 milliGRAM(s) IV Push Once  lactated ringers Bolus 1000 milliLiter(s) IV Bolus once  norepinephrine Infusion 0.05 MICROgram(s)/kG/Min (3.56 mL/Hr) IV Continuous <Continuous>  piperacillin/tazobactam IVPB... 3.375 Gram(s) IV Intermittent once  sodium chloride 0.9% Bolus 1150 milliLiter(s) IV Bolus once    MEDICATIONS  (PRN):      Allergies    predniSONE (Unknown)  Tree Nuts (Hives; Other)    Intolerances        REVIEW OF SYSTEMS: Negative except as per HPI.    VITAL SIGNS:   Vital Signs Last 24 Hrs  T(C): 36.3 (26 Oct 2021 14:00), Max: 36.3 (26 Oct 2021 14:00)  T(F): 97.4 (26 Oct 2021 14:00), Max: 97.4 (26 Oct 2021 14:00)  HR: 116 (26 Oct 2021 14:48) (112 - 134)  BP: 995/- (26 Oct 2021 14:30) (107/59 - 995/-)  BP(mean): --  RR: 14 (26 Oct 2021 14:00) (12 - 14)  SpO2: 100% (26 Oct 2021 14:48) (98% - 100%)    I&O's Summary      PHYSICAL EXAM:  Constitutional: on ventilator, cachectic, very frail   HEENT: dry mucous membranes, Pupils constricted however reactive to light, round   Pulmonary: Non-labored, breath sounds are clear bilaterally, no wheezing, rales or rhonchi  Cardiovascular: +S1, S2, tacycardic, no murmur  Gastrointestinal: Soft, nontender, nondistended, normoactive bowel sounds  Extremities: LUE in hard cast, RUE cold to touch, no LE edema    Skin: no tracck marks noted on UEs or feet     LABS: All Labs Reviewed:                        12.9   7.32  )-----------( 258      ( 26 Oct 2021 14:04 )             37.6     26 Oct 2021 14:04    141    |  102    |  9      ----------------------------<  142    3.6     |  32     |  1.70     Ca    8.6        26 Oct 2021 14:04    TPro  6.6    /  Alb  3.1    /  TBili  <0.1   /  DBili  x      /  AST  18     /  ALT  25     /  AlkPhos  52     26 Oct 2021 14:04    PT/INR - ( 26 Oct 2021 14:04 )   PT: 11.2 sec;   INR: 0.96 ratio         PTT - ( 26 Oct 2021 14:04 )  PTT:32.7 sec      Blood Culture:         EKG: Sinus tachycardia. Rate @115bpm. ST and T wave changes in II III AVF not seen in EKG from Oct 23 2021     RADIOLOGY:    HEAD CT AND RAPID PERFUSION:  HEAD CT: Mild volume loss, microvascular disease, no acute hemorrhage or midline shift.  CT PERFUSION demonstrated:  INFARCT CORE: CBF <30%: 0 mL, TISSUE AT RISK:  Tmax > 6 s: 0 ml, MISMATCH RATIO: none  If symptoms persist consider follow up head CT or MRI, MRA  if no contraindication.    CTA COW:  Patent intracranial circulation without flow limiting stenosis    CTA NECK: Patent, CCAs, ECAs, ICAs, no  hemodynamically significant stenosis at  ICA origins by NASCET criteria.  Bilateral vertebral arteries are patent without flow limiting stenosis      CXR:   [facility]         Jere Louis, Saniya Chambers, Lyric, Alise Graves        [phone] (office)    Reason for Consult: tachycardia, overdose     HPI: 50yo F w/ PMH of anxiety, depression, anorexia, early menopause, osteoporosis, GERD, strabismus s/p 2 surgeries, endometriosis, diverticulitis s/p colon resection Pt presenting to ER for possible drug overdose. Cardiology consulted for tachycardia and overdose. Hx taken from . Patient underwent ORIF by Dr. Veliz 5 days ago for left wrist fracture and completed a course of antibiotics. Her  had been giving her pain medications (as he was afraid she has a tendency to take more). As per , pt has no hx of drug abuse, however does have anorexia and anxiety and therefore takes valium BID PRN, ambien 12.5 mg at bedtime for sleep. Pt also taking fluoxetine 20 mg BID, zolpidem 12.5 mg.  has been giving her percocet 10 mg q 6 hrs for her wrist pain.  denies etoh abuse hx, drug abuse. Pt is a former smoker, however uses nicotine patches currently to help her anxiety.  denies hx of SI, friends who could have given her access to drugs, missing etoh in the home.  denies severe stress, however patient ha s alow pain tolerance and has been sad about her wrist pain.  last saw patient at 1200 today, appeared as if something was wrong, 40 minutes later pt was "passed out" on the bed, unable to arose to voice, did not awake to tapping her cheeks Home O2 monitor showed O2 sat 77%.  states her pill bottles were not overused and have what looks like the correct amount of pills.  states he does not think she drank etoh today, however did note that he bought her rubbing alcohol yesterday to remove fabric pieces from her skin, and did note some of the bottle was used. As per chart review, EMS started BVM, 4 mg narcan caused no change. Upon ER arrival patient was sinus tachycardic and was snoring. Pt seen and examined at bedside, noted to be tachycardia @ 117bpm and hypotensive at 86/49, on ventilator.       PAST MEDICAL & SURGICAL HISTORY:  Anxiety    Endometriosis determined by laparoscopy    Strabismus    GERD (gastroesophageal reflux disease)    Diverticulitis  h/o    Osteoporosis    Lymphadenopathy    H/O alopecia    H/O heartburn    History of diverticulitis    Closed Colles&#x27; fracture of left radius with malunion, subsequent encounter    Endometriosis determined by laparoscopy  over 15 years ago    History of strabismus surgery  2x    S/P colon resection  2015    S/P appendectomy  2015        SOCIAL HISTORY: No active tobacco, alcohol or illicit drug use  as per :   quit smoking 15 years ago, smoked for 10 years (unsure how many a day), however wears nicotine patches as it helps her anxiety   patient drinks 1 serving of martini socially, denies illicit drug use   unemployed     FAMILY HISTORY:  Family history of heart disease  father  83        Home Medications:  Ambien: 12.5 milligram(s) orally once a day (at bedtime) (21 Oct 2021 14:06)  ferrous gluconate: orally once a day (21 Oct 2021 14:06)  NexIUM 20 mg oral delayed release capsule: 1 cap(s) orally once a day (21 Oct 2021 14:06)  nutrafol: orally once a day (21 Oct 2021 14:06)  prenatal vitamins: orally once a day (21 Oct 2021 14:06)  probiotics:  (21 Oct 2021 14:06)  propiderm: orally once a day (21 Oct 2021 14:06)  PROzac 20 mg oral capsule: 1 cap(s) orally once a day  mid day (21 Oct 2021 14:06)  pumpkin seed oil: once a day (21 Oct 2021 14:06)  rosemary extract: orally once a day (21 Oct 2021 14:06)  Saw [place]: orally once a day (21 Oct 2021 14:06)  Super B Complex oral tablet: 1 tab(s) orally once a day (21 Oct 2021 14:06)  tumeric: once a day (21 Oct 2021 14:06)  Valium: 40 mg bid (21 Oct 2021 14:06)  Vitamin D3: orally once a day (21 Oct 2021 14:06)  ZyrTEC: orally once a day (21 Oct 2021 14:06)      MEDICATIONS  (STANDING):  chlorhexidine 0.12% Liquid 15 milliLiter(s) Oral Mucosa every 12 hours  chlorhexidine 2% Cloths 1 Application(s) Topical <User Schedule>  chlorhexidine 4% Liquid 1 Application(s) Topical <User Schedule>  ketamine Infusion. 1 mG/kG/Hr (3.8 mL/Hr) IV Continuous <Continuous>  ketamine Injectable 38 milliGRAM(s) IV Push Once  lactated ringers Bolus 1000 milliLiter(s) IV Bolus once  norepinephrine Infusion 0.05 MICROgram(s)/kG/Min (3.56 mL/Hr) IV Continuous <Continuous>  piperacillin/tazobactam IVPB... 3.375 Gram(s) IV Intermittent once  sodium chloride 0.9% Bolus 1150 milliLiter(s) IV Bolus once    MEDICATIONS  (PRN):      Allergies    predniSONE (Unknown)  Tree Nuts (Hives; Other)    Intolerances        REVIEW OF SYSTEMS: Negative except as per HPI.    VITAL SIGNS:   Vital Signs Last 24 Hrs  T(C): 36.3 (26 Oct 2021 14:00), Max: 36.3 (26 Oct 2021 14:00)  T(F): 97.4 (26 Oct 2021 14:00), Max: 97.4 (26 Oct 2021 14:00)  HR: 116 (26 Oct 2021 14:48) (112 - 134)  BP: 995/- (26 Oct 2021 14:30) (107/59 - 995/-)  BP(mean): --  RR: 14 (26 Oct 2021 14:00) (12 - 14)  SpO2: 100% (26 Oct 2021 14:48) (98% - 100%)    I&O's Summary      PHYSICAL EXAM:  Constitutional: on ventilator, cachectic, very frail   HEENT: dry mucous membranes, Pupils constricted however reactive to light, round   Pulmonary: Non-labored, breath sounds are clear bilaterally, no wheezing, rales or rhonchi  Cardiovascular: +S1, S2, tacycardic, no murmur  Gastrointestinal: Soft, nontender, nondistended, normoactive bowel sounds  Extremities: LUE in hard cast, RUE cold to touch, no LE edema    Skin: no tracck marks noted on UEs or feet     LABS: All Labs Reviewed:                        12.9   7.32  )-----------( 258      ( 26 Oct 2021 14:04 )             37.6     26 Oct 2021 14:04    141    |  102    |  9      ----------------------------<  142    3.6     |  32     |  1.70     Ca    8.6        26 Oct 2021 14:04    TPro  6.6    /  Alb  3.1    /  TBili  <0.1   /  DBili  x      /  AST  18     /  ALT  25     /  AlkPhos  52     26 Oct 2021 14:04    PT/INR - ( 26 Oct 2021 14:04 )   PT: 11.2 sec;   INR: 0.96 ratio         PTT - ( 26 Oct 2021 14:04 )  PTT:32.7 sec      Blood Culture:         EKG: Sinus tachycardia. Rate @115bpm. ST and T wave changes in II III AVF not seen in EKG from Oct 23 2021     RADIOLOGY:    HEAD CT AND RAPID PERFUSION:  HEAD CT: Mild volume loss, microvascular disease, no acute hemorrhage or midline shift.  CT PERFUSION demonstrated:  INFARCT CORE: CBF <30%: 0 mL, TISSUE AT RISK:  Tmax > 6 s: 0 ml, MISMATCH RATIO: none  If symptoms persist consider follow up head CT or MRI, MRA  if no contraindication.    CTA COW:  Patent intracranial circulation without flow limiting stenosis    CTA NECK: Patent, CCAs, ECAs, ICAs, no  hemodynamically significant stenosis at  ICA origins by NASCET criteria.  Bilateral vertebral arteries are patent without flow limiting stenosis      CXR:   [facility]         Jere Louis, Saniya Chambers, Lyric, Alise Graves        [phone] (office)    Reason for Consult: tachycardia, overdose     HPI: 50yo F w/ PMH of anxiety, depression, anorexia, early menopause, osteoporosis, GERD, strabismus s/p 2 surgeries, endometriosis, diverticulitis s/p colon resection Pt presenting to ER for possible drug overdose. Cardiology consulted for tachycardia and overdose. Hx taken from . Patient underwent ORIF by Dr. Veliz 5 days ago for left wrist fracture and completed a course of antibiotics. Her  had been giving her pain medications (as he was afraid she has a tendency to take more). As per , pt has no hx of drug abuse, however does have anorexia and anxiety and therefore takes valium BID PRN, ambien 12.5 mg at bedtime for sleep. Pt also taking fluoxetine 20 mg BID, zolpidem 12.5 mg.  has been giving her percocet 10 mg q 6 hrs for her wrist pain.  denies etoh abuse hx, drug abuse. Pt is a former smoker, however uses nicotine patches currently to help her anxiety.  denies hx of SI, friends who could have given her access to drugs, missing etoh in the home.  denies severe stress, however patient ha s alow pain tolerance and has been sad about her wrist pain.  last saw patient at 1200 today, appeared as if something was wrong, 40 minutes later pt was "passed out" on the bed, unable to arose to voice, did not awake to tapping her cheeks Home O2 monitor showed O2 sat 77%.  states her pill bottles were not overused and have what looks like the correct amount of pills.  states he does not think she drank etoh today, however did note that he bought her rubbing alcohol yesterday to remove fabric pieces from her skin, and did note some of the bottle was used. As per chart review, EMS started BVM, 4 mg narcan caused no change. Upon ER arrival patient was sinus tachycardic and was snoring. Pt seen and examined at bedside, noted to be tachycardia @ 117bpm and hypotensive at 86/49, on ventilator.       PAST MEDICAL & SURGICAL HISTORY:  Anxiety    Endometriosis determined by laparoscopy    Strabismus    GERD (gastroesophageal reflux disease)    Diverticulitis  h/o    Osteoporosis    Lymphadenopathy    H/O alopecia    H/O heartburn    History of diverticulitis    Closed Colles&#x27; fracture of left radius with malunion, subsequent encounter    Endometriosis determined by laparoscopy  over 15 years ago    History of strabismus surgery  2x    S/P colon resection  2015    S/P appendectomy  2015        SOCIAL HISTORY: No active tobacco, alcohol or illicit drug use  as per :   quit smoking 15 years ago, smoked for 10 years (unsure how many a day), however wears nicotine patches as it helps her anxiety   patient drinks 1 serving of martini socially, denies illicit drug use   unemployed     FAMILY HISTORY:  Family history of heart disease  father  83        Home Medications:  Ambien: 12.5 milligram(s) orally once a day (at bedtime) (21 Oct 2021 14:06)  ferrous gluconate: orally once a day (21 Oct 2021 14:06)  NexIUM 20 mg oral delayed release capsule: 1 cap(s) orally once a day (21 Oct 2021 14:06)  nutrafol: orally once a day (21 Oct 2021 14:06)  prenatal vitamins: orally once a day (21 Oct 2021 14:06)  probiotics:  (21 Oct 2021 14:06)  propiderm: orally once a day (21 Oct 2021 14:06)  PROzac 20 mg oral capsule: 1 cap(s) orally once a day  mid day (21 Oct 2021 14:06)  pumpkin seed oil: once a day (21 Oct 2021 14:06)  rosemary extract: orally once a day (21 Oct 2021 14:06)  Saw [place]: orally once a day (21 Oct 2021 14:06)  Super B Complex oral tablet: 1 tab(s) orally once a day (21 Oct 2021 14:06)  tumeric: once a day (21 Oct 2021 14:06)  Valium: 40 mg bid (21 Oct 2021 14:06)  Vitamin D3: orally once a day (21 Oct 2021 14:06)  ZyrTEC: orally once a day (21 Oct 2021 14:06)      MEDICATIONS  (STANDING):  chlorhexidine 0.12% Liquid 15 milliLiter(s) Oral Mucosa every 12 hours  chlorhexidine 2% Cloths 1 Application(s) Topical <User Schedule>  chlorhexidine 4% Liquid 1 Application(s) Topical <User Schedule>  ketamine Infusion. 1 mG/kG/Hr (3.8 mL/Hr) IV Continuous <Continuous>  ketamine Injectable 38 milliGRAM(s) IV Push Once  lactated ringers Bolus 1000 milliLiter(s) IV Bolus once  norepinephrine Infusion 0.05 MICROgram(s)/kG/Min (3.56 mL/Hr) IV Continuous <Continuous>  piperacillin/tazobactam IVPB... 3.375 Gram(s) IV Intermittent once  sodium chloride 0.9% Bolus 1150 milliLiter(s) IV Bolus once    MEDICATIONS  (PRN):      Allergies    predniSONE (Unknown)  Tree Nuts (Hives; Other)    Intolerances        REVIEW OF SYSTEMS: Limited 2/2 comorbidities     VITAL SIGNS:   Vital Signs Last 24 Hrs  T(C): 36.3 (26 Oct 2021 14:00), Max: 36.3 (26 Oct 2021 14:00)  T(F): 97.4 (26 Oct 2021 14:00), Max: 97.4 (26 Oct 2021 14:00)  HR: 116 (26 Oct 2021 14:48) (112 - 134)  BP: 995/- (26 Oct 2021 14:30) (107/59 - 995/-)  BP(mean): --  RR: 14 (26 Oct 2021 14:00) (12 - 14)  SpO2: 100% (26 Oct 2021 14:48) (98% - 100%)    I&O's Summary      PHYSICAL EXAM:  Constitutional: on ventilator, cachectic, very frail   HEENT: dry mucous membranes, Pupils constricted however reactive to light, round   Pulmonary: Decreased breath sounds b/l. No rales, crackles or wheeze appreciated.   Cardiovascular: +S1, S2, tachy, no murmur  Gastrointestinal: Soft, nontender, nondistended, normoactive bowel sounds  Extremities: LUE in hard cast, RUE cold to touch, no LE edema    Skin: no track marks noted on UEs or feet   psych unable to assess     LABS: All Labs Reviewed:                        12.9   7.32  )-----------( 258      ( 26 Oct 2021 14:04 )             37.6     26 Oct 2021 14:04    141    |  102    |  9      ----------------------------<  142    3.6     |  32     |  1.70     Ca    8.6        26 Oct 2021 14:04    TPro  6.6    /  Alb  3.1    /  TBili  <0.1   /  DBili  x      /  AST  18     /  ALT  25     /  AlkPhos  52     26 Oct 2021 14:04    PT/INR - ( 26 Oct 2021 14:04 )   PT: 11.2 sec;   INR: 0.96 ratio         PTT - ( 26 Oct 2021 14:04 )  PTT:32.7 sec      Blood Culture:         EKG: Sinus tachycardia. Rate @115bpm. ST and T wave changes in II III AVF not seen in EKG from Oct 23 2021     RADIOLOGY:    HEAD CT AND RAPID PERFUSION:  HEAD CT: Mild volume loss, microvascular disease, no acute hemorrhage or midline shift.  CT PERFUSION demonstrated:  INFARCT CORE: CBF <30%: 0 mL, TISSUE AT RISK:  Tmax > 6 s: 0 ml, MISMATCH RATIO: none  If symptoms persist consider follow up head CT or MRI, MRA  if no contraindication.    CTA COW:  Patent intracranial circulation without flow limiting stenosis    CTA NECK: Patent, CCAs, ECAs, ICAs, no  hemodynamically significant stenosis at  ICA origins by NASCET criteria.  Bilateral vertebral arteries are patent without flow limiting stenosis      CXR:

## 2021-10-26 NOTE — CHART NOTE - NSCHARTNOTEFT_GEN_A_CORE
50 yo woman with Hx depression, anxiety on chronic valium, Hx anorexia, ORIF L distal radius on 10/21, she was found by her  today unresponsive in bed and he suspected medication OD.  She was taking percocet 2tabs q6h (managed by her ), she was in USOH until noon her gave her percocet 2 tabs and she seemed groggy/not herself but denied taking any additional medications/EtOH.  He found her approx 45min later unresponsive with snoring shallow respirations, SpO2 70s on home pulse ox and called EMS.  She was intubated for airway protection in the ED.  She is found to have SUSANA, lactic acidosis, and a chronic respiratory acidosis, EtOH detected at 13.     inspected her bottle of ambien and valium and seemed to have appropriate number of tablets.  Only EtOH she may have had access to was rubbing alcohol but doubts she took this or that this was a SA.  She is currently on ketamine for pain and sedation.  CTH and CTA H and N unrevealing.    exam in ICU  sedated on ketamine  PERRL, ?squeezed R hand to command but not reproducible  lungs clear  RRR  abd soft, NTND  LUE in long cast  no edema, WWP    All labs, data, radiology reviewed.    50 yo woman with Hx depression, anxiety, ORIF L distal radius on 10/21 presenting with obtundation and acute hypoxemic respiratory failure, possible OD from polypharmacy +/- EtOH.  CTA H and N unrevealing, no evidence of seizure activity.      --currently on ketamine for pain control and sedation  will start to wean off  will likely need to restart benzos (takes valium 20 bid at home) and pain meds as she wakes up  check NH3  --hemodynamically stable  check QTc  --continue full vent support  PS trial when more awake  unclear why she has a compensated hypercapnic respiratory failure, ?related to recent narcotic and benzo use?  --SUSANA, suspect prerenal volume depletion, per  eating poorly but drinking fluids  check urine lytes and CPK  lactic acidosis, trend  --NPO  --no infectious concerns at this point  --plan discussed with  in detail  --pt critically ill, CC time 50min

## 2021-10-26 NOTE — ED ADULT NURSE NOTE - OBJECTIVE STATEMENT
50 y/o female was brought in by EMS for possible drug overdose, lethargic and unresponsive with agonal breathing, tachycardic with 's, BVM in progress. Pt noted to have 2 Fentanyl patches on abdomen which were removed MD, IV fluid bolus in progress. As per , pt had recent surgery to her left arm, splint noted. Pt was evaluated by MD, intubated for airway safety. Pt was transported to CT scan and later taken to ICU 7.

## 2021-10-26 NOTE — ED PROCEDURE NOTE - CPROC ED INDICATIONS1
Paged for acute lethargy and hypoxia. Saw pt at bedside with Dr. Novoa and nursing team. Pt satting >90% on 10L O2 and AAOx4. Pt appears volume overloaded; crackles in lower lobes and +1 pitting edema. CXR demonstrated worsening of opacity in left lung. No reported chest pain. No acute ST/T wave changes on EKG. Troponins negative x1.  ABG unimpressive. Will  stop the aggressive fluids and gently diurese despite rhabdo. Will watch BP closely; has been labile. Start Golytely if pt is doing okay and utilize a bed pan and commode as much as possible.  Pt received Duoneb tx and is doing better.    respiratory failure

## 2021-10-26 NOTE — H&P ADULT - NSICDXPASTSURGICALHX_GEN_ALL_CORE_FT
PAST SURGICAL HISTORY:  Endometriosis determined by laparoscopy over 15 years ago    History of strabismus surgery 2x    S/P appendectomy july 2017    S/P colon resection july 2017

## 2021-10-26 NOTE — CONSULT NOTE ADULT - ASSESSMENT
- Patient is not complaining of any cardiac symptoms at this time.  - No clear evidence of acute ischemia, trops negative x 2. Will follow up third set.  - His CKs are flat, suggesting against acute atherosclerotic plaque rupture.  - Biomarker trend is not consistent with plaque rupture but rather demand ischemia. Monitor closely for the development of anginal symptoms or clinical signs of ischemia.   - No acute changes on EKG compared to previous.  - No meaningful evidence of volume overload.  - Previous TTE shows ___.  - BP well controlled, monitor routine hemodynamics.  - Continue ___.  - Monitor and replete lytes, keep K>4, Mg>2.  - Strict I/Os, daily weights.  - Pt has no active ischemia, decompensated heart failure, unstable arrythmia, or severe stenotic valvular disease, and has __ cardiac risk factors. In the setting of low risk ___, he/she is optimized from cardiovascular standpoint to proceed with planned procedure with routine hemodynamic monitoring.   - Pt has no modifiable active cardiac risk factors and in the setting of low risk _____, patient is optimized as best as possible from cardiovascular standpoint to proceed with planned procedure with routine hemodynamic monitoring.   - Other cardiovascular workup will depend on clinical course.  - All other workup per primary team.  - Will continue to follow.             52yo F w/ PMH of anxiety, depression, anorexia, early menopause, osteoporosis, GERD, strabismus s/p 2 surgeries, endometriosis, diverticulitis s/p colon resection, admitted to ICU for overdose, cardio consulted for tachy and EKG changes.     suspected overdose, tachycardia  -sinus tachy 2/2 metabolic disturbance, overdose likely polypharmacy in setting of deconditioning/anorexia   -Istop shows drug abuse  -respiratory acidosis with compensatory metabolic alkalosis   -recommend check echo r/o stress myopathy   -Utox, serum tox, addiction medicine   -demand ischemia  -high sensitivity trop neg x1, unlikely ACS   -EKG sinus tachy 115, T wave changes in II III AVF  - Monitor and replete lytes, keep K>4, Mg>2.  - No meaningful evidence of volume overload.    - All other workup per ICU   - Will continue to follow.             52yo F w/ PMH of anxiety, depression, anorexia, early menopause, osteoporosis, GERD, strabismus s/p 2 surgeries, endometriosis, diverticulitis s/p colon resection, admitted to ICU for overdose, cardio consulted for tachy and EKG changes.     suspected overdose, tachycardia  - sinus tachy 2/2 metabolic disturbance, overdose likely polypharmacy in setting of deconditioning/anorexia   - I-stop shows drug abuse  - appear to be hypercapnic with compensated metabolic alkalosis  - Utox, serum tox, addiction medicine     - EKG sinus tachy 115, T wave changes in II III AVF which are nonspecific   - high sensitivity trop neg x1, unlikely ACS   - check echo     - Monitor and replete lytes, keep K>4, Mg>2.  - No meaningful evidence of volume overload.    - All other workup per ICU   - Will continue to follow.

## 2021-10-26 NOTE — H&P ADULT - NSHPPHYSICALEXAM_GEN_ALL_CORE
T(C): 34.9 (10-26-21 @ 16:21), Max: 36.3 (10-26-21 @ 13:33)  HR: 93 (10-26-21 @ 17:01) (93 - 134)  BP: 995/- (10-26-21 @ 14:30) (107/59 - 995/-)  RR: 14 (10-26-21 @ 14:00) (12 - 14)  SpO2: 100% (10-26-21 @ 17:01) (98% - 100%)    PE exam limited 2/2 patient's clinical status   GENERAL: thin female, sedated, on ventilator   NECK: supple, soft, no thyromegaly noted  LUNGS: good air entry bilaterally, clear to auscultation, symmetric breath sounds, no wheezing or rhonchi appreciated  HEART: soft S1/S2, tachycardic regular rhythm, no murmurs noted, no lower extremity edema  GASTROINTESTINAL: abdomen is soft, nondistended, normoactive bowel sounds, no palpable masses  MUSCULOSKELETAL: no clubbing or cyanosis, no obvious deformity  NEUROLOGIC: AAOx0, patient currently sedated

## 2021-10-26 NOTE — CHART NOTE - NSCHARTNOTEFT_GEN_A_CORE
Search Terms: Scarlett Gonzales, 1970   Search Date: 10/26/2021 16:13:26 PM   Searching on behalf of: [address] [place] [address]   This report was requested by: Mildred monae | Reference #: [id number]         10/25/2021 10/25/2021 oxycodone-acetaminophen 5-325 mg tab  30 3 Sonny Veliz)      10/20/2021 10/21/2021 oxycodone-acetaminophen 5-325 mg tab  30 5 Morgan Grande      10/10/2021 10/11/2021 zolpidem tart er 12.5 mg tab  30 30 Casper Rome MD      10/10/2021 10/11/2021 diazepam 10 mg tablet  112 28 Casper Rome MD      09/14/2021 09/15/2021 diazepam 10 mg tablet  112 28 Casper Rome MD      08/30/2021 08/30/2021 oxycodone-acetaminophen 5-325 mg tablet  20 5 Sonny Veliz)      08/25/2021 08/25/2021 oxycodone-acetaminophen 5-325 mg tablet  20 5 Martin Doss DO      08/16/2021 08/17/2021 diazepam 10 mg tablet  112 28 Casper Rome MD      08/15/2021 09/14/2021 zolpidem tart er 12.5 mg tab  30 30 Casper Rome MD      08/15/2021 08/17/2021 zolpidem tart er 12.5 mg tab  30 30 Casper Rome MD      07/19/2021 07/21/2021 diazepam 10 mg tablet  112 28 Casper Rome MD      06/22/2021 06/24/2021 diazepam 10 mg tablet  112 28 Casper Rome MD      06/19/2021 07/19/2021 zolpidem tart er 12.5 mg tab  30 30 Casper Rome MD      06/19/2021 06/21/2021 zolpidem tart er 12.5 mg tab  30 30 Casper Rome MD      05/25/2021 05/27/2021 diazepam 10 mg tablet  112 28 Casper Rome MD      04/29/2021 04/29/2021 diazepam 10 mg tablet  112 28 Casper Rome MD      04/19/2021 05/21/2021 zolpidem tart er 12.5 mg tab  30 30 Casper Rome MD      04/19/2021 04/19/2021 zolpidem tart er 12.5 mg tab  30 30 Casper Rome MD      04/18/2021 04/18/2021 zolpidem tartrate 10 mg tablet  1 1 Casper Rome MD      04/01/2021 04/01/2021 diazepam 10 mg tablet  112 28 Casper Rome MD      03/01/2021 03/04/2021 diazepam 10 mg tablet  112 28 Casper Rome MD      02/24/2021 03/23/2021 zolpidem tart er 12.5 mg tab  30 30 Casper Rome MD      02/24/2021 02/24/2021 zolpidem tart er 12.5 mg tab  30 30 Casper Rome MD      02/04/2021 02/04/2021 diazepam 10 mg tablet  112 28 Casper Rome MD      01/07/2021 01/07/2021 diazepam 10 mg tablet  112 28 Casper Rome MD      12/28/2020 01/26/2021 zolpidem tart er 12.5 mg tab  30 30 Casper Rome MD      12/28/2020 12/28/2020 zolpidem tart er 12.5 mg tab  30 30 Casper Rome MD      12/10/2020 12/10/2020 diazepam 10 mg tablet  112 28 Casper Rome MD      11/12/2020 11/12/2020 diazepam 10 mg tablet  112 28 Casper Rome MD      10/30/2020 11/29/2020 zolpidem tart er 12.5 mg tab  30 30 Casper Rome MD      10/30/2020 10/30/2020 zolpidem tart er 12.5 mg tab  30 30 Casper Rome MD

## 2021-10-26 NOTE — H&P ADULT - ASSESSMENT
50yo Female w/ PMHx of anxiety, depression, anorexia presented to the ED in an obtunded state 52yo Female w/ PMHx of anxiety, depression, anorexia presented to the ED in an obtunded state, admitted for toxic metabolic encephalopathy and ARF w/ hypoxia . Patient was intubated in the ED and transferred to the ICU for care.  52yo Female w/ PMHx of anxiety, depression, anorexia presented to the ED in an obtunded state from unknown source found by , admitted for toxic metabolic encephalopathy and Acute respiratory failure w/ hypoxia . Patient was intubated in the ED and transferred to the ICU for care.

## 2021-10-27 ENCOUNTER — TRANSCRIPTION ENCOUNTER (OUTPATIENT)
Age: 51
End: 2021-10-27

## 2021-10-27 LAB
ALBUMIN SERPL ELPH-MCNC: 2.8 G/DL — LOW (ref 3.3–5)
ALP SERPL-CCNC: 43 U/L — SIGNIFICANT CHANGE UP (ref 40–120)
ALT FLD-CCNC: 22 U/L — SIGNIFICANT CHANGE UP (ref 12–78)
ANION GAP SERPL CALC-SCNC: 6 MMOL/L — SIGNIFICANT CHANGE UP (ref 5–17)
ANION GAP SERPL CALC-SCNC: 6 MMOL/L — SIGNIFICANT CHANGE UP (ref 5–17)
APTT BLD: 30.6 SEC — SIGNIFICANT CHANGE UP (ref 27.5–35.5)
AST SERPL-CCNC: 23 U/L — SIGNIFICANT CHANGE UP (ref 15–37)
BASE EXCESS BLDA CALC-SCNC: 9.2 MMOL/L — HIGH (ref -2–3)
BASOPHILS # BLD AUTO: 0.03 K/UL — SIGNIFICANT CHANGE UP (ref 0–0.2)
BASOPHILS NFR BLD AUTO: 0.3 % — SIGNIFICANT CHANGE UP (ref 0–2)
BILIRUB SERPL-MCNC: 0.2 MG/DL — SIGNIFICANT CHANGE UP (ref 0.2–1.2)
BLOOD GAS COMMENTS ARTERIAL: SIGNIFICANT CHANGE UP
BLOOD GAS COMMENTS ARTERIAL: SIGNIFICANT CHANGE UP
BUN SERPL-MCNC: 3 MG/DL — LOW (ref 7–23)
BUN SERPL-MCNC: 4 MG/DL — LOW (ref 7–23)
CALCIUM SERPL-MCNC: 8.1 MG/DL — LOW (ref 8.5–10.1)
CALCIUM SERPL-MCNC: 8.2 MG/DL — LOW (ref 8.5–10.1)
CHLORIDE SERPL-SCNC: 108 MMOL/L — SIGNIFICANT CHANGE UP (ref 96–108)
CHLORIDE SERPL-SCNC: 109 MMOL/L — HIGH (ref 96–108)
CO2 SERPL-SCNC: 31 MMOL/L — SIGNIFICANT CHANGE UP (ref 22–31)
CO2 SERPL-SCNC: 31 MMOL/L — SIGNIFICANT CHANGE UP (ref 22–31)
COVID-19 SPIKE DOMAIN AB INTERP: POSITIVE
COVID-19 SPIKE DOMAIN ANTIBODY RESULT: 96.8 U/ML — HIGH
CREAT SERPL-MCNC: 1.9 MG/DL — HIGH (ref 0.5–1.3)
CREAT SERPL-MCNC: 2.1 MG/DL — HIGH (ref 0.5–1.3)
EOSINOPHIL # BLD AUTO: 0 K/UL — SIGNIFICANT CHANGE UP (ref 0–0.5)
EOSINOPHIL NFR BLD AUTO: 0 % — SIGNIFICANT CHANGE UP (ref 0–6)
ETHANOL SERPL-MCNC: <10 MG/DL — SIGNIFICANT CHANGE UP (ref 0–10)
GAS PNL BLDA: SIGNIFICANT CHANGE UP
GLUCOSE SERPL-MCNC: 146 MG/DL — HIGH (ref 70–99)
GLUCOSE SERPL-MCNC: 152 MG/DL — HIGH (ref 70–99)
HCO3 BLDA-SCNC: 34 MMOL/L — HIGH (ref 21–28)
HCT VFR BLD CALC: 34.4 % — LOW (ref 34.5–45)
HGB BLD-MCNC: 11.9 G/DL — SIGNIFICANT CHANGE UP (ref 11.5–15.5)
HOROWITZ INDEX BLDA+IHG-RTO: 30 — SIGNIFICANT CHANGE UP
IMM GRANULOCYTES NFR BLD AUTO: 0.7 % — SIGNIFICANT CHANGE UP (ref 0–1.5)
INR BLD: 1.04 RATIO — SIGNIFICANT CHANGE UP (ref 0.88–1.16)
LACTATE SERPL-SCNC: 1.1 MMOL/L — SIGNIFICANT CHANGE UP (ref 0.7–2)
LACTATE SERPL-SCNC: 4 MMOL/L — CRITICAL HIGH (ref 0.7–2)
LYMPHOCYTES # BLD AUTO: 0.83 K/UL — LOW (ref 1–3.3)
LYMPHOCYTES # BLD AUTO: 9.7 % — LOW (ref 13–44)
MCHC RBC-ENTMCNC: 33.3 PG — SIGNIFICANT CHANGE UP (ref 27–34)
MCHC RBC-ENTMCNC: 34.6 GM/DL — SIGNIFICANT CHANGE UP (ref 32–36)
MCV RBC AUTO: 96.4 FL — SIGNIFICANT CHANGE UP (ref 80–100)
MONOCYTES # BLD AUTO: 0.35 K/UL — SIGNIFICANT CHANGE UP (ref 0–0.9)
MONOCYTES NFR BLD AUTO: 4.1 % — SIGNIFICANT CHANGE UP (ref 2–14)
NEUTROPHILS # BLD AUTO: 7.32 K/UL — SIGNIFICANT CHANGE UP (ref 1.8–7.4)
NEUTROPHILS NFR BLD AUTO: 85.2 % — HIGH (ref 43–77)
NRBC # BLD: 0 /100 WBCS — SIGNIFICANT CHANGE UP (ref 0–0)
OSMOLALITY SERPL: 340 MOSMOL/KG — HIGH (ref 275–300)
PCO2 BLDA: 52 MMHG — HIGH (ref 32–35)
PH BLDA: 7.42 — SIGNIFICANT CHANGE UP (ref 7.35–7.45)
PLATELET # BLD AUTO: 196 K/UL — SIGNIFICANT CHANGE UP (ref 150–400)
PO2 BLDA: 109 MMHG — HIGH (ref 83–108)
POTASSIUM SERPL-MCNC: 3.7 MMOL/L — SIGNIFICANT CHANGE UP (ref 3.5–5.3)
POTASSIUM SERPL-MCNC: 3.8 MMOL/L — SIGNIFICANT CHANGE UP (ref 3.5–5.3)
POTASSIUM SERPL-SCNC: 3.7 MMOL/L — SIGNIFICANT CHANGE UP (ref 3.5–5.3)
POTASSIUM SERPL-SCNC: 3.8 MMOL/L — SIGNIFICANT CHANGE UP (ref 3.5–5.3)
PROT SERPL-MCNC: 6.1 G/DL — SIGNIFICANT CHANGE UP (ref 6–8.3)
PROTHROM AB SERPL-ACNC: 12.2 SEC — SIGNIFICANT CHANGE UP (ref 10.6–13.6)
RBC # BLD: 3.57 M/UL — LOW (ref 3.8–5.2)
RBC # FLD: 11.7 % — SIGNIFICANT CHANGE UP (ref 10.3–14.5)
SALICYLATES SERPL-MCNC: <1.7 MG/DL — LOW (ref 2.8–20)
SAO2 % BLDA: 99.5 % — HIGH (ref 94–98)
SARS-COV-2 IGG+IGM SERPL QL IA: 96.8 U/ML — HIGH
SARS-COV-2 IGG+IGM SERPL QL IA: POSITIVE
SODIUM SERPL-SCNC: 145 MMOL/L — SIGNIFICANT CHANGE UP (ref 135–145)
SODIUM SERPL-SCNC: 146 MMOL/L — HIGH (ref 135–145)
WBC # BLD: 8.59 K/UL — SIGNIFICANT CHANGE UP (ref 3.8–10.5)
WBC # FLD AUTO: 8.59 K/UL — SIGNIFICANT CHANGE UP (ref 3.8–10.5)

## 2021-10-27 PROCEDURE — 99233 SBSQ HOSP IP/OBS HIGH 50: CPT

## 2021-10-27 PROCEDURE — 71045 X-RAY EXAM CHEST 1 VIEW: CPT | Mod: 26

## 2021-10-27 PROCEDURE — 93010 ELECTROCARDIOGRAM REPORT: CPT

## 2021-10-27 PROCEDURE — 99291 CRITICAL CARE FIRST HOUR: CPT

## 2021-10-27 PROCEDURE — 76775 US EXAM ABDO BACK WALL LIM: CPT | Mod: 26

## 2021-10-27 RX ORDER — MAGNESIUM SULFATE 500 MG/ML
2 VIAL (ML) INJECTION ONCE
Refills: 0 | Status: COMPLETED | OUTPATIENT
Start: 2021-10-27 | End: 2021-10-27

## 2021-10-27 RX ORDER — ACETAMINOPHEN 500 MG
650 TABLET ORAL EVERY 6 HOURS
Refills: 0 | Status: DISCONTINUED | OUTPATIENT
Start: 2021-10-27 | End: 2021-10-27

## 2021-10-27 RX ORDER — OXYCODONE HYDROCHLORIDE 5 MG/1
10 TABLET ORAL EVERY 4 HOURS
Refills: 0 | Status: DISCONTINUED | OUTPATIENT
Start: 2021-10-27 | End: 2021-10-27

## 2021-10-27 RX ORDER — OXYCODONE HYDROCHLORIDE 5 MG/1
10 TABLET ORAL EVERY 4 HOURS
Refills: 0 | Status: DISCONTINUED | OUTPATIENT
Start: 2021-10-27 | End: 2021-10-28

## 2021-10-27 RX ORDER — DIAZEPAM 5 MG
10 TABLET ORAL
Refills: 0 | Status: DISCONTINUED | OUTPATIENT
Start: 2021-10-27 | End: 2021-10-28

## 2021-10-27 RX ORDER — FLUOXETINE HCL 10 MG
20 CAPSULE ORAL DAILY
Refills: 0 | Status: DISCONTINUED | OUTPATIENT
Start: 2021-10-27 | End: 2021-10-28

## 2021-10-27 RX ORDER — SODIUM CHLORIDE 9 MG/ML
1000 INJECTION, SOLUTION INTRAVENOUS ONCE
Refills: 0 | Status: COMPLETED | OUTPATIENT
Start: 2021-10-27 | End: 2021-10-27

## 2021-10-27 RX ORDER — OXYCODONE HYDROCHLORIDE 5 MG/1
5 TABLET ORAL EVERY 4 HOURS
Refills: 0 | Status: DISCONTINUED | OUTPATIENT
Start: 2021-10-27 | End: 2021-10-28

## 2021-10-27 RX ORDER — SODIUM CHLORIDE 9 MG/ML
1000 INJECTION, SOLUTION INTRAVENOUS
Refills: 0 | Status: DISCONTINUED | OUTPATIENT
Start: 2021-10-27 | End: 2021-10-28

## 2021-10-27 RX ORDER — ACETAMINOPHEN 500 MG
650 TABLET ORAL EVERY 6 HOURS
Refills: 0 | Status: DISCONTINUED | OUTPATIENT
Start: 2021-10-27 | End: 2021-10-28

## 2021-10-27 RX ORDER — DIAZEPAM 5 MG
10 TABLET ORAL
Refills: 0 | Status: DISCONTINUED | OUTPATIENT
Start: 2021-10-27 | End: 2021-10-27

## 2021-10-27 RX ORDER — OXYCODONE HYDROCHLORIDE 5 MG/1
5 TABLET ORAL EVERY 4 HOURS
Refills: 0 | Status: DISCONTINUED | OUTPATIENT
Start: 2021-10-27 | End: 2021-10-27

## 2021-10-27 RX ORDER — DIAZEPAM 5 MG
10 TABLET ORAL ONCE
Refills: 0 | Status: DISCONTINUED | OUTPATIENT
Start: 2021-10-27 | End: 2021-10-27

## 2021-10-27 RX ADMIN — Medication 1 MILLIGRAM(S): at 11:12

## 2021-10-27 RX ADMIN — HEPARIN SODIUM 5000 UNIT(S): 5000 INJECTION INTRAVENOUS; SUBCUTANEOUS at 18:07

## 2021-10-27 RX ADMIN — Medication 50 GRAM(S): at 10:21

## 2021-10-27 RX ADMIN — SODIUM CHLORIDE 1000 MILLILITER(S): 9 INJECTION, SOLUTION INTRAVENOUS at 22:38

## 2021-10-27 RX ADMIN — CHLORHEXIDINE GLUCONATE 1 APPLICATION(S): 213 SOLUTION TOPICAL at 05:28

## 2021-10-27 RX ADMIN — OXYCODONE HYDROCHLORIDE 10 MILLIGRAM(S): 5 TABLET ORAL at 21:03

## 2021-10-27 RX ADMIN — Medication 1 MILLIGRAM(S): at 10:17

## 2021-10-27 RX ADMIN — OXYCODONE HYDROCHLORIDE 10 MILLIGRAM(S): 5 TABLET ORAL at 21:30

## 2021-10-27 RX ADMIN — CHLORHEXIDINE GLUCONATE 15 MILLILITER(S): 213 SOLUTION TOPICAL at 05:29

## 2021-10-27 RX ADMIN — Medication 10 MILLIGRAM(S): at 19:02

## 2021-10-27 RX ADMIN — SODIUM CHLORIDE 75 MILLILITER(S): 9 INJECTION, SOLUTION INTRAVENOUS at 12:40

## 2021-10-27 RX ADMIN — CHLORHEXIDINE GLUCONATE 1 APPLICATION(S): 213 SOLUTION TOPICAL at 07:45

## 2021-10-27 RX ADMIN — HEPARIN SODIUM 5000 UNIT(S): 5000 INJECTION INTRAVENOUS; SUBCUTANEOUS at 05:28

## 2021-10-27 RX ADMIN — CHLORHEXIDINE GLUCONATE 15 MILLILITER(S): 213 SOLUTION TOPICAL at 18:07

## 2021-10-27 NOTE — PROGRESS NOTE ADULT - SUBJECTIVE AND OBJECTIVE BOX
Patient is a 51y old  Female who presents with a chief complaint of Overdose (27 Oct 2021 12:42)    24 hour events: Patient transitioned from VAC to CPAP with PS. Weaned off of ketamine sedation. Patient remains intubated but with less machine support. Was given 1mg Ativan injection to stave off benzodiazepine withdrawal. No other acute events. Patient seen and examined at bedside.      REVIEW OF SYSTEMS  limited  clinical status     T(F): 98.6 (10-27-21 @ 11:00), Max: 98.8 (10-27-21 @ 08:00)  HR: 135 (10-27-21 @ 13:00) (93 - 145)  BP: 125/82 (10-27-21 @ 13:00) (85/52 - 995/-)  RR: 22 (10-27-21 @ 13:00) (12 - 67)  SpO2: 96% (10-27-21 @ 13:00) (95% - 100%)  Wt(kg): --    Mode: CPAP with PS, FiO2: 30, PEEP: 5, PS: 5        I&O's Summary    10-26 @ :  -  10-27 @ 07:00  --------------------------------------------------------  IN: 1164.5 mL / OUT: 920 mL / NET: 244.5 mL    10-27 @ :  -  10-27 @ 13:30  --------------------------------------------------------  IN: 56.4 mL / OUT: 300 mL / NET: -243.6 mL      PHYSICAL EXAM  General: thin female, intubated  CNS: Able to move right leg on command, spontaneously moves left leg, can squeeze fingers with right hand, opens eyes momentarily, somnolent   HEENT: PERRL, NC/AT    Resp: CTA b/l, no wheezes, rales, or rhonchi   CVS: s1s2, tachycardic, no rubs, murmurs of gallops   Abd: soft, nondistened, bs+  Ext: no clubbing, cyanosis, or edema   Skin: warm and dry     MEDICATIONS              heparin   Injectable SubCutaneous        sodium chloride 0.45%. IV Continuous      chlorhexidine 0.12% Liquid Oral Mucosa  chlorhexidine 2% Cloths Topical  chlorhexidine 4% Liquid Topical                            11.9   8.59  )-----------( 196      ( 27 Oct 2021 09:36 )             34.4       10-27    146<H>  |  109<H>  |  3<L>  ----------------------------<  146<H>  3.8   |  31  |  2.10<H>    Ca    8.1<L>      27 Oct 2021 09:36  Phos  4.7     10-26  Mg     1.4     10-26    TPro  6.1  /  Alb  2.8<L>  /  TBili  0.2  /  DBili  x   /  AST  23  /  ALT  22  /  AlkPhos  43  10-27    Lactate 4.0           10-27 @ 11:25    Lactate 2.7           10-26 @ 21:12    Lactate 4.6           10-26 @ 15:41      CARDIAC MARKERS ( 26 Oct 2021 21:12 )  x     / x     / 71 U/L / x     / x          PT/INR - ( 27 Oct 2021 09:36 )   PT: 12.2 sec;   INR: 1.04 ratio         PTT - ( 27 Oct 2021 09:36 )  PTT:30.6 sec  Urinalysis Basic - ( 26 Oct 2021 15:48 )    Color: Yellow / Appearance: Clear / S.010 / pH: x  Gluc: x / Ketone: Moderate  / Bili: Small / Urobili: Negative   Blood: x / Protein: 15 / Nitrite: Negative   Leuk Esterase: Negative / RBC: Negative /HPF / WBC 0-2   Sq Epi: x / Non Sq Epi: Occasional / Bacteria: Negative            Radiology: < from: US Renal (10.27.21 @ 11:53) >  EXAM:  US KIDNEY(S)                            PROCEDURE DATE:  10/27/2021          INTERPRETATION:  CLINICAL INFORMATION: Acute kidney injury    COMPARISON: None available.    TECHNIQUE: Sonography of the kidneys and bladder.    FINDINGS:    Rightkidney: 10.0 cm. No renal mass, hydronephrosis or calculi.    Left kidney: 9.4 cm. No renal mass, hydronephrosis or calculi.    Urinary bladder: Within normal limits.    IMPRESSION:    Normal renal ultrasound.              CHACE TINSLEY MD; Attending Radiologist  This document has been electronically signed. Oct 27 2021 11:58AM    < from: Xray Chest 1 View-PORTABLE IMMEDIATE (Xray Chest 1 View-PORTABLE IMMEDIATE .) (10.26.21 @ 19:33) >    EXAM:  XR CHEST PORTABLE URGENT 1V                          EXAM:  XR CHEST PORTABLE IMMED 1V                          EXAM:  XR CHEST PORTABLE IMMED 1V                            PROCEDURE DATE:  10/26/2021          INTERPRETATION:  AP semierect chest on 2021 at 2:53 PM. Patient had an overdose. This is a stroke code. Patient is intubated.    Heart size is within normal limits.    Lung fields and pleural surfaces are unremarkable.    Above findings similar to 2015.    Present film shows intubation.    Follow-up AP chest on 2021 at 6:10 PM. 2 images.    NG tube inserted. Tip coils around the esophagogastric junction. The previously right inferior perihilar infiltrate.    Follow-up AP chest on t 7:17 PM. Right lower lung field infiltrate somewhat improved.    NG tube now has tip entering stomach.    IMPRESSION: As above.    --- End of Report ---            AINSLEY HWITE MD; Attending Radiologist  This document has been electronically signed. Oct 27 2021 12:31PM    < from: CT Angio Head w/ IV Cont (10.26.21 @ 14:38) >    EXAM:  CT PERFUSION W MAPS IC                          EXAM:  CT ANGIO BRAIN (W)AW IC                          EXAM:  CT BRAIN STROKE PROTOCOL                          EXAM:  CT ANGIO NECK (W)AW IC                            PROCEDURE DATE:  10/26/2021          INTERPRETATION:  HEAD CT, CT PERFUSION, CTA OF THE [place] OF [place]ELL AND NECK:    INDICATIONS:  S/P Overdose . May be oxycodone, ambien, valium, Ativan, stroke code, urine positive for benzodiazepines on 2019    TECHNIQUE:    HEAD CT:    Serial axial images were obtained from the skull base to the vertex without the use of intravenous contrast. RAPID artificial intelligence was used for perfusion analysis and for preliminary evaluation of intracranial hemorrhage.    CTA [place] OFWILLIS:    After the intravenous power injection of non-ionic contrast material, serial thin sections were obtained through the intracranial circulation on a multislice CT scanner.  Images were reformatted using a dedicated 3D software package and viewed on a dedicated workstation in multiple planes. A total of 80 cc for the perfusion study, and 60 cc for the CTA of the brain and neck Omnipaque 350 was injected intravenously without complication with 10 cc discarded.    CTA NECK:    After the intravenous power injection of non-ionic contrast material, serial thin sections were obtained through the cervical circulation on a multislice CT scanner.  Images were reformatted using a dedicated 3D software package and viewed on a dedicated workstation in multiple planes.      COMPARISON EXAMINATION: MRI brain, 2019, head CT, 2019, and prior    FINDINGS:    Ventricles and sulci remain minimally prominent, redemonstration bifrontal 6 mm right, 5 mm left subdural hygromas (2:10), without interval change from prior.    INTRA-AXIAL: No new intracranial mass, acute hemorrhage, or midline shift is present.redemonstration nonspecific white matter decreased attenuation likely microvascular disease.  EXTRA-AXIAL: No extra-axial fluid collection is present.  INTRACRANIAL HEMORRHAGE: No intracranial hemorrhage    VISUALIZED SINUSES: No air-fluid levels are identified.  VISUALIZED MASTOIDS:  Clear  CALVARIUM:  Intact  MISCELLANEOUS:  Endotracheal tube in situ, with retained secretions in the naso and oropharynx. Dental amalgam, lucency  along teeth in the mandible and maxilla, suggest dental inspection for likely treated and ongoing dental disease. Biapical parenchymal scarring, heterogeneous thyroid, ultrasound may better assess. Redemonstration degenerative change with rotary levocurvature and reversal of the cervical lordosis which may be positional with disc osteophyte ridge again most pronounced at the C5-6 level, and multilevel uncovertebral facet hypertrophy without change from prior.    CT RAPID PERFUSION:    INFARCT CORE: CBF <30%: 0 mL    TISSUE AT RISK:  Tmax > 6 s: 0 ml    MISMATCH RATIO: none      CTA [place] OF [place]:    ANTERIOR CIRCULATION    ICA  CAVERNOUS, SUPRACLINOID, BIFURCATION SEGMENTS: Patent without flow limiting stenosis.    ANTERIOR CEREBRAL ARTERIES: Bilateral A1, anterior communicating and A2 anterior cerebral arteries are unremarkable in course and caliber without flow limiting stenosis, there is a trifurcation of vessels arising from the anterior mid kidney artery, likely normal anatomic variation.    MIDDLE CEREBRAL ARTERIES: Patent bilateral M1, M2, and distal MCA branches without flow limiting stenosis.    POSTERIOR CIRCULATION:    VERTEBRAL ARTERIES: Patent without flow limiting stenosis    BASILAR ARTERY: Patent no flow limiting stenosis.    POSTERIOR CEREBRAL ARTERIES: Patent without flow limiting stenosis.    CTA NECK:    GREAT VESSELS: Visualized segments are patent, no flow limiting stenosis.    COMMON CAROTID ARTERIES:  RIGHT: Patent without flow limiting stenosis  LEFT: Patent without flow limiting stenosis    CAROTID BULBS:  RIGHT: Patent without flow limiting stenosis  LEFT: Patent without flow limiting stenosis    INTERNAL CAROTID ARTERIES:  RIGHT: Patent no evidencefor any hemodynamically significant stenosis at the ICA origin by NASCET criteria.  LEFT: Patent no evidence for any hemodynamically significant stenosis at the ICA origin by NASCET criteria.    VERTEBRAL ARTERIES:    RIGHT: Patent no evidence for any flow limiting stenosis.  LEFT: Patent no evidence for any flow limiting stenosis.    IMPRESSION:      HEAD CT AND RAPID PERFUSION:  HEAD CT: Mild volume loss, microvascular disease, no acute hemorrhage or midline shift.  CT PERFUSION demonstrated:  INFARCT CORE: CBF <30%: 0 mL, TISSUE AT RISK:  Tmax > 6 s: 0 ml, MISMATCH RATIO: none  If symptoms persist consider follow up head CT or MRI, MRA  if no contraindication.    CTA COW:  Patent intracranial circulation without flow limiting stenosis    CTA NECK: Patent, CCAs, ECAs, ICAs, no  hemodynamically significant stenosis at  ICA origins by NASCET criteria.  Bilateral vertebral arteries are patent without flow limiting stenosis.    Biapical scarring with slight patchy opacification, please see dedicated report of the chest for chest findings.    --- End of Report ---            YVETTE FERNANDEZ MD; Attending Radiologist  This document has been electronically signed. Oct 26 2021  2:38PM      CENTRAL LINE: N          RAGLAND: N                        A-LINE: N                           GLOBAL ISSUE/BEST PRACTICE  Analgesia: N  Sedation: N  CAM-ICU:   HOB elevation: yes  Stress ulcer prophylaxis: N   VTE prophylaxis: Heparin SC  Glycemic control: N  Nutrition: NPO    CODE STATUS: Full Code   GOC discussion: SOFÍA       Patient is a 51y old  Female who presents with a chief complaint of Overdose (27 Oct 2021 12:42)    24 hour events: Patient transitioned from AC to CPAP with PS. Weaned off of ketamine sedation. Patient remains intubated, Was given total of 2mg Ativan injection to prevent benzodiazepine withdrawal.     REVIEW OF SYSTEMS  limited  clinical status     T(F): 98.6 (10-27-21 @ 11:00), Max: 98.8 (10-27-21 @ 08:00)  HR: 135 (10-27-21 @ 13:00) (93 - 145)  BP: 125/82 (10-27-21 @ 13:00) (85/52 - 995/-)  RR: 22 (10-27-21 @ 13:00) (12 - 67)  SpO2: 96% (10-27-21 @ 13:00) (95% - 100%)  Wt(kg): --    Mode: CPAP with PS, FiO2: 30, PEEP: 5, PS: 5        I&O's Summary    10-26 @ :  -  10-27 @ 07:00  --------------------------------------------------------  IN: 1164.5 mL / OUT: 920 mL / NET: 244.5 mL    10-27 @ 07:  -  10-27 @ 13:30  --------------------------------------------------------  IN: 56.4 mL / OUT: 300 mL / NET: -243.6 mL      PHYSICAL EXAM  General: thin female, intubated  CNS: Able to move right leg on command, spontaneously moves left leg, can squeeze fingers with right hand, opens eyes momentarily, somnolent   HEENT: PERRL, NC/AT    Resp: CTA b/l, no wheezes, rales, or rhonchi   CVS: s1s2, tachycardic, no rubs, murmurs of gallops   Abd: soft, nondistened, bs+  Ext: no clubbing, cyanosis, or edema   Skin: warm and dry     MEDICATIONS              heparin   Injectable SubCutaneous        sodium chloride 0.45%. IV Continuous      chlorhexidine 0.12% Liquid Oral Mucosa  chlorhexidine 2% Cloths Topical  chlorhexidine 4% Liquid Topical                            11.9   8.59  )-----------( 196      ( 27 Oct 2021 09:36 )             34.4       10-27    146<H>  |  109<H>  |  3<L>  ----------------------------<  146<H>  3.8   |  31  |  2.10<H>    Ca    8.1<L>      27 Oct 2021 09:36  Phos  4.7     10-26  Mg     1.4     10-26    TPro  6.1  /  Alb  2.8<L>  /  TBili  0.2  /  DBili  x   /  AST  23  /  ALT  22  /  AlkPhos  43  10-27    Lactate 4.0           10-27 @ 11:25    Lactate 2.7           10-26 @ 21:12    Lactate 4.6           10-26 @ 15:41      CARDIAC MARKERS ( 26 Oct 2021 21:12 )  x     / x     / 71 U/L / x     / x          PT/INR - ( 27 Oct 2021 09:36 )   PT: 12.2 sec;   INR: 1.04 ratio         PTT - ( 27 Oct 2021 09:36 )  PTT:30.6 sec  Urinalysis Basic - ( 26 Oct 2021 15:48 )    Color: Yellow / Appearance: Clear / S.010 / pH: x  Gluc: x / Ketone: Moderate  / Bili: Small / Urobili: Negative   Blood: x / Protein: 15 / Nitrite: Negative   Leuk Esterase: Negative / RBC: Negative /HPF / WBC 0-2   Sq Epi: x / Non Sq Epi: Occasional / Bacteria: Negative            Radiology: < from: US Renal (10.27.21 @ 11:53) >  EXAM:  US KIDNEY(S)                            PROCEDURE DATE:  10/27/2021          INTERPRETATION:  CLINICAL INFORMATION: Acute kidney injury    COMPARISON: None available.    TECHNIQUE: Sonography of the kidneys and bladder.    FINDINGS:    Rightkidney: 10.0 cm. No renal mass, hydronephrosis or calculi.    Left kidney: 9.4 cm. No renal mass, hydronephrosis or calculi.    Urinary bladder: Within normal limits.    IMPRESSION:    Normal renal ultrasound.              CHACE TINSLEY MD; Attending Radiologist  This document has been electronically signed. Oct 27 2021 11:58AM    < from: Xray Chest 1 View-PORTABLE IMMEDIATE (Xray Chest 1 View-PORTABLE IMMEDIATE .) (10.26.21 @ 19:33) >    EXAM:  XR CHEST PORTABLE URGENT 1V                          EXAM:  XR CHEST PORTABLE IMMED 1V                          EXAM:  XR CHEST PORTABLE IMMED 1V                            PROCEDURE DATE:  10/26/2021          INTERPRETATION:  AP semierect chest on 2021 at 2:53 PM. Patient had an overdose. This is a stroke code. Patient is intubated.    Heart size is within normal limits.    Lung fields and pleural surfaces are unremarkable.    Above findings similar to 2015.    Present film shows intubation.    Follow-up AP chest on 2021 at 6:10 PM. 2 images.    NG tube inserted. Tip coils around the esophagogastric junction. The previously right inferior perihilar infiltrate.    Follow-up AP chest on t 7:17 PM. Right lower lung field infiltrate somewhat improved.    NG tube now has tip entering stomach.    IMPRESSION: As above.    --- End of Report ---            AINSLEY WHITE MD; Attending Radiologist  This document has been electronically signed. Oct 27 2021 12:31PM    < from: CT Angio Head w/ IV Cont (10.26.21 @ 14:38) >    EXAM:  CT PERFUSION W MAPS IC                          EXAM:  CT ANGIO BRAIN (W)AW IC                          EXAM:  CT BRAIN STROKE PROTOCOL                          EXAM:  CT ANGIO NECK (W)AW IC                            PROCEDURE DATE:  10/26/2021          INTERPRETATION:  HEAD CT, CT PERFUSION, CTA OF THE [place] OF D[place]ELL AND NECK:    INDICATIONS:  S/P Overdose . May be oxycodone, ambien, valium, Ativan, stroke code, urine positive for benzodiazepines on 2019    TECHNIQUE:    HEAD CT:    Serial axial images were obtained from the skull base to the vertex without the use of intravenous contrast. RAPID artificial intelligence was used for perfusion analysis and for preliminary evaluation of intracranial hemorrhage.    CTA [place] OFWILLIS:    After the intravenous power injection of non-ionic contrast material, serial thin sections were obtained through the intracranial circulation on a multislice CT scanner.  Images were reformatted using a dedicated 3D software package and viewed on a dedicated workstation in multiple planes. A total of 80 cc for the perfusion study, and 60 cc for the CTA of the brain and neck Omnipaque 350 was injected intravenously without complication with 10 cc discarded.    CTA NECK:    After the intravenous power injection of non-ionic contrast material, serial thin sections were obtained through the cervical circulation on a multislice CT scanner.  Images were reformatted using a dedicated 3D software package and viewed on a dedicated workstation in multiple planes.      COMPARISON EXAMINATION: MRI brain, 2019, head CT, 2019, and prior    FINDINGS:    Ventricles and sulci remain minimally prominent, redemonstration bifrontal 6 mm right, 5 mm left subdural hygromas (2:10), without interval change from prior.    INTRA-AXIAL: No new intracranial mass, acute hemorrhage, or midline shift is present.redemonstration nonspecific white matter decreased attenuation likely microvascular disease.  EXTRA-AXIAL: No extra-axial fluid collection is present.  INTRACRANIAL HEMORRHAGE: No intracranial hemorrhage    VISUALIZED SINUSES: No air-fluid levels are identified.  VISUALIZED MASTOIDS:  Clear  CALVARIUM:  Intact  MISCELLANEOUS:  Endotracheal tube in situ, with retained secretions in the naso and oropharynx. Dental amalgam, lucency  along teeth in the mandible and maxilla, suggest dental inspection for likely treated and ongoing dental disease. Biapical parenchymal scarring, heterogeneous thyroid, ultrasound may better assess. Redemonstration degenerative change with rotary levocurvature and reversal of the cervical lordosis which may be positional with disc osteophyte ridge again most pronounced at the C5-6 level, and multilevel uncovertebral facet hypertrophy without change from prior.    CT RAPID PERFUSION:    INFARCT CORE: CBF <30%: 0 mL    TISSUE AT RISK:  Tmax > 6 s: 0 ml    MISMATCH RATIO: none      CTA [place] OF [place]:    ANTERIOR CIRCULATION    ICA  CAVERNOUS, SUPRACLINOID, BIFURCATION SEGMENTS: Patent without flow limiting stenosis.    ANTERIOR CEREBRAL ARTERIES: Bilateral A1, anterior communicating and A2 anterior cerebral arteries are unremarkable in course and caliber without flow limiting stenosis, there is a trifurcation of vessels arising from the anterior mid kidney artery, likely normal anatomic variation.    MIDDLE CEREBRAL ARTERIES: Patent bilateral M1, M2, and distal MCA branches without flow limiting stenosis.    POSTERIOR CIRCULATION:    VERTEBRAL ARTERIES: Patent without flow limiting stenosis    BASILAR ARTERY: Patent no flow limiting stenosis.    POSTERIOR CEREBRAL ARTERIES: Patent without flow limiting stenosis.    CTA NECK:    GREAT VESSELS: Visualized segments are patent, no flow limiting stenosis.    COMMON CAROTID ARTERIES:  RIGHT: Patent without flow limiting stenosis  LEFT: Patent without flow limiting stenosis    CAROTID BULBS:  RIGHT: Patent without flow limiting stenosis  LEFT: Patent without flow limiting stenosis    INTERNAL CAROTID ARTERIES:  RIGHT: Patent no evidencefor any hemodynamically significant stenosis at the ICA origin by NASCET criteria.  LEFT: Patent no evidence for any hemodynamically significant stenosis at the ICA origin by NASCET criteria.    VERTEBRAL ARTERIES:    RIGHT: Patent no evidence for any flow limiting stenosis.  LEFT: Patent no evidence for any flow limiting stenosis.    IMPRESSION:      HEAD CT AND RAPID PERFUSION:  HEAD CT: Mild volume loss, microvascular disease, no acute hemorrhage or midline shift.  CT PERFUSION demonstrated:  INFARCT CORE: CBF <30%: 0 mL, TISSUE AT RISK:  Tmax > 6 s: 0 ml, MISMATCH RATIO: none  If symptoms persist consider follow up head CT or MRI, MRA  if no contraindication.    CTA COW:  Patent intracranial circulation without flow limiting stenosis    CTA NECK: Patent, CCAs, ECAs, ICAs, no  hemodynamically significant stenosis at  ICA origins by NASCET criteria.  Bilateral vertebral arteries are patent without flow limiting stenosis.    Biapical scarring with slight patchy opacification, please see dedicated report of the chest for chest findings.    --- End of Report ---            YVETTE FERNANDEZ MD; Attending Radiologist  This document has been electronically signed. Oct 26 2021  2:38PM      CENTRAL LINE: N          RAGLAND: N                        A-LINE: N                           GLOBAL ISSUE/BEST PRACTICE  Analgesia: N  Sedation: N  CAM-ICU:   HOB elevation: yes  Stress ulcer prophylaxis: N   VTE prophylaxis: Heparin SC  Glycemic control: N  Nutrition: NPO    CODE STATUS: Full Code

## 2021-10-27 NOTE — DISCHARGE NOTE PROVIDER - CARE PROVIDER_API CALL
Amanda Villa (DO)  [place] [address]  Phone: [phone]  Fax: [phone]  Follow Up Time:

## 2021-10-27 NOTE — PROGRESS NOTE ADULT - ASSESSMENT
52yo Female w/ PMHx of anxiety, depression, anorexia presented to the ED in an obtunded state from unknown source found by , admitted for suspected toxic metabolic encephalopathy and acute respiratory failure w/ hypoxia and hypercapnia.

## 2021-10-27 NOTE — DISCHARGE NOTE PROVIDER - NSDCMRMEDTOKEN_GEN_ALL_CORE_FT
Ambien CR 12.5 mg oral tablet, extended release: 1 tab(s) orally once a day (at bedtime)  oxycodone-acetaminophen 5 mg-325 mg oral tablet: 1-2 tab(s) orally every 4-6 hours, As Needed  PROzac 20 mg oral capsule: 1 cap(s) orally once a day  mid day  Valium: 20 milligram(s) orally 2 times a day

## 2021-10-27 NOTE — DIETITIAN INITIAL EVALUATION ADULT. - OTHER INFO
GI/Intake:  -NPO status   -Last BM noted 10/26    Resp:   -Intubated in setting of acute respiratory failure   -Sedated - Ketamine  -Plan for extubation today     Weight Hx:  -Dosing weight, 95 pounds   -Per Stu HIE: 80-82 pounds (02/2020-10/2021)

## 2021-10-27 NOTE — PROGRESS NOTE ADULT - SUBJECTIVE AND OBJECTIVE BOX
SUSANA with clinical ATN. Less likely crystal nephropathy. No clinical suggestion of toxic alcohol. No urgent dialysis indicated Urine studies. D/w ICU

## 2021-10-27 NOTE — DIETITIAN INITIAL EVALUATION ADULT. - CHIEF COMPLAINT
51y Female with PMH of GERD, Diverticulitis. Pt with previous admission to [facility], s/p L distal radium ORIF (10/21/21). Pt readmitted on 10/26  to ICU; presenting with acute respiratory failure with hypoxia likely in the setting of drug overdose.

## 2021-10-27 NOTE — PROGRESS NOTE ADULT - SUBJECTIVE AND OBJECTIVE BOX
Follow up:  resp failure    HPI:  50yo Female w/ PMHx of anxiety, depression, anorexia presented to the ED in an obtunded state, was intubated in ED and transferred to ICU. HPI provided by patient's  Dakota [phone] at bedside.  Patient had a Left distal radius ORIF performed by Dr. Veliz in [place] on Thursday 10/21/2021 and was started on percocet for pain control. Patient was already on valium and ambien for her anxiety. Patients  Dakota, was in charge of administering her percocet, states she would take 2 tabs q6H. Patient was still in control of self-administering her valium and ambien. As per , patient was given her percocet today at around 12:00, however patient seemed "off" to the  as she was slurring her words. Patient denied taking anything or feeling different when questioned by .  then went to take a shower and returned 45 minutes later to find patient unresponsive to verbal or physical stimuli. Patient's pulse ox was measured at home to be in the 70s, upon which patient's  immediately called 911. In ambulance patient was given 4mg of narcan with no change. In ICU, patient was sedated with ketamine and kept on ventilator.      Remains intubated but she is now off pressors with sedation discontinued.  The plan is for hopeful extubation today. No new cardiac events overnight       PAST MEDICAL & SURGICAL HISTORY:  Anxiety    Endometriosis determined by laparoscopy    Strabismus    GERD (gastroesophageal reflux disease)    Diverticulitis  h/o    Osteoporosis    Lymphadenopathy    H/O alopecia    H/O heartburn    History of diverticulitis    Closed Colles&#x27; fracture of left radius with malunion, subsequent encounter    Endometriosis determined by laparoscopy  over 15 years ago    History of strabismus surgery  2x    S/P colon resection  july 2017    S/P appendectomy  july 2017        MEDICATIONS  (STANDING):  chlorhexidine 0.12% Liquid 15 milliLiter(s) Oral Mucosa every 12 hours  chlorhexidine 2% Cloths 1 Application(s) Topical <User Schedule>  chlorhexidine 4% Liquid 1 Application(s) Topical <User Schedule>  heparin   Injectable 5000 Unit(s) SubCutaneous every 12 hours  ketamine Infusion. 1 mG/kG/Hr (3.8 mL/Hr) IV Continuous <Continuous>    Vital Signs Last 24 Hrs  T(C): 37.1 (27 Oct 2021 08:00), Max: 37.1 (27 Oct 2021 08:00)  T(F): 98.8 (27 Oct 2021 08:00), Max: 98.8 (27 Oct 2021 08:00)  HR: 145 (27 Oct 2021 10:00) (93 - 145)  BP: 124/80 (27 Oct 2021 10:00) (85/52 - 995/-)  BP(mean): 97 (27 Oct 2021 10:00) (64 - 101)  RR: 25 (27 Oct 2021 10:00) (12 - 67)  SpO2: 97% (27 Oct 2021 10:00) (95% - 100%)    I&O's Summary    26 Oct 2021 07:01  -  27 Oct 2021 07:00  --------------------------------------------------------  IN: 1164.5 mL / OUT: 890 mL / NET: 274.5 mL    27 Oct 2021 07:01  -  27 Oct 2021 10:51  --------------------------------------------------------  IN: 56.4 mL / OUT: 0 mL / NET: 56.4 mL        PHYSICAL EXAM:    Constitutional: intubated  Eyes:   Pupils round, no lesions  ENMT: ETT  Pulmonary: sounds are clear bilaterally, No wheezing, rales or rhonchi  Cardiovascular: PMI not palpable RRR normal S1 and S2, no murmurs, rubs, gallops or clicks  Gastrointestinal: Bowel Sounds present, soft, nontender.   Lymph: No cervical lymphadenopathy.  Neurological:  no focal deficits  Skin: No rashes.  No cyanosis.  Psych: cannot assess  Ext: No lower ext edema      CARDIAC MARKERS ( 26 Oct 2021 21:12 )  x     / x     / 71 U/L / x     / x                                11.9   8.59  )-----------( 196      ( 27 Oct 2021 09:36 )             34.4     10-27    146<H>  |  109<H>  |  3<L>  ----------------------------<  146<H>  3.8   |  31  |  2.10<H>    Ca    8.1<L>      27 Oct 2021 09:36  Phos  4.7     10-26  Mg     1.4     10-26    TPro  6.1  /  Alb  2.8<L>  /  TBili  0.2  /  DBili  x   /  AST  23  /  ALT  22  /  AlkPhos  43  10-27    < from: 12 Lead ECG (10.15.21 @ 09:33) >    Ventricular Rate 65 BPM    Atrial Rate 65 BPM    P-R Interval 142 ms    QRS Duration 76 ms    Q-T Interval 402 ms    QTC Calculation(Bazett) 418 ms    P Axis 57 degrees    R Axis 80 degrees    T Axis 72 degrees    Diagnosis Line Normal sinus rhythm  Anteroseptal infarct (cited on or before 01-AUG-2015)  Abnormal ECG  When compared with ECG of 01-AUG-2015 08:57,  Questionable change in initial forces of Anterior leads  Confirmed by RUFINO DAVIS ([id number]) on 10/15/2021 5:47:50 PM    < end of copied text >    < from: Xray Chest 2 Views PA/Lat (12.11.19 @ 15:57) >    EXAM:  XR CHEST PA LAT 2V        PROCEDURE DATE:  12/11/2019           INTERPRETATION:  XR CHEST PA AND LATERAL    INDICATION: Pain, inflammatory osteoarthritis    COMPARISON: None    FINDINGS:    No consolidation, pleural effusion or pneumothorax.No pulmonary edema.    The cardiomediastinal silhouette is normal in size and contour.    IMPRESSION:    Clear lungs.                         EDDIE MENJIVAR M.D., ATTENDING RADIOLOGIST   This document has been electronically signed. Dec 11 2019  7:22PM                < end of copied text >

## 2021-10-27 NOTE — DIETITIAN INITIAL EVALUATION ADULT. - PATIENT PROFILE REVIEWED
Dad contacted. Pt with abdominal pain 3-4 days, per dad   Occasional   Vomiting, 1 episode this morning   1 episode of loose watery stool this morning.    Pt not reporting a specific location of pain  pt is not doubled-over in pain   Afebrile   Playful an yes

## 2021-10-27 NOTE — DISCHARGE NOTE PROVIDER - NSDCCPCAREPLAN_GEN_ALL_CORE_FT
PRINCIPAL DISCHARGE DIAGNOSIS  Diagnosis: Acute respiratory failure with hypoxia  Assessment and Plan of Treatment:       SECONDARY DISCHARGE DIAGNOSES  Diagnosis: SUSANA (acute kidney injury)  Assessment and Plan of Treatment:      PRINCIPAL DISCHARGE DIAGNOSIS  Diagnosis: Acute respiratory failure with hypoxia  Assessment and Plan of Treatment: You were admitted to the hospital due to respiratory failure likely secondary to overdose from medications and/or alcohol. You required intubation and were successfully extubated during your stay. Your respiratory status returned to normal prior to discharge. Please follow up with your PCP within 1 week of discharge.      SECONDARY DISCHARGE DIAGNOSES  Diagnosis: SUSANA (acute kidney injury)  Assessment and Plan of Treatment: Your kidney markers were elevated on admission to the hospital. Your kidney function improved with IV fluids during your stay. Please follow up with your PCP within 1 week of discharge for repeat bloodwork to monitor your kidney function.    Diagnosis: Major depression  Assessment and Plan of Treatment: You have a known history of anxiety/depression. Please continue your home medications Valium, Prozac and Ambien.       PRINCIPAL DISCHARGE DIAGNOSIS  Diagnosis: Acute respiratory failure with hypoxia  Assessment and Plan of Treatment: You were admitted to the ICU due to respiratory failure likely secondary to overdose from medications and/or alcohol. You required intubation and were successfully extubated during your stay. Your respiratory status returned to normal prior to discharge. Please follow up with your PCP within 1 week of discharge.      SECONDARY DISCHARGE DIAGNOSES  Diagnosis: SUSANA (acute kidney injury)  Assessment and Plan of Treatment: Your kidney markers were elevated on admission to the hospital. Nephrology followed you during your stay. Your kidney function improved with IV fluids. Please follow up with your PCP within 1 week of discharge for repeat bloodwork to monitor your kidney function.    Diagnosis: Major depression  Assessment and Plan of Treatment: You have a known history of anxiety/depression. Please continue your home medications Valium, Prozac and Ambien.

## 2021-10-27 NOTE — PROGRESS NOTE ADULT - SUBJECTIVE AND OBJECTIVE BOX
Patient is a 51y old  Female who presents with a chief complaint of unresponsiveness.      INTERVAL HPI/OVERNIGHT EVENTS: Ketamine was weaned off. Being trialed on pressure support on vent. Pt is intubated/sedated and cannot provide ROS.    MEDICATIONS  (STANDING):  chlorhexidine 0.12% Liquid 15 milliLiter(s) Oral Mucosa every 12 hours  chlorhexidine 2% Cloths 1 Application(s) Topical <User Schedule>  chlorhexidine 4% Liquid 1 Application(s) Topical <User Schedule>  heparin   Injectable 5000 Unit(s) SubCutaneous every 12 hours  sodium chloride 0.45%. 1000 milliLiter(s) (75 mL/Hr) IV Continuous <Continuous>    MEDICATIONS  (PRN):      Allergies    predniSONE (Unknown)  Tree Nuts (Hives; Other)    Intolerances        REVIEW OF SYSTEMS:  Pt is intubated/sedated and cannot provide ROS.    Vital Signs Last 24 Hrs  T(F): 98.6 (10-27-21 @ 11:00), Max: 98.8 (10-27-21 @ 08:00)  HR: 135 (10-27-21 @ 13:00) (93 - 145)  BP: 125/82 (10-27-21 @ 13:00) (85/52 - 995/-)  RR: 22 (10-27-21 @ 13:00) (12 - 67)  SpO2: 96% (10-27-21 @ 13:00) (95% - 100%)    PHYSICAL EXAM:  GENERAL: intubated/sedated, thin woman  HEENT:  anicteric, moist mucous membranes  CHEST/LUNG:  CTA b/l   HEART:  tachycardic to mid 120s, S1, S2  ABDOMEN:  BS+, soft, nondistended  EXTREMITIES: no edema, cyanosis  NERVOUS SYSTEM: intubated/sedated    LABS:                        11.9   8.59  )-----------( 196      ( 27 Oct 2021 09:36 )             34.4       10    146<H>  |  109<H>  |  3<L>  ----------------------------<  146<H>  3.8   |  31  |  2.10<H>    Ca    8.1<L>      27 Oct 2021 09:36  Phos  4.7     10-  Mg     1.4     10-    TPro  6.1  /  Alb  2.8<L>  /  TBili  0.2  /  DBili  x   /  AST  23  /  ALT  22  /  AlkPhos  43  10-27      Urinalysis Basic - ( 26 Oct 2021 15:48 )    Color: Yellow / Appearance: Clear / S.010 / pH: x  Gluc: x / Ketone: Moderate  / Bili: Small / Urobili: Negative   Blood: x / Protein: 15 / Nitrite: Negative   Leuk Esterase: Negative / RBC: Negative /HPF / WBC 0-2   Sq Epi: x / Non Sq Epi: Occasional / Bacteria: Negative      CAPILLARY BLOOD GLUCOSE            Culture - Blood (collected 10-26-21 @ 22:00)  Source: .Blood Blood  Preliminary Report (10-27-21 @ 23:01):    No growth to date.    Culture - Blood (collected 10-26-21 @ 22:00)  Source: .Blood Blood  Preliminary Report (10-27-21 @ 22:01):    No growth to date.        RADIOLOGY & ADDITIONAL TESTS:    Personally reviewed.     Consultant(s) Notes Reviewed:  [x] YES  [ ] NO     Patient is a 51y old  Female who presents with a chief complaint of unresponsiveness.       INTERVAL HPI/OVERNIGHT EVENTS: Ketamine was weaned off. Being trialed on pressure support on vent. Pt is intubated/sedated and cannot provide ROS.    MEDICATIONS  (STANDING):  chlorhexidine 0.12% Liquid 15 milliLiter(s) Oral Mucosa every 12 hours  chlorhexidine 2% Cloths 1 Application(s) Topical <User Schedule>  chlorhexidine 4% Liquid 1 Application(s) Topical <User Schedule>  heparin   Injectable 5000 Unit(s) SubCutaneous every 12 hours  sodium chloride 0.45%. 1000 milliLiter(s) (75 mL/Hr) IV Continuous <Continuous>    MEDICATIONS  (PRN):      Allergies    predniSONE (Unknown)  Tree Nuts (Hives; Other)    Intolerances        REVIEW OF SYSTEMS:  Pt is intubated/sedated and cannot provide ROS.    Vital Signs Last 24 Hrs  T(F): 98.6 (10-27-21 @ 11:00), Max: 98.8 (10-27-21 @ 08:00)  HR: 135 (10-27-21 @ 13:00) (93 - 145)  BP: 125/82 (10-27-21 @ 13:00) (85/52 - 995/-)  RR: 22 (10-27-21 @ 13:00) (12 - 67)  SpO2: 96% (10-27-21 @ 13:00) (95% - 100%)    PHYSICAL EXAM:  GENERAL: intubated/sedated, thin woman  HEENT:  anicteric, moist mucous membranes  CHEST/LUNG:  CTA b/l   HEART:  tachycardic to mid 120s, S1, S2  ABDOMEN:  BS+, soft, nondistended  EXTREMITIES: no edema, cyanosis  NERVOUS SYSTEM: intubated/sedated    LABS:                        11.9   8.59  )-----------( 196      ( 27 Oct 2021 09:36 )             34.4       10    146<H>  |  109<H>  |  3<L>  ----------------------------<  146<H>  3.8   |  31  |  2.10<H>    Ca    8.1<L>      27 Oct 2021 09:36  Phos  4.7     10-  Mg     1.4     10-    TPro  6.1  /  Alb  2.8<L>  /  TBili  0.2  /  DBili  x   /  AST  23  /  ALT  22  /  AlkPhos  43  10-27      Urinalysis Basic - ( 26 Oct 2021 15:48 )    Color: Yellow / Appearance: Clear / S.010 / pH: x  Gluc: x / Ketone: Moderate  / Bili: Small / Urobili: Negative   Blood: x / Protein: 15 / Nitrite: Negative   Leuk Esterase: Negative / RBC: Negative /HPF / WBC 0-2   Sq Epi: x / Non Sq Epi: Occasional / Bacteria: Negative      CAPILLARY BLOOD GLUCOSE            Culture - Blood (collected 10-26-21 @ 22:00)  Source: .Blood Blood  Preliminary Report (10-27-21 @ 23:01):    No growth to date.    Culture - Blood (collected 10-26-21 @ 22:00)  Source: .Blood Blood  Preliminary Report (10-27-21 @ 22:01):    No growth to date.        RADIOLOGY & ADDITIONAL TESTS:    Personally reviewed.     Consultant(s) Notes Reviewed:  [x] YES  [ ] NO

## 2021-10-27 NOTE — PROGRESS NOTE ADULT - ASSESSMENT
52yo F w/ PMH of anxiety, depression, anorexia, early menopause, osteoporosis, GERD, strabismus s/p 2 surgeries, endometriosis, diverticulitis s/p colon resection, admitted to ICU for overdose, cardio consulted for tachy and EKG changes.   suspected overdose, tachycardia  sinus tachy 2/2 metabolic disturbance, overdose likely polypharmacy in setting of deconditioning/anorexia   I-stop shows drug abuse  She is improved this morning was improved heart rate and no evidence for an acute cardiac process. She is now off pressors and plan for hopeful extubation today.   EKG sinus tachy 115, T wave changes in II III AVF which are nonspecific   high sensitivity trop neg x1, unlikely ACS     recommend  - hopeful extubation  - check echo   - Monitor and replete lytes, keep K>4, Mg>2.  - No meaningful evidence of volume overload.  - All other workup per ICU   - Will continue to follow.

## 2021-10-27 NOTE — PROGRESS NOTE ADULT - PROBLEM SELECTOR PLAN 1
Acute Respiratory failure with hypoxia and hypercapnia suspected to be due to toxic metabolic encephalopathy 2/2 suspected sedating drug overdose  - Patient on Percocet for pain for recent ORIF, also on Valium and Ambien at home for anxiety   - Intubated in ED, transferred to ICU   - Utox positive for benzos and opiates   - ABG pH 7.36/pCO2 58/HCO3 33/pO2 173/O2 sat 100%  - Lactate 4.4 -- now normalized   - brain imaging negative for hemorrhage, stenosis, or perfusion abnormality   - being trialed on pressure support on vent and weaned off sedation --- extubate when deemed safe by critical care team  - f/u cultures  - receiving some benzo periodically to decrease chance of withdrawal as pt was chronically taking valium 20mg po BID

## 2021-10-27 NOTE — PROGRESS NOTE ADULT - CRITICAL CARE SERVICES PROVIDED
Critical care services provided
Critical care services provided
I will STOP taking the medications listed below when I get home from the hospital:  None

## 2021-10-27 NOTE — PROCEDURE NOTE - ADDITIONAL PROCEDURE DETAILS
indications: acute hypoxic respiratory failure, toxic-metabolic encephalopathy    procedural time is non-inclusive of time spent during critical care encounter.

## 2021-10-27 NOTE — PROGRESS NOTE ADULT - ASSESSMENT
52yo Female w/ PMHx of anxiety, depression, anorexia presented to the ED in an obtunded state from unknown source found by , admitted for acute hypoxic/hypercarbic respiratory failure 2/2 to possible drug overdose and SUSANA. Patient was intubated in ED and transferred to ICU for care. Patient remains intubated today, weaned off of ketamine sedation, not on pressors.     Plan:  NEURO:  -Weaned off of ketamine sedation, patient not at mental baseline yet   -Will provide ativan prn to prevent benzodiazepine withdrawal       CV:  -Maintain goal MAP >65-70.  -Troponin negative. Trend Troponin/EKGs  -Keep K~4 and Mg>2 for optimal arrhythmia suppression.     RESP:  -Patient on CPAP w/ PS (5 Pressure support and 5 PEEP)  - Will consider extubating patient when she is fully awake and alert/capable of protecting her own airway     RENAL:  -Cr increased to 2.00 from 1.70 today, BUN remains <20   -Follow up urine lytes to r/o possible intrinsic SUSANA   -F/u osmolar gap to r/o possible isopropyl alcohol ingestion (patient had mildly elevated alcohol level on admission)     GI:  -NPO  -on 1/2 NS maintenance 75cc/hr     ENDO:  -glycemic control to limit FS glucose to <180mg/dl.    ID:  -F/u BloodCx/UrineCx  -SputumCx ordered. Will monitor off abx.   - Lactate remains elevated, started patient on maintenance fluids will trend lactate      HEME:  -DVT ppx w/ SC Heparin.     Updated Patient's spouse at bedside regarding the plan for patient's care/current interventions performed as well as labs to be ordered to further understand patient's condition.  50yo Female w/ PMHx of anxiety, depression, anorexia presented to the ED in an obtunded state from unknown source found by , admitted for acute hypoxic/hypercarbic respiratory failure 2/2 to possible drug overdose and SUSANA. Patient was intubated in ED and transferred to ICU for care. Patient remains intubated today, weaned off of ketamine sedation, not on pressors.     Plan:  NEURO:  -Weaned off of ketamine sedation, patient not at mental baseline yet   -Will provide ativan prn to prevent benzodiazepine withdrawal   -Serum ammonia wnl      CV:  -Maintain goal MAP >65-70.  -Troponin negative. Trend Troponin/EKGs  -Keep K~4 and Mg>2 for optimal arrhythmia suppression.     RESP:  -Patient on CPAP w/ PS (5 Pressure support and 5 PEEP)  - Will consider extubating patient when she is fully awake and alert/capable of protecting her own airway     RENAL:  -Cr increased to 2.00 from 1.70 today, BUN remains <20   -Follow up urine lytes to r/o possible intrinsic SUSANA   -F/u osmolar gap to r/o possible isopropyl alcohol ingestion (patient had mildly elevated alcohol level on admission)   -Salicylates negative     GI:  -NPO  -on 1/2 NS maintenance 75cc/hr     ENDO:  -glycemic control to limit FS glucose to <180mg/dl.    ID:  -F/u BloodCx/UrineCx  -SputumCx ordered. Will monitor off abx.   - Lactate remains elevated, started patient on maintenance fluids will trend lactate      HEME:  -DVT ppx w/ SC Heparin.     Updated Patient's spouse at bedside regarding the plan for patient's care/current interventions performed as well as labs to be ordered to further understand patient's condition.  52yo Female w/ PMHx of anxiety, depression, anorexia presented to the ED in an obtunded state from unknown source found by , admitted for acute hypoxic/hypercarbic respiratory failure 2/2 to possible drug overdose and SUSANA. Patient was intubated in ED and transferred to ICU for care. Patient remains intubated today, weaned off of ketamine sedation, not on pressors.     Plan:  NEURO:  -Weaned off of ketamine sedation, patient not at mental baseline yet   -Will provide ativan prn to prevent benzodiazepine withdrawal   -Serum ammonia wnl      CV:  -Maintain goal MAP >65-70.  -Troponin negative. Trend Troponin/EKGs  -Keep K~4 and Mg>2 for optimal arrhythmia suppression.     RESP:  -Patient on CPAP w/ PS (5 Pressure support and 5 PEEP)  - Will consider extubating patient when she is fully awake and alert/capable of protecting her own airway     RENAL:  -Cr increased to 2.00 from 1.70 today, BUN remains <20   -Follow up urine lytes to r/o possible intrinsic USSANA   -F/u osmolar gap to r/o possible isopropyl alcohol ingestion (patient had mildly elevated alcohol level on admission)   -Salicylates negative     GI:  -NPO  -on 1/2 NS maintenance 75cc/hr     ENDO:  -glycemic control to limit FS glucose to <180mg/dl.    ID:  -F/u BloodCx/UrineCx  -SputumCx ordered. Will monitor off abx.   - Lactate remains elevated, started patient on maintenance fluids will trend lactate      HEME:  -DVT ppx w/ SC Heparin.     Updated Patient's spouse at bedside regarding the plan for patient's care/current interventions performed as well as labs to be ordered to further understand patient's condition.     Tubes/Lines:   R. basilic vein 20 gauge angiocath   R. metacarpal 20 gauge angiocath   Dugan catheter

## 2021-10-27 NOTE — PROGRESS NOTE ADULT - SUBJECTIVE AND OBJECTIVE BOX
Time of evaluation: 1220    Called to evaluate patient for restraints        Other interventions attempted: de-escalation, orientation check, environment modification, medication assessment    REVIEW OF SYSTEMS:  CONSTITUTIONAL: [  ] fever   [  ] fatigue  EYES: [  ] visual disturbances  ENMT:  [  ]difficulty hearing  [  ] throat pain  RESPIRATORY:  [  ]cough  [   ] wheezing  [   ] hemoptysis [  ] shortness of breath  CARDIOVASCULAR: [  ]chest pain  [  ] palpitations   GASTROINTESTINAL: [  ]  abdominal pain [  ] nausea [  ] vomiting  [  ] diarrhea  [  ] constipation  GENITOURINARY: [  ] dysuria [  ] frequency  [  ] hematuria [  ] incontinence  NEUROLOGICAL: [  ] headaches [  ] new numbness  SKIN: [  ]itching [  ] new rash   MUSCULOSKELETAL: [  ] back pain  [  ] extremity pain  PSYCHIATRIC: [  ] depression  [  ] anxiety  [  ] mood swings [  ] difficulty sleeping  ALLERGY AND IMMUNOLOGIC: [  ] hives    [X]Unable to perfrom ROS due to:  [  ] ROS reviewed and all negative    PAST MEDICAL & SURGICAL HISTORY:  Anxiety    Endometriosis determined by laparoscopy    Strabismus    GERD (gastroesophageal reflux disease)    Diverticulitis  h/o    Osteoporosis    Lymphadenopathy    H/O alopecia    H/O heartburn    History of diverticulitis    Closed Colles&#x27; fracture of left radius with malunion, subsequent encounter    Endometriosis determined by laparoscopy  over 15 years ago    History of strabismus surgery  2x    S/P colon resection  july 2017    S/P appendectomy  july 2017      MEDICATIONS  (STANDING):  chlorhexidine 0.12% Liquid 15 milliLiter(s) Oral Mucosa every 12 hours  chlorhexidine 2% Cloths 1 Application(s) Topical <User Schedule>  chlorhexidine 4% Liquid 1 Application(s) Topical <User Schedule>  heparin   Injectable 5000 Unit(s) SubCutaneous every 12 hours  sodium chloride 0.45%. 1000 milliLiter(s) (75 mL/Hr) IV Continuous <Continuous>    MEDICATIONS  (PRN):                          11.9   8.59  )-----------( 196      ( 27 Oct 2021 09:36 )             34.4     10-27    146<H>  |  109<H>  |  3<L>  ----------------------------<  146<H>  3.8   |  31  |  2.10<H>    Ca    8.1<L>      27 Oct 2021 09:36  Phos  4.7     10-26  Mg     1.4     10-26    TPro  6.1  /  Alb  2.8<L>  /  TBili  0.2  /  DBili  x   /  AST  23  /  ALT  22  /  AlkPhos  43  10-27      Vital Signs Last 24 Hrs  T(C): 37 (27 Oct 2021 11:00), Max: 37.1 (27 Oct 2021 08:00)  T(F): 98.6 (27 Oct 2021 11:00), Max: 98.8 (27 Oct 2021 08:00)  HR: 126 (27 Oct 2021 12:32) (93 - 145)  BP: 124/81 (27 Oct 2021 11:00) (85/52 - 995/-)  BP(mean): 96 (27 Oct 2021 11:00) (64 - 101)  RR: 26 (27 Oct 2021 11:00) (12 - 67)  SpO2: 97% (27 Oct 2021 12:32) (95% - 100%)    Physical Examination:  General: Intubated. Mildly sedated but reaches for tube  with right hand   HEENT:  Symmetric.  PULM: CTA b/l   CVS: s1s2 tachy  Soft: soft BS nondistended   EXT: No edema, nontender  SKIN: Warm and well perfused.      [  ] Unable to perform physical exam due to    [X ] I have evaluated this patient and have determined that restraints are warranted to optimize medical care. Patient was assessed for current physical and psychological risk factors as well as special needs. There are no medical conditions or limitations that would place this patient at risk while in restraints.    Type of restraint: Rt  soft wrist restraints    Behavioral criteria for discontinuation of restraint: [ X ] See order    Attending MD Aware

## 2021-10-27 NOTE — DIETITIAN INITIAL EVALUATION ADULT. - PROBLEM SELECTOR PLAN 1
Acute Respiratory failure with hypoxia and toxic metabolic encephalopathy 2/2 to suspected drug overdose vs rubbing etoh ingestion vs idiopathic  - Patient on Percocet for pain for recent ORIF, also on Valium and Ambien at home for anxiety   - Intubated in ED, transferred to ICU   - Utox positive for benzos and opiates   - ABG pH 7.36/pCO2 58/HCO3 33/pO2 173/O2 sat 100%,  - Lactate 4.4   - Imaging negative for hemorrhage, stenosis, or perfusion abnormality   - Ventilator AC 16/350/5/35%, titrate to maintain SpO2 >92% and pH > 7.25  - As per ICU, f/u blood cx/urine cx/sputum cx, trend lactate, keep K>4, Mg>2

## 2021-10-27 NOTE — PHARMACOTHERAPY INTERVENTION NOTE - COMMENTS
Patient presented with possible overdose, sedated with ketamine infusion. Takes valium BID at home, discussed with ICU PA and Dr Hidalgo regarding dose conversion to IV ativan prn. Also mentioned possibility of utilizing IV valium if needed given longer duration of action and active metabolites

## 2021-10-27 NOTE — DISCHARGE NOTE PROVIDER - HOSPITAL COURSE
ADMISSION H+P:    HPI:  50yo Female w/ PMHx of anxiety, depression, anorexia presented to the ED in an obtunded state, was intubated in ED and transferred to ICU. HPI provided by patient's  Dakota [phone] at bedside.  Patient had a Left distal radius ORIF performed by Dr. Veliz in [place] on Thursday 10/21/2021 and was started on percocet for pain control. Patient was already on valium and ambien for her anxiety. Patients  Dakota, was in charge of administering her percocet, states she would take 2 tabs q6H. Patient was still in control of self-administering her valium and ambien. As per , patient was given her percocet today at around 12:00, however patient seemed "off" to the  as she was slurring her words. Patient denied taking anything or feeling different when questioned by .  then went to take a shower and returned 45 minutes later to find patient unresponsive to verbal or physical stimuli. Patient's pulse ox was measured at home to be in the 70s, upon which patient's  immediately called 911. In ambulance patient was given 4mg of narcan with no change. In ICU, patient was sedated with ketamine and kept on ventilator.      In ED   VS: 97.4F , 107/59, RR 12, 98% w/ BVM --> 100% on ventilator   Labs signifcant for: Cr 1.7, Lactate 4.6, ABG pH 7.36/pCO2 58/HCO3 33/pO2 173/O2 sat 100%, Utox positive for benzos and opiates   Head CT: negative for acute hemorrhage  CT perfusion: no tissue at risk, no mismatch   CTA Neck: No significant stenosis  S/p zosyn  (26 Oct 2021 17:29)      ---  HOSPITAL COURSE:     Patient was medically optimized and improved clinically throughout hospital course. Patient seen and examined on day of discharge.    Vital Signs  T(C): 37 (27 Oct 2021 11:00), Max: 37.1 (27 Oct 2021 08:00)  T(F): 98.6 (27 Oct 2021 11:00), Max: 98.8 (27 Oct 2021 08:00)  HR: 145 (27 Oct 2021 14:00) (93 - 145)  BP: 126/80 (27 Oct 2021 14:00) (85/52 - 126/80)  RR: 21 (27 Oct 2021 14:00) (16 - 67)  SpO2: 96% (27 Oct 2021 14:00) (95% - 100%)    Physical Exam:  General: well-developed, well-nourished, NAD  HEENT: normocephalic, atraumatic, EOMI, moist mucous membranes   Neck: supple, non-tender, no masses  Neurology: AAOx3, sensation intact  Respiratory: clear to auscultation bilaterally; no wheezes, rhonchi, or rales  CV: regular rate and rhythm, soft S1/S2, no murmurs, rubs, or gallops  Abdominal: soft, non-tender, non-distended, bowel sounds present  Extremities: no clubbing, cyanosis, or edema; palpable peripheral pulses  Musculoskeletal: no joint erythema or warmth, no joint swelling   Skin: warm, dry, normal color    Patient is medically stable for discharge to ____ with outpatient follow up.  ---  CONSULTANTS:     ---  TIME SPENT:   I, the attending physician, was physically present for the key portions of the evaluation and management (E/M) service provided. The total amount of time spent reviewing the hospital course, laboratory values, imaging findings, assessing/counseling the patient, discussing with consultant physicians, social work, nursing staff was -- minutes.     ---  FINAL DISCHARGE DIAGNOSIS LIST:  Please see last daily progress note for final discharge diagnoses    ---  Primary care provider was made aware of plan for discharge:  [    ] NO     [     ] YES       ADMISSION H+P:    HPI:  50yo Female w/ PMHx of anxiety, depression, anorexia presented to the ED in an obtunded state, was intubated in ED and transferred to ICU. HPI provided by patient's  Dakota [phone] at bedside.  Patient had a Left distal radius ORIF performed by Dr. Veliz in [place] on Thursday 10/21/2021 and was started on percocet for pain control. Patient was already on valium and ambien for her anxiety. Patients  Dakota, was in charge of administering her percocet, states she would take 2 tabs q6H. Patient was still in control of self-administering her valium and ambien. As per , patient was given her percocet today at around 12:00, however patient seemed "off" to the  as she was slurring her words. Patient denied taking anything or feeling different when questioned by .  then went to take a shower and returned 45 minutes later to find patient unresponsive to verbal or physical stimuli. Patient's pulse ox was measured at home to be in the 70s, upon which patient's  immediately called 911. In ambulance patient was given 4mg of narcan with no change. In ICU, patient was sedated with ketamine and kept on ventilator.      In ED   VS: 97.4F , 107/59, RR 12, 98% w/ BVM --> 100% on ventilator   Labs signifcant for: Cr 1.7, Lactate 4.6, ABG pH 7.36/pCO2 58/HCO3 33/pO2 173/O2 sat 100%, Utox positive for benzos and opiates   Head CT: negative for acute hemorrhage  CT perfusion: no tissue at risk, no mismatch   CTA Neck: No significant stenosis  S/p zosyn  (26 Oct 2021 17:29)      ---  HOSPITAL COURSE:   Patient was transferred to the ICU after being intubated in the ED. Patient was initially started on a ketamine drip for sedation while on VAC. As patient gradually regained control of her own breathing, she was changed to CPAP with PS and weaned off the ketamine infusion. Patient was given Ativan PRN to prevent benzodiazepine withdrawal. As patient regained her consciousness and was able to follow commands and show evidence of being able to protect her airways, she was extubated on________. Initial workup centered on identifying the substances that patient ingested that caused her episode of obtundedness. Workup revealed ________.   Patient was medically optimized and improved clinically throughout hospital course. Patient seen and examined on day of discharge.    Vital Signs  T(C): 37 (27 Oct 2021 11:00), Max: 37.1 (27 Oct 2021 08:00)  T(F): 98.6 (27 Oct 2021 11:00), Max: 98.8 (27 Oct 2021 08:00)  HR: 145 (27 Oct 2021 14:00) (93 - 145)  BP: 126/80 (27 Oct 2021 14:00) (85/52 - 126/80)  RR: 21 (27 Oct 2021 14:00) (16 - 67)  SpO2: 96% (27 Oct 2021 14:00) (95% - 100%)    Physical Exam:  General: well-developed, well-nourished, NAD  HEENT: normocephalic, atraumatic, EOMI, moist mucous membranes   Neck: supple, non-tender, no masses  Neurology: AAOx3, sensation intact  Respiratory: clear to auscultation bilaterally; no wheezes, rhonchi, or rales  CV: regular rate and rhythm, soft S1/S2, no murmurs, rubs, or gallops  Abdominal: soft, non-tender, non-distended, bowel sounds present  Extremities: no clubbing, cyanosis, or edema; palpable peripheral pulses  Musculoskeletal: no joint erythema or warmth, no joint swelling   Skin: warm, dry, normal color    Patient is medically stable for discharge to ____ with outpatient follow up.  ---  CONSULTANTS:     ---  TIME SPENT:   I, the attending physician, was physically present for the key portions of the evaluation and management (E/M) service provided. The total amount of time spent reviewing the hospital course, laboratory values, imaging findings, assessing/counseling the patient, discussing with consultant physicians, social work, nursing staff was -- minutes.     ---  FINAL DISCHARGE DIAGNOSIS LIST:  Please see last daily progress note for final discharge diagnoses    ---  Primary care provider was made aware of plan for discharge:  [    ] NO     [     ] YES       ADMISSION H+P:    HPI:  50yo Female w/ PMHx of anxiety, depression, anorexia presented to the ED in an obtunded state, was intubated in ED and transferred to ICU. HPI provided by patient's  Dakota [phone] at bedside.  Patient had a Left distal radius ORIF performed by Dr. Veliz in [place] on Thursday 10/21/2021 and was started on percocet for pain control. Patient was already on valium and ambien for her anxiety. Patients  Dakota, was in charge of administering her percocet, states she would take 2 tabs q6H. Patient was still in control of self-administering her valium and ambien. As per , patient was given her percocet today at around 12:00, however patient seemed "off" to the  as she was slurring her words. Patient denied taking anything or feeling different when questioned by .  then went to take a shower and returned 45 minutes later to find patient unresponsive to verbal or physical stimuli. Patient's pulse ox was measured at home to be in the 70s, upon which patient's  immediately called 911. In ambulance patient was given 4mg of narcan with no change. In ICU, patient was sedated with ketamine and kept on ventilator.      In ED   VS: 97.4F , 107/59, RR 12, 98% w/ BVM --> 100% on ventilator   Labs signifcant for: Cr 1.7, Lactate 4.6, ABG pH 7.36/pCO2 58/HCO3 33/pO2 173/O2 sat 100%, Utox positive for benzos and opiates   Head CT: negative for acute hemorrhage  CT perfusion: no tissue at risk, no mismatch   CTA Neck: No significant stenosis  S/p zosyn  (26 Oct 2021 17:29)      ---  HOSPITAL COURSE:   Patient was transferred to the ICU after being intubated in the ED. Patient was initially started on a ketamine drip for sedation while on VAC. As patient gradually regained control of her own breathing, she was changed to CPAP with PS and weaned off the ketamine infusion. As patient regained her consciousness and was able to follow commands and show evidence of being able to protect her airways, she was extubated on 10/28/2021. Patient was initially very confused and agitated after being extubated Initial workup centered on identifying the substances that patient ingested that caused her episode of obtundedness. Nephrology Dr. Dai was consulted to determine the need of a patient requiring dialysis, recommended that patient did not need dialysis. Workup revealed patient had an osmolar gap of 40 and an FeNA of 3.1%, indicating that the patient's SUSANA was most likely intrinsic in nature and could be 2/2 to isopropyl alcohol ingestion. Psychiatry Dr. Alvarenga was consulted to determine whether the patient was high risk for self-harm, was found not to be. Patient was restarted on her home regimens of valium 20mg BID, prozac 20mg qD, pantoprozol and a pain regimen of percocet 1 tab q6H for moderate pain and percocet 2 tabs q4H for severe pain.   Patient was medically optimized and improved clinically throughout hospital course. Patient seen and examined on day of discharge.    Vital Signs  T(C): 37 (27 Oct 2021 11:00), Max: 37.1 (27 Oct 2021 08:00)  T(F): 98.6 (27 Oct 2021 11:00), Max: 98.8 (27 Oct 2021 08:00)  HR: 145 (27 Oct 2021 14:00) (93 - 145)  BP: 126/80 (27 Oct 2021 14:00) (85/52 - 126/80)  RR: 21 (27 Oct 2021 14:00) (16 - 67)  SpO2: 96% (27 Oct 2021 14:00) (95% - 100%)    Physical Exam:  General: well-developed, well-nourished, NAD  HEENT: normocephalic, atraumatic, EOMI, moist mucous membranes   Neck: supple, non-tender, no masses  Neurology: AAOx3, sensation intact  Respiratory: clear to auscultation bilaterally; no wheezes, rhonchi, or rales  CV: regular rate and rhythm, soft S1/S2, no murmurs, rubs, or gallops  Abdominal: soft, non-tender, non-distended, bowel sounds present  Extremities: no clubbing, cyanosis, or edema; palpable peripheral pulses  Musculoskeletal: no joint erythema or warmth, no joint swelling   Skin: warm, dry, normal color    Patient is medically stable for discharge to ____ with outpatient follow up.  ---  CONSULTANTS:   Psychiatry - Dr. Alvarenga   ---  TIME SPENT:   I, the attending physician, was physically present for the key portions of the evaluation and management (E/M) service provided. The total amount of time spent reviewing the hospital course, laboratory values, imaging findings, assessing/counseling the patient, discussing with consultant physicians, social work, nursing staff was -- minutes.     ---  FINAL DISCHARGE DIAGNOSIS LIST:  Please see last daily progress note for final discharge diagnoses    ---  Primary care provider was made aware of plan for discharge:  [    ] NO     [     ] YES       ADMISSION H+P:    HPI:  50yo Female w/ PMHx of anxiety, depression, anorexia presented to the ED in an obtunded state, was intubated in ED and transferred to ICU. HPI provided by patient's  Dakota [phone] at bedside.  Patient had a Left distal radius ORIF performed by Dr. Veliz in [place] on Thursday 10/21/2021 and was started on percocet for pain control. Patient was already on valium and ambien for her anxiety. Patients  Dakota, was in charge of administering her percocet, states she would take 2 tabs q6H. Patient was still in control of self-administering her valium and ambien. As per , patient was given her percocet today at around 12:00, however patient seemed "off" to the  as she was slurring her words. Patient denied taking anything or feeling different when questioned by .  then went to take a shower and returned 45 minutes later to find patient unresponsive to verbal or physical stimuli. Patient's pulse ox was measured at home to be in the 70s, upon which patient's  immediately called 911. In ambulance patient was given 4mg of narcan with no change. In ICU, patient was sedated with ketamine and kept on ventilator.      In ED   VS: 97.4F , 107/59, RR 12, 98% w/ BVM --> 100% on ventilator   Labs signifcant for: Cr 1.7, Lactate 4.6, ABG pH 7.36/pCO2 58/HCO3 33/pO2 173/O2 sat 100%, Utox positive for benzos and opiates   Head CT: negative for acute hemorrhage  CT perfusion: no tissue at risk, no mismatch   CTA Neck: No significant stenosis  S/p zosyn  (26 Oct 2021 17:29)      ---  HOSPITAL COURSE:   Patient was transferred to the ICU after being intubated in the ED. Patient was initially started on a ketamine drip for sedation while on VAC. As patient gradually regained control of her own breathing, she was changed to CPAP with PS and weaned off the ketamine infusion. As patient regained her consciousness and was able to follow commands and show evidence of being able to protect her airways, she was extubated on 10/28/2021. Patient was initially very confused and agitated after being extubated. Initial workup centered on identifying the substances that patient ingested that caused her episode of obtundedness. Nephrology Dr. Dai was consulted to determine the need of a patient requiring dialysis, recommended that patient did not need dialysis. Workup revealed patient had an osmolar gap of 40 and an FeNA of 3.1%, indicating that the patient's SUSANA was most likely intrinsic in nature and could be 2/2 to isopropyl alcohol ingestion. Psychiatry Dr. Alvarenga was consulted to determine whether the patient was high risk for self-harm, was found not to be. Patient was restarted on her home regimens of valium 20mg BID, prozac 20mg qD, pantoprazole and a pain regimen of percocet 1 tab q6H for moderate pain and percocet 2 tabs q4H for severe pain. Patient was medically optimized and improved clinically throughout hospital course. Patient seen and examined on day of discharge.    ICU Vital Signs Last 24 Hrs  T(C): 37.1 (29 Oct 2021 06:40), Max: 37.1 (28 Oct 2021 18:01)  T(F): 98.8 (29 Oct 2021 06:40), Max: 98.8 (28 Oct 2021 20:13)  HR: 100 (29 Oct 2021 07:00) (79 - 115)  BP: 111/70 (29 Oct 2021 06:40) (111/66 - 147/87)  BP(mean): 85 (29 Oct 2021 06:40) (85 - 108)  RR: 20 (29 Oct 2021 07:00) (14 - 38)  SpO2: 96% (29 Oct 2021 07:00) (93% - 98%)    Physical Exam:  General: NAD  HEENT: normocephalic, atraumatic  Neck: supple, non-tender  Neurology: AAOx3  Respiratory: clear to auscultation bilaterally; no wheezes, rhonchi, or rales  CV: regular rate and rhythm, soft S1/S2, no murmurs, rubs, or gallops  Abdominal: soft, non-tender, non-distended, bowel sounds present  Extremities: no clubbing, cyanosis, or edema; palpable peripheral pulses  Musculoskeletal: no joint erythema or warmth  Skin: warm, dry    Patient is medically stable for discharge to home with outpatient follow up.  ---  CONSULTANTS:   Psychiatry: Dr. Alvarenga   Nephrology: Dr. Kennedy  Cardiology: [facility]  ---  TIME SPENT:   I, the attending physician, was physically present for the key portions of the evaluation and management (E/M) service provided. The total amount of time spent reviewing the hospital course, laboratory values, imaging findings, assessing/counseling the patient, discussing with consultant physicians, social work, nursing staff was 75 minutes.     ---  FINAL DISCHARGE DIAGNOSIS LIST:  Please see last daily progress note for final discharge diagnoses

## 2021-10-28 DIAGNOSIS — J96.01 ACUTE RESPIRATORY FAILURE WITH HYPOXIA: ICD-10-CM

## 2021-10-28 DIAGNOSIS — N17.9 ACUTE KIDNEY FAILURE, UNSPECIFIED: ICD-10-CM

## 2021-10-28 LAB
ALBUMIN SERPL ELPH-MCNC: 2.5 G/DL — LOW (ref 3.3–5)
ALP SERPL-CCNC: 39 U/L — LOW (ref 40–120)
ALT FLD-CCNC: 19 U/L — SIGNIFICANT CHANGE UP (ref 12–78)
ANION GAP SERPL CALC-SCNC: 7 MMOL/L — SIGNIFICANT CHANGE UP (ref 5–17)
APTT BLD: 30.8 SEC — SIGNIFICANT CHANGE UP (ref 27.5–35.5)
AST SERPL-CCNC: 26 U/L — SIGNIFICANT CHANGE UP (ref 15–37)
BASE EXCESS BLDA CALC-SCNC: 8.3 MMOL/L — HIGH (ref -2–3)
BASOPHILS # BLD AUTO: 0.03 K/UL — SIGNIFICANT CHANGE UP (ref 0–0.2)
BASOPHILS NFR BLD AUTO: 0.3 % — SIGNIFICANT CHANGE UP (ref 0–2)
BILIRUB SERPL-MCNC: 0.3 MG/DL — SIGNIFICANT CHANGE UP (ref 0.2–1.2)
BLOOD GAS COMMENTS ARTERIAL: SIGNIFICANT CHANGE UP
BUN SERPL-MCNC: 6 MG/DL — LOW (ref 7–23)
CALCIUM SERPL-MCNC: 8 MG/DL — LOW (ref 8.5–10.1)
CHLORIDE SERPL-SCNC: 106 MMOL/L — SIGNIFICANT CHANGE UP (ref 96–108)
CHLORIDE UR-SCNC: 124 MMOL/L — SIGNIFICANT CHANGE UP
CO2 SERPL-SCNC: 31 MMOL/L — SIGNIFICANT CHANGE UP (ref 22–31)
CREAT ?TM UR-MCNC: 45 MG/DL — SIGNIFICANT CHANGE UP
CREAT SERPL-MCNC: 1.4 MG/DL — HIGH (ref 0.5–1.3)
EOSINOPHIL # BLD AUTO: 0 K/UL — SIGNIFICANT CHANGE UP (ref 0–0.5)
EOSINOPHIL NFR BLD AUTO: 0 % — SIGNIFICANT CHANGE UP (ref 0–6)
EOSINOPHIL NFR URNS MANUAL: NEGATIVE — SIGNIFICANT CHANGE UP
GAS PNL BLDA: SIGNIFICANT CHANGE UP
GLUCOSE SERPL-MCNC: 124 MG/DL — HIGH (ref 70–99)
HCO3 BLDA-SCNC: 33 MMOL/L — HIGH (ref 21–28)
HCT VFR BLD CALC: 29.4 % — LOW (ref 34.5–45)
HGB BLD-MCNC: 10.2 G/DL — LOW (ref 11.5–15.5)
HOROWITZ INDEX BLDA+IHG-RTO: 50 — SIGNIFICANT CHANGE UP
IMM GRANULOCYTES NFR BLD AUTO: 0.5 % — SIGNIFICANT CHANGE UP (ref 0–1.5)
INR BLD: 0.96 RATIO — SIGNIFICANT CHANGE UP (ref 0.88–1.16)
LYMPHOCYTES # BLD AUTO: 1.32 K/UL — SIGNIFICANT CHANGE UP (ref 1–3.3)
LYMPHOCYTES # BLD AUTO: 15.1 % — SIGNIFICANT CHANGE UP (ref 13–44)
MAGNESIUM SERPL-MCNC: 2 MG/DL — SIGNIFICANT CHANGE UP (ref 1.6–2.6)
MCHC RBC-ENTMCNC: 33.6 PG — SIGNIFICANT CHANGE UP (ref 27–34)
MCHC RBC-ENTMCNC: 34.7 GM/DL — SIGNIFICANT CHANGE UP (ref 32–36)
MCV RBC AUTO: 96.7 FL — SIGNIFICANT CHANGE UP (ref 80–100)
MONOCYTES # BLD AUTO: 0.85 K/UL — SIGNIFICANT CHANGE UP (ref 0–0.9)
MONOCYTES NFR BLD AUTO: 9.7 % — SIGNIFICANT CHANGE UP (ref 2–14)
NEUTROPHILS # BLD AUTO: 6.49 K/UL — SIGNIFICANT CHANGE UP (ref 1.8–7.4)
NEUTROPHILS NFR BLD AUTO: 74.4 % — SIGNIFICANT CHANGE UP (ref 43–77)
NRBC # BLD: 0 /100 WBCS — SIGNIFICANT CHANGE UP (ref 0–0)
OSMOLALITY UR: 457 MOSM/KG — SIGNIFICANT CHANGE UP (ref 50–1200)
PCO2 BLDA: 55 MMHG — HIGH (ref 32–35)
PH BLDA: 7.39 — SIGNIFICANT CHANGE UP (ref 7.35–7.45)
PHOSPHATE SERPL-MCNC: 3 MG/DL — SIGNIFICANT CHANGE UP (ref 2.5–4.5)
PLATELET # BLD AUTO: 152 K/UL — SIGNIFICANT CHANGE UP (ref 150–400)
PO2 BLDA: 120 MMHG — HIGH (ref 83–108)
POTASSIUM SERPL-MCNC: 3.2 MMOL/L — LOW (ref 3.5–5.3)
POTASSIUM SERPL-SCNC: 3.2 MMOL/L — LOW (ref 3.5–5.3)
POTASSIUM UR-SCNC: 51.3 MMOL/L — SIGNIFICANT CHANGE UP
PROT ?TM UR-MCNC: 19 MG/DL — HIGH (ref 0–12)
PROT SERPL-MCNC: 5.8 G/DL — LOW (ref 6–8.3)
PROTHROM AB SERPL-ACNC: 11.3 SEC — SIGNIFICANT CHANGE UP (ref 10.6–13.6)
RBC # BLD: 3.04 M/UL — LOW (ref 3.8–5.2)
RBC # FLD: 11.9 % — SIGNIFICANT CHANGE UP (ref 10.3–14.5)
SAO2 % BLDA: 100 % — HIGH (ref 94–98)
SODIUM SERPL-SCNC: 144 MMOL/L — SIGNIFICANT CHANGE UP (ref 135–145)
SODIUM UR-SCNC: 120 MMOL/L — SIGNIFICANT CHANGE UP
T3 SERPL-MCNC: 56 NG/DL — LOW (ref 80–200)
T4 AB SER-ACNC: 3 UG/DL — LOW (ref 4.6–12)
TSH SERPL-MCNC: 0.51 UIU/ML — SIGNIFICANT CHANGE UP (ref 0.36–3.74)
UUN UR-MCNC: 200 MG/DL — SIGNIFICANT CHANGE UP
WBC # BLD: 8.73 K/UL — SIGNIFICANT CHANGE UP (ref 3.8–10.5)
WBC # FLD AUTO: 8.73 K/UL — SIGNIFICANT CHANGE UP (ref 3.8–10.5)

## 2021-10-28 PROCEDURE — 73110 X-RAY EXAM OF WRIST: CPT | Mod: 26,RT

## 2021-10-28 PROCEDURE — 99221 1ST HOSP IP/OBS SF/LOW 40: CPT

## 2021-10-28 PROCEDURE — 99233 SBSQ HOSP IP/OBS HIGH 50: CPT

## 2021-10-28 PROCEDURE — 99233 SBSQ HOSP IP/OBS HIGH 50: CPT | Mod: GC

## 2021-10-28 RX ORDER — DIAZEPAM 5 MG
20 TABLET ORAL
Refills: 0 | Status: DISCONTINUED | OUTPATIENT
Start: 2021-10-28 | End: 2021-10-28

## 2021-10-28 RX ORDER — HYDROMORPHONE HYDROCHLORIDE 2 MG/ML
1 INJECTION INTRAMUSCULAR; INTRAVENOUS; SUBCUTANEOUS ONCE
Refills: 0 | Status: DISCONTINUED | OUTPATIENT
Start: 2021-10-28 | End: 2021-10-28

## 2021-10-28 RX ORDER — POTASSIUM CHLORIDE 20 MEQ
40 PACKET (EA) ORAL EVERY 4 HOURS
Refills: 0 | Status: COMPLETED | OUTPATIENT
Start: 2021-10-28 | End: 2021-10-28

## 2021-10-28 RX ORDER — ZOLPIDEM TARTRATE 10 MG/1
5 TABLET ORAL ONCE
Refills: 0 | Status: DISCONTINUED | OUTPATIENT
Start: 2021-10-28 | End: 2021-10-28

## 2021-10-28 RX ORDER — PANTOPRAZOLE SODIUM 20 MG/1
40 TABLET, DELAYED RELEASE ORAL
Refills: 0 | Status: DISCONTINUED | OUTPATIENT
Start: 2021-10-28 | End: 2021-10-29

## 2021-10-28 RX ORDER — DIAZEPAM 5 MG
10 TABLET ORAL ONCE
Refills: 0 | Status: DISCONTINUED | OUTPATIENT
Start: 2021-10-28 | End: 2021-10-28

## 2021-10-28 RX ORDER — DIAZEPAM 5 MG
20 TABLET ORAL
Refills: 0 | Status: DISCONTINUED | OUTPATIENT
Start: 2021-10-28 | End: 2021-10-29

## 2021-10-28 RX ORDER — FLUOXETINE HCL 10 MG
20 CAPSULE ORAL DAILY
Refills: 0 | Status: DISCONTINUED | OUTPATIENT
Start: 2021-10-28 | End: 2021-10-29

## 2021-10-28 RX ORDER — ZOLPIDEM TARTRATE 10 MG/1
5 TABLET ORAL AT BEDTIME
Refills: 0 | Status: DISCONTINUED | OUTPATIENT
Start: 2021-10-28 | End: 2021-10-29

## 2021-10-28 RX ORDER — OXYCODONE AND ACETAMINOPHEN 5; 325 MG/1; MG/1
2 TABLET ORAL EVERY 4 HOURS
Refills: 0 | Status: DISCONTINUED | OUTPATIENT
Start: 2021-10-28 | End: 2021-10-29

## 2021-10-28 RX ORDER — SODIUM CHLORIDE 9 MG/ML
1000 INJECTION, SOLUTION INTRAVENOUS
Refills: 0 | Status: DISCONTINUED | OUTPATIENT
Start: 2021-10-28 | End: 2021-10-28

## 2021-10-28 RX ORDER — DIAZEPAM 5 MG
20 TABLET ORAL ONCE
Refills: 0 | Status: DISCONTINUED | OUTPATIENT
Start: 2021-10-28 | End: 2021-10-28

## 2021-10-28 RX ORDER — HYDROMORPHONE HYDROCHLORIDE 2 MG/ML
0.5 INJECTION INTRAMUSCULAR; INTRAVENOUS; SUBCUTANEOUS ONCE
Refills: 0 | Status: DISCONTINUED | OUTPATIENT
Start: 2021-10-28 | End: 2021-10-28

## 2021-10-28 RX ORDER — OXYCODONE AND ACETAMINOPHEN 5; 325 MG/1; MG/1
1 TABLET ORAL EVERY 6 HOURS
Refills: 0 | Status: DISCONTINUED | OUTPATIENT
Start: 2021-10-28 | End: 2021-10-29

## 2021-10-28 RX ADMIN — ZOLPIDEM TARTRATE 5 MILLIGRAM(S): 10 TABLET ORAL at 23:22

## 2021-10-28 RX ADMIN — Medication 40 MILLIEQUIVALENT(S): at 12:39

## 2021-10-28 RX ADMIN — HYDROMORPHONE HYDROCHLORIDE 0.5 MILLIGRAM(S): 2 INJECTION INTRAMUSCULAR; INTRAVENOUS; SUBCUTANEOUS at 01:50

## 2021-10-28 RX ADMIN — OXYCODONE AND ACETAMINOPHEN 2 TABLET(S): 5; 325 TABLET ORAL at 10:39

## 2021-10-28 RX ADMIN — OXYCODONE AND ACETAMINOPHEN 2 TABLET(S): 5; 325 TABLET ORAL at 22:53

## 2021-10-28 RX ADMIN — Medication 40 MILLIEQUIVALENT(S): at 10:09

## 2021-10-28 RX ADMIN — Medication 20 MILLIGRAM(S): at 12:39

## 2021-10-28 RX ADMIN — PANTOPRAZOLE SODIUM 40 MILLIGRAM(S): 20 TABLET, DELAYED RELEASE ORAL at 10:09

## 2021-10-28 RX ADMIN — OXYCODONE AND ACETAMINOPHEN 2 TABLET(S): 5; 325 TABLET ORAL at 17:19

## 2021-10-28 RX ADMIN — CHLORHEXIDINE GLUCONATE 1 APPLICATION(S): 213 SOLUTION TOPICAL at 05:33

## 2021-10-28 RX ADMIN — Medication 10 MILLIGRAM(S): at 02:02

## 2021-10-28 RX ADMIN — ZOLPIDEM TARTRATE 5 MILLIGRAM(S): 10 TABLET ORAL at 02:31

## 2021-10-28 RX ADMIN — ZOLPIDEM TARTRATE 5 MILLIGRAM(S): 10 TABLET ORAL at 22:22

## 2021-10-28 RX ADMIN — HYDROMORPHONE HYDROCHLORIDE 1 MILLIGRAM(S): 2 INJECTION INTRAMUSCULAR; INTRAVENOUS; SUBCUTANEOUS at 04:20

## 2021-10-28 RX ADMIN — HYDROMORPHONE HYDROCHLORIDE 1 MILLIGRAM(S): 2 INJECTION INTRAMUSCULAR; INTRAVENOUS; SUBCUTANEOUS at 04:04

## 2021-10-28 RX ADMIN — Medication 20 MILLIGRAM(S): at 03:04

## 2021-10-28 RX ADMIN — Medication 20 MILLIGRAM(S): at 21:32

## 2021-10-28 RX ADMIN — OXYCODONE AND ACETAMINOPHEN 2 TABLET(S): 5; 325 TABLET ORAL at 21:53

## 2021-10-28 RX ADMIN — HYDROMORPHONE HYDROCHLORIDE 0.5 MILLIGRAM(S): 2 INJECTION INTRAMUSCULAR; INTRAVENOUS; SUBCUTANEOUS at 02:38

## 2021-10-28 RX ADMIN — HEPARIN SODIUM 5000 UNIT(S): 5000 INJECTION INTRAVENOUS; SUBCUTANEOUS at 17:19

## 2021-10-28 RX ADMIN — SODIUM CHLORIDE 75 MILLILITER(S): 9 INJECTION, SOLUTION INTRAVENOUS at 01:28

## 2021-10-28 RX ADMIN — OXYCODONE AND ACETAMINOPHEN 2 TABLET(S): 5; 325 TABLET ORAL at 10:09

## 2021-10-28 RX ADMIN — Medication 20 MILLIGRAM(S): at 15:09

## 2021-10-28 NOTE — CONSULT NOTE ADULT - ASSESSMENT
SUSANA  Drug Overdose  Hypokalemia  Respiratory Failure    -0.7  -SUSANA likely 2/2 hypoperfusion  -Urine lytes reviewed  -Creatinine improving  -Extubated  -Dugan, can likely do voiding trial when patient more ambulatory  -Lactic Acid improving

## 2021-10-28 NOTE — PROGRESS NOTE ADULT - SUBJECTIVE AND OBJECTIVE BOX
Patient is a 51y old  Female who presents with a chief complaint of unresponsiveness.       INTERVAL HPI/OVERNIGHT EVENTS: Pt extubated overnight. Pt states she is feeling better. Admits pain in her left arm as per baseline before admission. Denies fever, chills, SOB, CP, cough.     MEDICATIONS  (STANDING):  chlorhexidine 2% Cloths 1 Application(s) Topical <User Schedule>  chlorhexidine 4% Liquid 1 Application(s) Topical <User Schedule>  FLUoxetine 20 milliGRAM(s) Oral daily  heparin   Injectable 5000 Unit(s) SubCutaneous every 12 hours  pantoprazole    Tablet 40 milliGRAM(s) Oral before breakfast  zolpidem 5 milliGRAM(s) Oral at bedtime    MEDICATIONS  (PRN):  diazepam    Tablet 20 milliGRAM(s) Oral two times a day PRN anxiety  oxycodone    5 mG/acetaminophen 325 mG 1 Tablet(s) Oral every 6 hours PRN Moderate Pain (4 - 6)  oxycodone    5 mG/acetaminophen 325 mG 2 Tablet(s) Oral every 4 hours PRN Severe Pain (7 - 10)      Allergies    predniSONE (Unknown)  Tree Nuts (Hives; Other)    Intolerances        REVIEW OF SYSTEMS:  CONSTITUTIONAL: denies fever or chills  HEENT:  No headache, no sore throat  RESPIRATORY: No cough, wheezing, or shortness of breath  CARDIOVASCULAR: No chest pain, palpitations  GASTROINTESTINAL: No abd pain, nausea, vomiting, or diarrhea  GENITOURINARY: No dysuria, frequency, or hematuria  NEUROLOGICAL: no focal weakness or dizziness  MUSCULOSKELETAL: admits pain in left UE    Vital Signs Last 24 Hrs  T(C): 38.1 (28 Oct 2021 04:00), Max: 38.1 (28 Oct 2021 04:00)  T(F): 100.6 (28 Oct 2021 04:00), Max: 100.6 (28 Oct 2021 04:00)  HR: 112 (28 Oct 2021 15:00) (98 - 144)  BP: 131/82 (28 Oct 2021 13:00) (110/80 - 147/87)  BP(mean): 92 (28 Oct 2021 13:00) (86 - 109)  RR: 35 (28 Oct 2021 15:00) (14 - 61)  SpO2: 95% (28 Oct 2021 15:00) (92% - 99%)    PHYSICAL EXAM:  GENERAL: NAD at rest  HEENT:  anicteric, moist mucous membranes  CHEST/LUNG:  CTA b/l, no rales, wheezes, or rhonchi  HEART:  tachycardic to 108bpm, S1, S2  ABDOMEN:  BS+, soft, nontender, nondistended  EXTREMITIES: no edema, cyanosis, or calf tenderness ; left wrist/forearm surgically wrapped - clean/dry/intact  NERVOUS SYSTEM: answers questions and follows commands appropriately    LABS:               10.2   8.73  )-----------( 152      ( 28 Oct 2021 06:03 )             29.4     10-28    144  |  106  |  6<L>  ----------------------------<  124<H>  3.2<L>   |  31  |  1.40<H>    Ca    8.0<L>      28 Oct 2021 06:03  Phos  3.0     10-28  Mg     2.0     10-28    TPro  5.8<L>  /  Alb  2.5<L>  /  TBili  0.3  /  DBili  x   /  AST  26  /  ALT  19  /  AlkPhos  39<L>  10-28    PT/INR - ( 28 Oct 2021 06:03 )   PT: 11.3 sec;   INR: 0.96 ratio         PTT - ( 28 Oct 2021 06:03 )  PTT:30.8 sec    CAPILLARY BLOOD GLUCOSE            Culture - Blood (collected 10-26-21 @ 22:00)  Source: .Blood Blood  Preliminary Report (10-27-21 @ 23:01):    No growth to date.    Culture - Blood (collected 10-26-21 @ 22:00)  Source: .Blood Blood  Preliminary Report (10-27-21 @ 22:01):    No growth to date.        RADIOLOGY & ADDITIONAL TESTS:    Personally reviewed.     Consultant(s) Notes Reviewed:  [x] YES  [ ] NO

## 2021-10-28 NOTE — PROGRESS NOTE ADULT - SUBJECTIVE AND OBJECTIVE BOX
Neurology Follow up note    NIKKI CAMPOSADFERWMX35yBragry    HPI:  50yo Female w/ PMHx of anxiety, depression, anorexia presented to the ED in an obtunded state, was intubated in ED and transferred to ICU. HPI provided by patient's  Dakota [phone] at bedside.  Patient had a Left distal radius ORIF performed by Dr. Veliz in [place] on Thursday 10/21/2021 and was started on percocet for pain control. Patient was already on valium and ambien for her anxiety. Patients  Dakota, was in charge of administering her percocet, states she would take 2 tabs q6H. Patient was still in control of self-administering her valium and ambien. As per , patient was given her percocet today at around 12:00, however patient seemed "off" to the  as she was slurring her words. Patient denied taking anything or feeling different when questioned by .  then went to take a shower and returned 45 minutes later to find patient unresponsive to verbal or physical stimuli. Patient's pulse ox was measured at home to be in the 70s, upon which patient's  immediately called 911. In ambulance patient was given 4mg of narcan with no change. In ICU, patient was sedated with ketamine and kept on ventilator.      In ED   VS: 97.4F , 107/59, RR 12, 98% w/ BVM --> 100% on ventilator   Labs signifcant for: Cr 1.7, Lactate 4.6, ABG pH 7.36/pCO2 58/HCO3 33/pO2 173/O2 sat 100%, Utox positive for benzos and opiates   Head CT: negative for acute hemorrhage  CT perfusion: no tissue at risk, no mismatch   CTA Neck: No significant stenosis  S/p zosyn  (26 Oct 2021 17:29)      Interval History -mental status better    Patient is seen, chart was reviewed and case was discussed with the treatment team.  Pt is not in any distress.   Lying on bed comfortably.       Vital Signs Last 24 Hrs  T(C): 38.1 (28 Oct 2021 04:00), Max: 38.1 (28 Oct 2021 04:00)  T(F): 100.6 (28 Oct 2021 04:00), Max: 100.6 (28 Oct 2021 04:00)  HR: 112 (28 Oct 2021 15:00) (98 - 144)  BP: 131/82 (28 Oct 2021 13:00) (110/80 - 147/87)  BP(mean): 92 (28 Oct 2021 13:00) (86 - 109)  RR: 35 (28 Oct 2021 15:00) (14 - 61)  SpO2: 95% (28 Oct 2021 15:00) (92% - 99%)        REVIEW OF SYSTEMS:    Constitutional: No fever,   Eyes: No eye pain, visual disturbances, or discharge  ENT:  No difficulty hearing, tinnitus, vertigo; No sinus or throat pain  Neck: No pain or stiffness  Respiratory: No cough, wheezing, chills or hemoptysis  Cardiovascular: No chest pain, palpitations, s  Gastrointestinal: No abdominal or epigastric pain. No nausea, vomiting  Genitourinary: No dysuria, frequency, hematuria or incontinence  Neurological: No headaches,  loss of strength, numbness or tremors  Psychiatric: No dmood swings or difficulty sleepin  Skin: No itching, burning, rashes or lesions   Lymph Nodes: No enlarged glands  Endocrine: No heat or cold intolerance; No hair loss,   Allergy and Immunologic: No hives or eczema    On Neurological Examination:    Mental Status - Pt is alert, awake,. Follows commands well and able to answer questions appropriately.Mood and affect  normal    Speech -  Normal.     Cranial Nerves - Pupils 3 mm equal and reactive to light, extraocular eye movements intact. Pt has no visual field deficit.  Pt has no facial asymmetry. Facial sensation is intact.Tongue - is in midline.    Muscle tone - is normal      Motor Exam - 5/5 except LUE    Sensory Exam -  Pt withdraws all extremities equally on stimulation. No asymmetry seen.       coordination:    Finger to nose: normal/ right      Deep tendon Reflexes - 2 plus all over.       Neck Supple -  Yes.     MEDICATIONS    chlorhexidine 2% Cloths 1 Application(s) Topical <User Schedule>  chlorhexidine 4% Liquid 1 Application(s) Topical <User Schedule>  diazepam    Tablet 20 milliGRAM(s) Oral two times a day PRN  FLUoxetine 20 milliGRAM(s) Oral daily  heparin   Injectable 5000 Unit(s) SubCutaneous every 12 hours  lactated ringers. 1000 milliLiter(s) IV Continuous <Continuous>  oxycodone    5 mG/acetaminophen 325 mG 1 Tablet(s) Oral every 6 hours PRN  oxycodone    5 mG/acetaminophen 325 mG 2 Tablet(s) Oral every 4 hours PRN  pantoprazole    Tablet 40 milliGRAM(s) Oral before breakfast  zolpidem 5 milliGRAM(s) Oral at bedtime      Allergies    predniSONE (Unknown)  Tree Nuts (Hives; Other)    Intolerances        LABS:  CBC Full  -  ( 28 Oct 2021 06:03 )  WBC Count : 8.73 K/uL  RBC Count : 3.04 M/uL  Hemoglobin : 10.2 g/dL    Auto Neutrophil % : 74.4 %  Auto Lymphocyte % : 15.1 %  Auto Monocyte % : 9.7 %  Auto Eosinophil % : 0.0 %  Auto Basophil % : 0.3 %      10-28    144  |  106  |  6<L>  ----------------------------<  124<H>  3.2<L>   |  31  |  1.40<H>    Ca    8.0<L>      28 Oct 2021 06:03  Phos  3.0     10-28  Mg     2.0     10-28    TPro  5.8<L>  /  Alb  2.5<L>  /  TBili  0.3  /  DBili  x   /  AST  26  /  ALT  19  /  AlkPhos  39<L>  10-28    Hemoglobin A1C:     Vitamin B12     RADIOLOGY    ASSESSMENT AND PLAN:      SEEN FOR AMS RELATED TO DRUG OVERDOSE   MENTAL STATUS BETTER    RESUME BENZO  Physical therapy evaluation.  OOB to chair/ambulation with assistance only.  Pain is accessed and addressed.  Plan of care was discussed with family. Questions answered.  Would continue to follow.

## 2021-10-28 NOTE — PROGRESS NOTE ADULT - PROBLEM SELECTOR PLAN 1
Acute Respiratory failure with hypoxia and hypercapnia suspected to be due to toxic metabolic encephalopathy 2/2 suspected sedating drug overdose  - Patient on Percocet for pain for recent ORIF, also on Valium and Ambien at home for anxiety   - Intubated in ED, transferred to ICU   - Utox positive for benzos and opiates   - ABG pH 7.36/pCO2 58/HCO3 33/pO2 173/O2 sat 100%  - Lactate 4.4 -- now normalized   - brain imaging negative for hemorrhage, stenosis, or perfusion abnormality   - pt successfully extubated early this morning  - blood cultures NGTD  - receiving home benzo to decrease chance of withdrawal as pt was chronically taking valium 20mg po BID  - psych (Lyle), recs appreciated -- no need for inpt psych, likely was accidental overdose ; no SI Acute Respiratory failure with hypoxia and hypercapnia suspected to be due to toxic metabolic encephalopathy 2/2 suspected sedating drug overdose  - Patient on Percocet for pain for recent ORIF, also on Valium and Ambien at home for anxiety   - Intubated in ED, transferred to ICU   - Utox positive for benzos and opiates   - ABG pH 7.36/pCO2 58/HCO3 33/pO2 173/O2 sat 100%  - Lactate 4.4 -- now normalized   - brain imaging negative for hemorrhage, stenosis, or perfusion abnormality   - pt successfully extubated early this morning  - blood cultures NGTD  - receiving home benzo to decrease chance of withdrawal as pt was chronically taking valium 20mg po BID  - psych (Lyle), recs appreciated -- no need for inpt psych, likely was accidental overdose ; no SI  - neuro (Caroline), recs appreciated

## 2021-10-28 NOTE — PROGRESS NOTE ADULT - SUBJECTIVE AND OBJECTIVE BOX
Patient is a 51y old  Female who presents with a chief complaint of Overdose (28 Oct 2021 01:24)    24 hour events: Patient extubated overnight yesterday, was initially on venturi mask to assist w/ oxygenation, now on room air. As per night team, patient was very confused and agitated upon extubation, cursed and refused to cooperate with staff, was reportedly upset that she was not receiving a "purple pill" that she takes everyday as apart of her home medications. Chart review yielded the possible pill to be dexlansoprazole. Patient complained of diffuse body aches, calmed down after receiving 30mg of valium, 0.5mg of dilaudid, and 5mg of ambien. Patient initially had NGT placed overnight, was removed. Patient was seen and examined at bedside in the AM, was still mildly confused as to the context of her ICU admission, but was overall much calmer and cooperative.     REVIEW OF SYSTEMS      T(F): 100.6 (10-28-21 @ 04:00), Max: 100.6 (10-28-21 @ 04:00)  HR: 103 (10-28-21 @ 11:00) (103 - 145)  BP: 122/87 (10-28-21 @ 11:00) (110/80 - 147/87)  RR: 29 (10-28-21 @ 11:00) (14 - 61)  SpO2: 95% (10-28-21 @ 11:00) (92% - 99%)  Wt(kg): --    Mode: AC/ CMV (Assist Control/ Continuous Mandatory Ventilation), RR (machine): 16, TV (machine): 350, FiO2: 30, PEEP: 5        I&O's Summary    10-27 @ 07:01  -  10-28 @ 07:00  --------------------------------------------------------  IN: 2556.4 mL / OUT: 1825 mL / NET: 731.4 mL      PHYSICAL EXAM      MEDICATIONS          diazepam    Tablet Oral  FLUoxetine Oral  oxycodone    5 mG/acetaminophen 325 mG Oral PRN  oxycodone    5 mG/acetaminophen 325 mG Oral PRN  zolpidem Oral      heparin   Injectable SubCutaneous    pantoprazole    Tablet Oral      lactated ringers. IV Continuous      chlorhexidine 2% Cloths Topical  chlorhexidine 4% Liquid Topical                            10.2   8.73  )-----------( 152      ( 28 Oct 2021 06:03 )             29.4       10-28    144  |  106  |  6<L>  ----------------------------<  124<H>  3.2<L>   |  31  |  1.40<H>    Ca    8.0<L>      28 Oct 2021 06:03  Phos  3.0     10-28  Mg     2.0     10-28    TPro  5.8<L>  /  Alb  2.5<L>  /  TBili  0.3  /  DBili  x   /  AST  26  /  ALT  19  /  AlkPhos  39<L>  10-28    Lactate 1.1           10-27 @ 14:23      CARDIAC MARKERS ( 26 Oct 2021 21:12 )  x     / x     / 71 U/L / x     / x          PT/INR - ( 28 Oct 2021 06:03 )   PT: 11.3 sec;   INR: 0.96 ratio         PTT - ( 28 Oct 2021 06:03 )  PTT:30.8 sec  Urinalysis Basic - ( 26 Oct 2021 15:48 )    Color: Yellow / Appearance: Clear / S.010 / pH: x  Gluc: x / Ketone: Moderate  / Bili: Small / Urobili: Negative   Blood: x / Protein: 15 / Nitrite: Negative   Leuk Esterase: Negative / RBC: Negative /HPF / WBC 0-2   Sq Epi: x / Non Sq Epi: Occasional / Bacteria: Negative      .Blood Blood   No growth to date. -- 10-26 @ 22:00        Radiology: < from: Xray Wrist 3 Views, Right (10.28.21 @ 04:45) >    EXAM:  XR WRIST COMP MIN 3 VIEWS RT                          EXAM:  XR CHEST PORTABLE IMMED 1V                            PROCEDURE DATE:  10/27/2021          INTERPRETATION:  AP semierect chest on 2021 at at 8:06 PM. 2 images. NG tube repositioned.    Heart size is within normal limits. Endotracheal tube remains with tip 1.5 cm above the sommer.    There is a sizable density now seen over the right upper outer lung field new since . Exact significance uncertain. Conceivably is extraneous.    NG tube is now in good position in the stomach.    Right wrist. Patient has local pain. 3 views. There is an IV in the dorsum. Bony structures unremarkable.    IMPRESSION: Good position of NG tube. Density now seen over the right upper outer lung. It could be extraneous.    Right wrist unremarkable.    --- End of Report ---            AINSLEY WHITE MD; Attending Radiologist  This document has been electronically signed. Oct 28 2021 10:04AM        CENTRAL LINE: Y/N          DATE INSERTED:              REMOVE: Y/N  RAGLAND: Y/N                        DATE INSERTED:              REMOVE: Y/N  A-LINE: Y/N                       DATE INSERTED:              REMOVE: Y/N    CENTRAL LINE: N          RAGLAND: N                        A-LINE: N                           GLOBAL ISSUE/BEST PRACTICE  Analgesia: Y  Sedation: N  CAM-ICU:   HOB elevation: yes  Stress ulcer prophylaxis: Y   VTE prophylaxis: Heparin SC  Glycemic control: N  Nutrition: regular diet     CODE STATUS: Full Code        Patient is a 51y old  Female who presents with a chief complaint of Overdose (28 Oct 2021 01:24)    24 hour events: Patient extubated overnight yesterday, was initially on venturi mask to assist w/ oxygenation, now on room air. As per night team, patient was very confused and agitated upon extubation, cursed and refused to cooperate with staff, was reportedly upset that she was not receiving a "purple pill" that she takes everyday as apart of her home medications. Chart review yielded the possible pill to be dexlansoprazole. Patient complained of diffuse body aches, calmed down after receiving 30mg of valium, 0.5mg of dilaudid, and 5mg of ambien. Patient initially had NGT placed overnight, was removed. Patient was seen and examined at bedside in the AM, was still mildly confused as to the context of her ICU admission, but was overall much calmer and cooperative.     REVIEW OF SYSTEMS  HEENT: Patient admits that she has "alternating" vision, is unable to focus on one object with both eyes at the same time, only with one eye     T(F): 100.6 (10-28-21 @ 04:00), Max: 100.6 (10-28-21 @ 04:00)  HR: 103 (10-28-21 @ 11:00) (103 - 145)  BP: 122/87 (10-28-21 @ 11:00) (110/80 - 147/87)  RR: 29 (10-28-21 @ 11:00) (14 - 61)  SpO2: 95% (10-28-21 @ :00) (92% - 99%)  Wt(kg): --    Mode: AC/ CMV (Assist Control/ Continuous Mandatory Ventilation), RR (machine): 16, TV (machine): 350, FiO2: 30, PEEP: 5        I&O's Summary    10-27 @ 07:01  -  10-28 @ 07:00  --------------------------------------------------------  IN: 2556.4 mL / OUT: 1825 mL / NET: 731.4 mL      PHYSICAL EXAM  Gen: Thin female, NAD   Neuro: Awake and alert, oriented to person and place not time  CV: s1,s2, tachycardic, no rubs, murmurs or gallops   Pulm: CTA b/l, no wheezes, rales, or rhonchi   GI: soft, nd,   Extremities: no clubbing, cyanosis, or edema     MEDICATIONS          diazepam    Tablet Oral  FLUoxetine Oral  oxycodone    5 mG/acetaminophen 325 mG Oral PRN  oxycodone    5 mG/acetaminophen 325 mG Oral PRN  zolpidem Oral      heparin   Injectable SubCutaneous    pantoprazole    Tablet Oral      lactated ringers. IV Continuous      chlorhexidine 2% Cloths Topical  chlorhexidine 4% Liquid Topical                            10.2   8.73  )-----------( 152      ( 28 Oct 2021 06:03 )             29.4       10-28    144  |  106  |  6<L>  ----------------------------<  124<H>  3.2<L>   |  31  |  1.40<H>    Ca    8.0<L>      28 Oct 2021 06:03  Phos  3.0     10-28  Mg     2.0     10-28    TPro  5.8<L>  /  Alb  2.5<L>  /  TBili  0.3  /  DBili  x   /  AST  26  /  ALT  19  /  AlkPhos  39<L>  10-28    Lactate 1.1           10-27 @ 14:23      CARDIAC MARKERS ( 26 Oct 2021 21:12 )  x     / x     / 71 U/L / x     / x          PT/INR - ( 28 Oct 2021 06:03 )   PT: 11.3 sec;   INR: 0.96 ratio         PTT - ( 28 Oct 2021 06:03 )  PTT:30.8 sec  Urinalysis Basic - ( 26 Oct 2021 15:48 )    Color: Yellow / Appearance: Clear / S.010 / pH: x  Gluc: x / Ketone: Moderate  / Bili: Small / Urobili: Negative   Blood: x / Protein: 15 / Nitrite: Negative   Leuk Esterase: Negative / RBC: Negative /HPF / WBC 0-2   Sq Epi: x / Non Sq Epi: Occasional / Bacteria: Negative      .Blood Blood   No growth to date. -- 10-26 @ 22:00        Radiology: < from: Xray Wrist 3 Views, Right (10.28.21 @ 04:45) >    EXAM:  XR WRIST COMP MIN 3 VIEWS RT                          EXAM:  XR CHEST PORTABLE IMMED 1V                            PROCEDURE DATE:  10/27/2021          INTERPRETATION:  AP semierect chest on 2021 at at 8:06 PM. 2 images. NG tube repositioned.    Heart size is within normal limits. Endotracheal tube remains with tip 1.5 cm above the sommer.    There is a sizable density now seen over the right upper outer lung field new since . Exact significance uncertain. Conceivably is extraneous.    NG tube is now in good position in the stomach.    Right wrist. Patient has local pain. 3 views. There is an IV in the dorsum. Bony structures unremarkable.    IMPRESSION: Good position of NG tube. Density now seen over the right upper outer lung. It could be extraneous.    Right wrist unremarkable.    --- End of Report ---            AINSLEY WHITE MD; Attending Radiologist  This document has been electronically signed. Oct 28 2021 10:04AM        CENTRAL LINE: Y/N          DATE INSERTED:              REMOVE: Y/N  RAGLAND: Y/N                        DATE INSERTED:              REMOVE: Y/N  A-LINE: Y/N                       DATE INSERTED:              REMOVE: Y/N    CENTRAL LINE: N          RAGLAND: N                        A-LINE: N                           GLOBAL ISSUE/BEST PRACTICE  Analgesia: Y  Sedation: N  CAM-ICU:   HOB elevation: yes  Stress ulcer prophylaxis: Y   VTE prophylaxis: Heparin SC  Glycemic control: N  Nutrition: regular diet     CODE STATUS: Full Code        Patient is a 51y old  Female who presents with a chief complaint of Overdose (28 Oct 2021 01:24)    24 hour events: Patient extubated early this am when she awoke severely agitated, was initially on venturi mask to assist w/ oxygenation, now on room air. As per night team, patient was very confused and agitated upon extubation, cursed and refused to cooperate with staff, was reportedly upset that she was not receiving a "purple pill" that she takes everyday as apart of her home medications. Chart review yielded the possible pill to be dexlansoprazole. Patient complained of diffuse body aches, calmed down after receiving 30mg of valium, 0.5mg of dilaudid, and 5mg of ambien. Patient was seen and examined at bedside in the AM, was still mildly confused as to the context of her ICU admission, but was overall much calmer and cooperative.     REVIEW OF SYSTEMS  HEENT: Patient admits that she has "alternating" vision, is unable to focus on one object with both eyes at the same time, only with one eye     T(F): 100.6 (10-28-21 @ 04:00), Max: 100.6 (10-28-21 @ 04:00)  HR: 103 (10-28-21 @ 11:00) (103 - 145)  BP: 122/87 (10-28-21 @ 11:00) (110/80 - 147/87)  RR: 29 (10-28-21 @ 11:00) (14 - 61)  SpO2: 95% (10-28-21 @ 11:00) (92% - 99%)  Wt(kg): --    Mode: AC/ CMV (Assist Control/ Continuous Mandatory Ventilation), RR (machine): 16, TV (machine): 350, FiO2: 30, PEEP: 5        I&O's Summary    10-27 @ 07:01  -  10-28 @ 07:00  --------------------------------------------------------  IN: 2556.4 mL / OUT: 1825 mL / NET: 731.4 mL      PHYSICAL EXAM  Gen: Thin female, NAD   Neuro: Awake and alert, oriented to person and place not time  CV: s1,s2, tachycardic, no rubs, murmurs or gallops   Pulm: CTA b/l, no wheezes, rales, or rhonchi   GI: soft, nd,   Extremities: no clubbing, cyanosis, or edema     MEDICATIONS          diazepam    Tablet Oral  FLUoxetine Oral  oxycodone    5 mG/acetaminophen 325 mG Oral PRN  oxycodone    5 mG/acetaminophen 325 mG Oral PRN  zolpidem Oral      heparin   Injectable SubCutaneous    pantoprazole    Tablet Oral      lactated ringers. IV Continuous      chlorhexidine 2% Cloths Topical  chlorhexidine 4% Liquid Topical                            10.2   8.73  )-----------( 152      ( 28 Oct 2021 06:03 )             29.4       10-28    144  |  106  |  6<L>  ----------------------------<  124<H>  3.2<L>   |  31  |  1.40<H>    Ca    8.0<L>      28 Oct 2021 06:03  Phos  3.0     10-28  Mg     2.0     10-28    TPro  5.8<L>  /  Alb  2.5<L>  /  TBili  0.3  /  DBili  x   /  AST  26  /  ALT  19  /  AlkPhos  39<L>  10-28    Lactate 1.1           10-27 @ 14:23      CARDIAC MARKERS ( 26 Oct 2021 21:12 )  x     / x     / 71 U/L / x     / x          PT/INR - ( 28 Oct 2021 06:03 )   PT: 11.3 sec;   INR: 0.96 ratio         PTT - ( 28 Oct 2021 06:03 )  PTT:30.8 sec  Urinalysis Basic - ( 26 Oct 2021 15:48 )    Color: Yellow / Appearance: Clear / S.010 / pH: x  Gluc: x / Ketone: Moderate  / Bili: Small / Urobili: Negative   Blood: x / Protein: 15 / Nitrite: Negative   Leuk Esterase: Negative / RBC: Negative /HPF / WBC 0-2   Sq Epi: x / Non Sq Epi: Occasional / Bacteria: Negative      .Blood Blood   No growth to date. -- 10-26 @ 22:00        Radiology: < from: Xray Wrist 3 Views, Right (10.28.21 @ 04:45) >    EXAM:  XR WRIST COMP MIN 3 VIEWS RT                          EXAM:  XR CHEST PORTABLE IMMED 1V                            PROCEDURE DATE:  10/27/2021          INTERPRETATION:  AP semierect chest on 2021 at at 8:06 PM. 2 images. NG tube repositioned.    Heart size is within normal limits. Endotracheal tube remains with tip 1.5 cm above the sommer.    There is a sizable density now seen over the right upper outer lung field new since . Exact significance uncertain. Conceivably is extraneous.    NG tube is now in good position in the stomach.    Right wrist. Patient has local pain. 3 views. There is an IV in the dorsum. Bony structures unremarkable.    IMPRESSION: Good position of NG tube. Density now seen over the right upper outer lung. It could be extraneous.    Right wrist unremarkable.    --- End of Report ---            AINSLEY WHITE MD; Attending Radiologist  This document has been electronically signed. Oct 28 2021 10:04AM        CENTRAL LINE: Y/N          DATE INSERTED:              REMOVE: Y/N  RAGLAND: Y/N                        DATE INSERTED:              REMOVE: Y/N  A-LINE: Y/N                       DATE INSERTED:              REMOVE: Y/N    CENTRAL LINE: N          RAGLAND: N                        A-LINE: N                           GLOBAL ISSUE/BEST PRACTICE  Analgesia: Y  Sedation: N  CAM-ICU:   HOB elevation: yes  Stress ulcer prophylaxis: Y   VTE prophylaxis: Heparin SC  Glycemic control: N  Nutrition: regular diet     CODE STATUS: Full Code

## 2021-10-28 NOTE — BH CONSULTATION LIAISON ASSESSMENT NOTE - RISK ASSESSMENT
Acute Suicide Risk  (  ) High   (x  ) Moderate   (  ) Low   (  ) Unable to determine   Rationale ___________moderate to low risk. not reporting any SI however has made provocative statements about cutting wrist when feeling desperate that she will not have her benzo's and this may be a possibility now that her family is aware that she is obtaining them illegally. pt firmly denies any intent to end her life, has a supportive family and children to live for. she has no h/o SA or SIB     Elevated Chronic Risk   ( x ) Yes ___________substance abuse with poor insight is a chronic risk factor  Details ___________  (  ) No   ___________     Safety Plan   Details: ___________  [  ] Safety plan discussed with patient  [  ] Education provided regarding environmental safety / lethal means restriction  [  ] Provision of National Suicide Prevention Lifeline 3-146-980-TALK (1779) Patient denies suicidal ideation, is future-oriented, has good social support

## 2021-10-28 NOTE — BH CONSULTATION LIAISON ASSESSMENT NOTE - NSBHMSERECMEM_PSY_A_CORE
Order Information     Date Department Ordering Authorizing   4/29/2015 [facility] Cheryl Riley RN Laine Persaud, DO   Medication Detail      Disp Refills Start End    cyclobenzaprine (FLEXERIL) 10 MG tablet 30 tablet 0 4/29/2015     Sig - Route: Take 1 tablet by mouth 3 times daily as needed for Muscle spasms. - Oral    Class: Eprescribe    E-Prescribing Status: Receipt confirmed by pharmacy (4/29/2015 11:59 AM CDT)      Last seen: 1/10/17  Future: 9/29/17  Spoke with patient as hasn't had filled in awhile. Patient states that occasionally she has back spasms and tries to do without, but sometimes needs these. States that she maybe takes about 1 per month.    Okay for refill?    Impaired

## 2021-10-28 NOTE — PROGRESS NOTE ADULT - SUBJECTIVE AND OBJECTIVE BOX
Patient is a 51y old  Female who presents with a chief complaint of Overdose (27 Oct 2021 18:46)      BRIEF HOSPITAL COURSE: PER EMR: 50 yo female, PMHx of anxiety, depression, anorexia, who presented to the ED in an obtunded state, was intubated in ED and transferred to ICU. HPI provided by patient's  Dakota [phone] at bedside. Patient had a Left distal radius ORIF performed by Dr. Veliz in [place] on Thursday 10/21/2021 and was started on percocet for pain control. Patient was already on valium and ambien for her anxiety. Patients  Dakota, was in charge of administering her percocet, states she would take 2 tabs q6H. Patient was still in control of self-administering her valium and ambien. As per , patient was given her percocet today at around 12:00, however patient seemed "off" to the  as she was slurring her words. Patient denied taking anything or feeling different when questioned by .  then went to take a shower and returned 45 minutes later to find patient unresponsive to verbal or physical stimuli. Patient's pulse ox was measured at home to be in the 70s, upon which patient's  immediately called 911. In ambulance patient was given 4mg of narcan with no change. In ICU, patient was sedated with ketamine and kept on ventilator.        Events last 24 hours: Encephalopathy slowly improving over the course of the night, able to communicate pain in left wrist. Acutely awoke, agitated, dyscoordinate with ventilator. Tachypneic, but admitted to acute anxiety. Placed on SBT and extubated to 50% VM.        PAST MEDICAL & SURGICAL HISTORY:  Anxiety    Endometriosis determined by laparoscopy    Strabismus    GERD (gastroesophageal reflux disease)    Diverticulitis  h/o    Osteoporosis    Lymphadenopathy    H/O alopecia    H/O heartburn    History of diverticulitis    Closed Colles&#x27; fracture of left radius with malunion, subsequent encounter    Endometriosis determined by laparoscopy  over 15 years ago    History of strabismus surgery  2x    S/P colon resection  2017    S/P appendectomy  2017            SOCIAL HISTORY: uato due to ams        Review of Systems: +anxiety, pain in Left wrist. Due to altered mental status/intubation, subjective information was not able to be obtained from the patient. History was obtained, to the extent possible, from review of the chart and collateral sources of information.        Medications:  acetaminophen    Suspension .. 650 milliGRAM(s) Oral every 6 hours PRN  diazepam    Tablet 10 milliGRAM(s) Oral two times a day  FLUoxetine 20 milliGRAM(s) Oral daily  oxyCODONE    IR 5 milliGRAM(s) Oral every 4 hours PRN  oxyCODONE    IR 10 milliGRAM(s) Oral every 4 hours PRN  heparin   Injectable 5000 Unit(s) SubCutaneous every 12 hours  sodium chloride 0.45%. 1000 milliLiter(s) IV Continuous <Continuous>  chlorhexidine 0.12% Liquid 15 milliLiter(s) Oral Mucosa every 12 hours  chlorhexidine 2% Cloths 1 Application(s) Topical <User Schedule>  chlorhexidine 4% Liquid 1 Application(s) Topical <User Schedule>        Mode: AC/ CMV (Assist Control/ Continuous Mandatory Ventilation)  RR (machine): 16  TV (machine): 350  FiO2: 30  PEEP: 5  ITime: 1  MAP: 9  PIP: 16      ICU Vital Signs Last 24 Hrs  T(C): 37.8 (27 Oct 2021 23:00), Max: 37.8 (27 Oct 2021 23:00)  T(F): 100 (27 Oct 2021 23:00), Max: 100 (27 Oct 2021 23:00)  HR: 130 (28 Oct 2021 01:00) (109 - 145)  BP: 134/93 (28 Oct 2021 01:00) (108/74 - 134/93)  BP(mean): 109 (28 Oct 2021 01:00) (85 - 109)  ABP: --  ABP(mean): --  RR: 41 (28 Oct 2021 01:00) (16 - 41)  SpO2: 98% (28 Oct 2021 01:00) (95% - 99%)      ABG - ( 27 Oct 2021 08:11 )  pH, Arterial: 7.42  pH, Blood: x     /  pCO2: 52    /  pO2: 109   / HCO3: 34    / Base Excess: 9.2   /  SaO2: 99.5                I&O's Detail    26 Oct 2021 07:01  -  27 Oct 2021 07:00  --------------------------------------------------------  IN:    IV PiggyBack: 100 mL    Ketamine: 64.5 mL    Lactated Ringers Bolus: 1000 mL  Total IN: 1164.5 mL    OUT:    Indwelling Catheter - Urethral (mL): 575 mL    Voided (mL): 345 mL  Total OUT: 920 mL    Total NET: 244.5 mL      27 Oct 2021 07:01  -  28 Oct 2021 01:24  --------------------------------------------------------  IN:    IV PiggyBack: 50 mL    Jevity 1.5: 150 mL    Ketamine: 6.4 mL    Lactated Ringers Bolus: 1000 mL    sodium chloride 0.45%: 975 mL  Total IN: 2181.4 mL    OUT:    Indwelling Catheter - Urethral (mL): 1025 mL  Total OUT: 1025 mL    Total NET: 1156.4 mL            LABS:                        11.9   8.59  )-----------( 196      ( 27 Oct 2021 09:36 )             34.4     10-27    145  |  108  |  4<L>  ----------------------------<  152<H>  3.7   |  31  |  1.90<H>    Ca    8.2<L>      27 Oct 2021 14:23  Phos  4.7     10-26  Mg     1.4     10-26    TPro  6.1  /  Alb  2.8<L>  /  TBili  0.2  /  DBili  x   /  AST  23  /  ALT  22  /  AlkPhos  43  10-27      CARDIAC MARKERS ( 26 Oct 2021 21:12 )  x     / x     / 71 U/L / x     / x          CAPILLARY BLOOD GLUCOSE      POCT Blood Glucose.: 127 mg/dL (26 Oct 2021 14:02)    PT/INR - ( 27 Oct 2021 09:36 )   PT: 12.2 sec;   INR: 1.04 ratio         PTT - ( 27 Oct 2021 09:36 )  PTT:30.6 sec  Urinalysis Basic - ( 26 Oct 2021 15:48 )    Color: Yellow / Appearance: Clear / S.010 / pH: x  Gluc: x / Ketone: Moderate  / Bili: Small / Urobili: Negative   Blood: x / Protein: 15 / Nitrite: Negative   Leuk Esterase: Negative / RBC: Negative /HPF / WBC 0-2   Sq Epi: x / Non Sq Epi: Occasional / Bacteria: Negative      CULTURES:  Culture Results:   No growth to date. (10-26 @ 22:00)  Culture Results:   No growth to date. (10-26 @ 22:00)            Physical Examination:    General: Acute distress due to anxiety    HEENT: Pupils equal, reactive to light. Symmetric.    PULM: Clear to auscultation bilaterally, no significant sputum production    CVS: Sinus tachycardia    ABD: Soft, nondistended, nontender, normoactive bowel sounds    EXT: Left forearm cast    SKIN: Warm and well perfused, no rashes noted.    NEURO: Alert, interactive        RADIOLOGY: prior images reviewed         INVASIVE LINES:  INDWELLING RAGLAND: Y  VTE PROPHYLAXIS: Heparin SC   CAM ICU: +  CODE STATUS: FULL        CRITICAL CARE TIME SPENT: 55 minutes spent performing frequent bedside reassessments and augmenting plan of care to address problems of acute critical illness that pose high probability of life threatening deterioration and/or end organ damage/dysfunction and discussing goals of care, non-inclusive of time spent on procedures performed.

## 2021-10-28 NOTE — BH CONSULTATION LIAISON ASSESSMENT NOTE - NSBHCHARTREVIEWVS_PSY_A_CORE FT
Vital Signs Last 24 Hrs  T(C): 38.1 (28 Oct 2021 04:00), Max: 38.1 (28 Oct 2021 04:00)  T(F): 100.6 (28 Oct 2021 04:00), Max: 100.6 (28 Oct 2021 04:00)  HR: 103 (28 Oct 2021 09:00) (103 - 145)  BP: 113/77 (28 Oct 2021 09:00) (110/80 - 147/87)  BP(mean): 90 (28 Oct 2021 09:00) (86 - 109)  RR: 14 (28 Oct 2021 09:00) (14 - 61)  SpO2: 93% (28 Oct 2021 09:00) (92% - 99%)

## 2021-10-28 NOTE — CHART NOTE - NSCHARTNOTEFT_GEN_A_CORE
Since extubation, patient has complained of generalized pain of both her upper extremities and legs, however most severe in her left wrist, which was recently operated on. She was also acutely anxious from having awoken on a respirator, and was given Dilaudid 0.5mg IV and Valium 10mg IV. She then began screaming repeatedly that she is in pain, and that she is missing one of her home medications that she takes at night. She continued to scream at myself and her nurse, demanding that we call her  to find out what medication she is missing. We reviewed her medication list with her repeatedly, both brand and generic names, checked I-STOP to confirm any known controlled substances, and called her  Dakota at 02:00 to obtain her medication list. He reviewed her home medications with both myself and her nurse, as well as spoke with the patient on the phone to review her medications, however she continued to repeatedly scream and curse at him that she is missing her medication that she takes for pain at night but she does not know the name. We attempted to verbally de-escalate the situation and calm the patient repeatedly, explaining that we are trying to help her in any way that we can and will treat her symptoms while we wait for her  to bring her medications in the morning, however she remained angrily screaming and kicking, demanding that we show her her home medications now, which are not present at the hospital at this time. She thought the name may start with a "Th" and eventually remembered it is a purple pill that she "takes a bunch of". On review of her PST H&P, it was noted she takes Nexium, which I attempted to show her a picture of to see if this was the pill, but she continued to scream at me and curse that we "are the worst f*** people" because we "do not know what [she] is supposed to be taking". We tried giving her a dose of zolpidem, which she usually takes nightly, with no effect. Patient's chart reveals a long-standing Diazepam-dependence, where she at one point purchased Diazepam from the [facility] from [phone] when she could not find a local physician to prescribe it for her. Per chart review, she takes anywhere from 20-40mg BID of oral Diazepam. She remained extremely agitated, angrily screaming and cursing at staff, and was given another dose of Diazepam 20mg IV with only transient effect for approximately 1 hour. She began again screaming, demanding pain medication, stating that she has been asking for pain medication for over 5 hours, when in fact she had only been extubated for approximately 2 hours. She is being treated with an additional dose of Dilaudid 1mg IV.    Patient's  Dakota was updated at length via telephone, and all questions answered. Since extubation, patient has complained of generalized pain of both her upper extremities and legs, however most severe in her left wrist, which was recently operated on. She was also acutely anxious from having awoken on a respirator, and was given Dilaudid 0.5mg IV and Valium 10mg IV. She then began screaming repeatedly that she is in pain, and that she is missing one of her home medications that she takes at night. She continued to scream at myself and her nurse, demanding that we call her  to find out what medication she is missing. We reviewed her medication list with her repeatedly, both brand and generic names, checked I-STOP to confirm any known controlled substances, and called her  Dakota at 02:00 to obtain her medication list. He reviewed her home medications with both myself and her nurse, as well as spoke with the patient on the phone to review her medications, however she continued to repeatedly scream and curse at him that she is missing her medication that she takes for pain at night but she does not know the name. We attempted to verbally de-escalate the situation and calm the patient repeatedly, explaining that we are trying to help her in any way that we can and will treat her symptoms while we wait for her  to bring her medications in the morning, however she remained angrily screaming and kicking, demanding that we show her her home medications now, which are not present at the hospital at this time. She thought the name may start with a "Th" and eventually remembered it is a purple pill that she "takes a bunch of". On review of her PST H&P, it was noted she takes Nexium, which I attempted to show her a picture of to see if this was the pill, but she continued to scream at me and curse that we "are the worst f*** people" because we "do not know what [she] is supposed to be taking". We tried giving her a dose of zolpidem, which she usually takes nightly, with no effect. Patient's chart reveals a long-standing Diazepam-dependence, where she at one point purchased Diazepam from the [facility] from [phone] when she could not find a local physician to prescribe it for her. Per chart review, she takes anywhere from 20-40mg BID of oral Diazepam. She remained extremely agitated, angrily screaming and cursing at staff, and was given another dose of Diazepam 20mg IV with only transient effect for approximately 1 hour. She began again screaming, demanding pain medication, stating that she has been asking for pain medication for over 5 hours, when in fact she had only been extubated for approximately 2 hours. She is being treated with an additional dose of Dilaudid 1mg IV. Given that her encephalopathy is still resolving and she is at high risk for respiratory depression with high risk for recurrent hypoxemic respiratory failure necessitating reintubation, we are cautiously reintroducing her medications and observing her for response to treatment. We are uptitrating her medications as tolerated from a respiratory, neurologic, and hemodynamic standpoint.    Patient's  Dakota was updated at length via telephone, and all questions answered. Since extubation, patient has complained of generalized pain of both her upper extremities and legs, however most severe in her left wrist, which was recently operated on. She was also acutely anxious from having awoken on a respirator, and was given Dilaudid 0.5mg IV and Valium 10mg IV. She then began screaming repeatedly that she is in pain, and that she is missing one of her home medications that she takes at night. She continued to scream at myself and her nurse, demanding that we call her  to find out what medication she is missing. We reviewed her medication list with her repeatedly, both brand and generic names, checked I-STOP to confirm any known controlled substances, and called her  Dakota at 02:00 to obtain her medication list. We attempted to verbally de-escalate the situation and calm the patient repeatedly, explaining that we are trying to help her in any way that we can and will treat her symptoms while we wait for her  to bring her medications in the morning, however she remained angrily screaming and kicking, demanding that we show her her home medications now, which are not present at the hospital at this time. She thought the name may start with a "Th" and eventually remembered it is a purple pill that she "takes a bunch of". On review of her PST H&P, it was noted she takes Nexium, which I attempted to show her a picture of to see if this was the pill, but she continued to scream at me and curse that we "are the worst f*** people" because we "do not know what [she] is supposed to be taking". Patient's chart reveals a long-standing Diazepam-dependence, where she at one point purchased Diazepam from the [facility] from [phone] when she could not find a local physician to prescribe it for her. Per chart review, she takes anywhere from 20-40mg BID of oral Diazepam. She remained extremely agitated, angrily screaming and cursing at staff, and was given another dose of Diazepam 20mg IV with only transient effect for approximately 1 hour. She began again screaming, demanding pain medication, stating that she has been asking for pain medication for over 5 hours, when in fact she had only been extubated for approximately 2 hours. She is being treated with an additional dose of Dilaudid 1mg IV. Given that her encephalopathy is still resolving and she is at high risk for respiratory depression with high risk for recurrent hypoxemic respiratory failure necessitating reintubation, we are cautiously reintroducing her medications and observing her for response to treatment. We are uptitrating her medications as tolerated from a respiratory, neurologic, and hemodynamic standpoint.    Patient's  Dakota was updated at length via telephone, and all questions answered.

## 2021-10-28 NOTE — CONSULT NOTE ADULT - SUBJECTIVE AND OBJECTIVE BOX
Patient is a 51y old  Female who presents with a chief complaint of Overdose (28 Oct 2021 12:45)       HPI:  52yo Female w/ PMHx of anxiety, depression, anorexia presented to the ED in an obtunded state, was intubated in ED and transferred to ICU. HPI provided by patient's  Dakota [phone] at bedside.  Patient had a Left distal radius ORIF performed by Dr. Veliz in [place] on Thursday 10/21/2021 and was started on percocet for pain control. Patient was already on valium and ambien for her anxiety. Patients  Dakota, was in charge of administering her percocet, states she would take 2 tabs q6H. Patient was still in control of self-administering her valium and ambien. As per , patient was given her percocet today at around 12:00, however patient seemed "off" to the  as she was slurring her words. Patient denied taking anything or feeling different when questioned by .  then went to take a shower and returned 45 minutes later to find patient unresponsive to verbal or physical stimuli. Patient's pulse ox was measured at home to be in the 70s, upon which patient's  immediately called 911. In ambulance patient was given 4mg of narcan with no change. In ICU, patient was sedated with ketamine and kept on ventilator.    Has Dugan, Agitated at times Awake and alert.        PAST MEDICAL & SURGICAL HISTORY:  Anxiety    Endometriosis determined by laparoscopy    Strabismus    GERD (gastroesophageal reflux disease)    Diverticulitis  h/o    Osteoporosis    Lymphadenopathy    H/O alopecia    H/O heartburn    History of diverticulitis    Closed Colles&#x27; fracture of left radius with malunion, subsequent encounter    Endometriosis determined by laparoscopy  over 15 years ago    History of strabismus surgery  2x    S/P colon resection  2017    S/P appendectomy  2017         FAMILY HISTORY:  Family history of heart disease  father  83    [place]    Social History:Non smoker    MEDICATIONS  (STANDING):  chlorhexidine 2% Cloths 1 Application(s) Topical <User Schedule>  chlorhexidine 4% Liquid 1 Application(s) Topical <User Schedule>  diazepam    Tablet 20 milliGRAM(s) Oral two times a day  FLUoxetine 20 milliGRAM(s) Oral daily  heparin   Injectable 5000 Unit(s) SubCutaneous every 12 hours  lactated ringers. 1000 milliLiter(s) (75 mL/Hr) IV Continuous <Continuous>  pantoprazole    Tablet 40 milliGRAM(s) Oral before breakfast  zolpidem 5 milliGRAM(s) Oral at bedtime    MEDICATIONS  (PRN):  oxycodone    5 mG/acetaminophen 325 mG 1 Tablet(s) Oral every 6 hours PRN Moderate Pain (4 - 6)  oxycodone    5 mG/acetaminophen 325 mG 2 Tablet(s) Oral every 4 hours PRN Severe Pain (7 - 10)   Meds reviewed    Allergies    predniSONE (Unknown)  Tree Nuts (Hives; Other)    Intolerances         REVIEW OF SYSTEMS:    Review of Systems:   Limited patient mildly agitated and wanted to go to bathroom    Vital Signs Last 24 Hrs  T(C): 38.1 (28 Oct 2021 04:00), Max: 38.1 (28 Oct 2021 04:00)  T(F): 100.6 (28 Oct 2021 04:00), Max: 100.6 (28 Oct 2021 04:00)  HR: 103 (28 Oct 2021 11:00) (103 - 145)  BP: 122/87 (28 Oct 2021 11:00) (110/80 - 147/87)  BP(mean): 100 (28 Oct 2021 11:00) (86 - 109)  RR: 29 (28 Oct 2021 11:00) (14 - 61)  SpO2: 95% (28 Oct 2021 11:00) (92% - 99%)  Daily     Daily     PHYSICAL EXAM:    GENERAL: NAD  HEAD:  Atraumatic, Normocephalic  EYES: EOMI, conjunctiva and sclera clear  ENMT: No Drainage from nares, No drainage from ears  NECK: Supple, neck  veins full  NERVOUS SYSTEM:  Awake and Alert  CHEST/LUNG: Clear to percussion bilaterally; No rales, rhonchi, wheezing, or rubs  HEART: Regular rate and rhythm; No murmurs, rubs, or gallops  ABDOMEN: Soft, Nontender, Nondistended; Bowel sounds present  EXTREMITIES:  No Edema  SKIN: No rashes No obvious ecchymosis  + Dugan    LABS:                        10.2   8.73  )-----------( 152      ( 28 Oct 2021 06:03 )             29.4     10    144  |  106  |  6<L>  ----------------------------<  124<H>  3.2<L>   |  31  |  1.40<H>    Ca    8.0<L>      28 Oct 2021 06:03  Phos  3.0     10-28  Mg     2.0     10-28    TPro  5.8<L>  /  Alb  2.5<L>  /  TBili  0.3  /  DBili  x   /  AST  26  /  ALT  19  /  AlkPhos  39<L>  10-28    PT/INR - ( 28 Oct 2021 06:03 )   PT: 11.3 sec;   INR: 0.96 ratio         PTT - ( 28 Oct 2021 06:03 )  PTT:30.8 sec  Urinalysis Basic - ( 26 Oct 2021 15:48 )    Color: Yellow / Appearance: Clear / S.010 / pH: x  Gluc: x / Ketone: Moderate  / Bili: Small / Urobili: Negative   Blood: x / Protein: 15 / Nitrite: Negative   Leuk Esterase: Negative / RBC: Negative /HPF / WBC 0-2   Sq Epi: x / Non Sq Epi: Occasional / Bacteria: Negative      Magnesium, Serum: 2.0 mg/dL (10-28 @ 06:03)  Phosphorus Level, Serum: 3.0 mg/dL (10-28 @ 06:03)    ABG - ( 28 Oct 2021 06:05 )  pH, Arterial: 7.39  pH, Blood: x     /  pCO2: 55    /  pO2: 120   / HCO3: 33    / Base Excess: 8.3   /  SaO2: 100.0                 RADIOLOGY & ADDITIONAL TESTS:

## 2021-10-28 NOTE — BH CONSULTATION LIAISON ASSESSMENT NOTE - SUMMARY
47yo MWF, domiciled with her  and 2 children, homemaker, medical hx of alopecia and ulcers, pphx of long standing anxiety from childhood and h/o eating disorder, 1 prior ER visit after pt threatened to cut her wrist after rehab when she was tappered off benzo's, recently switched providers from Dr. Parks to RAISSA Blevins after being dismissed from the practice for unknown reason, in therapy for several yrs with Dr. Baez, currently on lexapro, ambien and obtains valium from the [facility], no h/o SA or SIB, h/o ETOH abuse with 1 prior rehab at [facility] 2yrs ago, no h/o withdrawal, no legal hx, no violence hx, presented to the ER with high anxiety because she feels that she had an adverse reaction to valium. 50 yo MWF, domiciled with her  and 2 children, homemaker, medical hx of alopecia and ulcers, pphx of long standing anxiety from childhood and h/o eating disorder, admitted for accidental overdose

## 2021-10-28 NOTE — BH CONSULTATION LIAISON ASSESSMENT NOTE - HPI (INCLUDE ILLNESS QUALITY, SEVERITY, DURATION, TIMING, CONTEXT, MODIFYING FACTORS, ASSOCIATED SIGNS AND SYMPTOMS)
47yo MWF, domiciled with her  and 2 children, homemaker, medical hx of alopecia and ulcers, pphx of long standing anxiety from childhood and h/o eating disorder, 1 prior ER visit after pt threatened to cut her wrist after rehab when she was tappered off benzo's, recently switched providers from Dr. Parks to RAISSA Blevins after being dismissed from the practice for unknown reason, in therapy for several yrs with Dr. Baez, currently on lexapro, ambien and obtains valium from the [facility], no h/o SA or SIB, h/o ETOH abuse with 1 prior rehab at [facility] 2yrs ago, no h/o withdrawal, no legal hx, no violence hx, presented to the ER with high anxiety because she feels that she had an adverse reaction to valium.    The pt states that she has been tried on multiple anti-anxiety medications and antidepressants but nothing worked as well as valium. 2rys ago her anxiety increased and she began drinking during the day. after going to rehab, she was tapered off valium and states that she could not find a doctor to prescribe it. she reports becoming highly anxious, not being able to sleep and needing a family member to be home with her at all times. out of desperation she said that she would cut her wrist without the benzo's (denied intent) but family called 911 and brought her to [facility]. pt then googled how to obtain valium and [place] the [place] 2 [place]. pt said that she has been the happiest she has ever been on valium 40mg in divided doses daily. she states that her anxiety was vastly improved and allowed her to be a good mother. despite this, pt has felt guilty about keeping this secret and it became a burden to hide these pills. pt said that she told her long time therapist about this just last week. unclear if the reason pt was discharged from her psychiatrist was related to this.  pt said that today out of the blue she bought a travel sized bottle of alcohol and then could not do her usual breathalizer (does this TID and facetimes with her ). she tried to make excuses for this but then had a panic attack and called a friend who brought her to the ER. pt's view is that she had bad valium which made her anxious. in the ER the pt is asking for valium, wanting to know which providers can prescribe for her and anxious that she will have a withdrawal seizure. she is not interested in rehab/detox.  pt is denying depressed mood, si/intent/plan (nursing note says SI but pt denied this to ER attending and writer). normally pt is able to sleep well, complete ADLs and focus. she is anxious to get home before her children wake up and they are her reason for living. she also has a supportive family with siblings and parents.   denied symptoms of bradley including decreased need for sleep, increased goal directed activity, grandiosity. no psychotic symptoms of avh or paranoid delusions. no HI.    Daniela Blevins [phone] 49yo MWF, domiciled with her  and 2 children, homemaker, medical hx of alopecia and ulcers, pphx of long standing anxiety from childhood and h/o eating disorder, 1 prior ER visit after pt threatened to cut her wrist after rehab when she was tappered off benzo's, recently switched providers from Dr. Parks to RAISSA Blevins after being dismissed from the practice for unknown reason, in therapy for several yrs with Dr. Baez, currently on lexapro, ambien and obtains valium from the [facility], no h/o SA or SIB, h/o ETOH abuse with 1 prior rehab at [facility] 2yrs ago, no h/o withdrawal, no legal hx, no violence hx, presented to the ER with high anxiety because she feels that she had an adverse reaction to valium.    The pt states that she has been tried on multiple anti-anxiety medications and antidepressants but nothing worked as well as valium. 2rys ago her anxiety increased and she began drinking during the day. after going to rehab, she was tapered off valium and states that she could not find a doctor to prescribe it. she reports becoming highly anxious, not being able to sleep and needing a family member to be home with her at all times. out of desperation she said that she would cut her wrist without the benzo's (denied intent) but family called 911 and brought her to [facility]. pt then googled how to obtain valium and [place] the past 2 [place]rs. pt said that she has been the happiest she has ever been on valium 40mg in divided doses daily. she states that her anxiety was vastly improved and allowed her to be a good mother. despite this, pt has felt guilty about keeping this secret and it became a burden to hide these pills. pt said that she told her long time therapist about this just last week. unclear if the reason pt was discharged from her psychiatrist was related to this.  pt said that today out of the blue she bought a travel sized bottle of alcohol and then could not do her usual breathalizer (does this TID and facetimes with her ). she tried to make excuses for this but then had a panic attack and called a friend who brought her to the ER. pt's view is that she had bad valium which made her anxious. in the ER the pt is asking for valium, wanting to know which providers can prescribe for her and anxious that she will have a withdrawal seizure. she is not interested in rehab/detox.  pt is denying depressed mood, si/intent/plan (nursing note says SI but pt denied this to ER attending and writer). normally pt is able to sleep well, complete ADLs and focus. she is anxious to get home before her children wake up and they are her reason for living. she also has a supportive family with siblings and parents.   denied symptoms of bradley including decreased need for sleep, increased goal directed activity, grandiosity. no psychotic symptoms of avh or paranoid delusions. no HI. Patient this time is calm and cooperative, appears to be somewhat confused, strongly denying suicidal ideation, but not remembering the events leading her to hospitalization.

## 2021-10-28 NOTE — PROGRESS NOTE ADULT - ASSESSMENT
52 yo female with PMHx anxiety, depression, anorexia, recent left wrist fx s/p ORIF who presented with acute toxic-metabolic encephalopathy, acute hypoxemic respiratory failure, and SUSANA secondary to suspected toxic ingestion and overdose of uncertain intent.      Encephalopathy improving, now acutely anxious, dyscoordinate with vent  Placed on SBT and extubated to 50% VM, will wean down as tolerated to keep SpO2 >90%  Close airway watch, low threshold for reintubation if encephalopathy worsens  SUSANA/ATN, renal function stable, oliguric overnight given 1L IVF bolus of balanced crystalloid with improvement in UOP  Had started TF via NGT overnight, will keep NPO for now pending dysphagia screen  Start standing low dose benzodiazepines to prevent withdrawal (takes Valium 20mg PO BID standing at home)  Start analgesics for pain control  Continue PPI GI ppx while NPO  Heparin SC for DVT ppx

## 2021-10-28 NOTE — BH CONSULTATION LIAISON ASSESSMENT NOTE - DESCRIPTION
lives with  of 16yrs and 2 children aged 10 and 7yo. pt is a homemaker. she has supportive parents and siblings

## 2021-10-28 NOTE — BH CONSULTATION LIAISON ASSESSMENT NOTE - CURRENT MEDICATION
MEDICATIONS  (STANDING):  chlorhexidine 2% Cloths 1 Application(s) Topical <User Schedule>  chlorhexidine 4% Liquid 1 Application(s) Topical <User Schedule>  diazepam    Tablet 20 milliGRAM(s) Oral two times a day  FLUoxetine 20 milliGRAM(s) Oral daily  heparin   Injectable 5000 Unit(s) SubCutaneous every 12 hours  potassium chloride    Tablet ER 40 milliEquivalent(s) Oral every 4 hours  sodium chloride 0.45%. 1000 milliLiter(s) (75 mL/Hr) IV Continuous <Continuous>  zolpidem 5 milliGRAM(s) Oral at bedtime    MEDICATIONS  (PRN):  oxycodone    5 mG/acetaminophen 325 mG 1 Tablet(s) Oral every 6 hours PRN Moderate Pain (4 - 6)  oxycodone    5 mG/acetaminophen 325 mG 2 Tablet(s) Oral every 4 hours PRN Severe Pain (7 - 10)

## 2021-10-28 NOTE — PROGRESS NOTE ADULT - ASSESSMENT
52yo Female w/ PMHx of anxiety, depression, anorexia presented to the ED in an obtunded state from unknown source found by , admitted for acute hypoxic/hypercarbic respiratory failure 2/2 to possible drug overdose and SUSANA. Patient was intubated in ED and transferred to ICU for care. Patient remains intubated today, weaned off of ketamine sedation, not on pressors.     Plan:  NEURO:  -Patient awake and alert  -Patient on home Jimmie  -Serum ammonia wnl      CV:  -Maintain goal MAP >65-70.  -Troponin negative. Trend Troponin/EKGs  -Keep K~4 and Mg>2 for optimal arrhythmia suppression.     RESP:  -Patient on CPAP w/ PS (5 Pressure support and 5 PEEP)  - Will consider extubating patient when she is fully awake and alert/capable of protecting her own airway     RENAL:  -Cr increased to 2.00 from 1.70 today, BUN remains <20   -Follow up urine lytes to r/o possible intrinsic SUSANA   -F/u osmolar gap to r/o possible isopropyl alcohol ingestion (patient had mildly elevated alcohol level on admission)   -Salicylates negative     GI:  -NPO  -on 1/2 NS maintenance 75cc/hr     ENDO:  -glycemic control to limit FS glucose to <180mg/dl.    ID:  -F/u BloodCx/UrineCx  -SputumCx ordered. Will monitor off abx.   - Lactate remains elevated, started patient on maintenance fluids will trend lactate      HEME:  -DVT ppx w/ SC Heparin.     Updated Patient's spouse at bedside regarding the plan for patient's care/current interventions performed as well as labs to be ordered to further understand patient's condition.     Tubes/Lines:   R. basilic vein 20 gauge angiocath   R. metacarpal 20 gauge angiocath   Dugan catheter  50yo Female w/ PMHx of anxiety, depression, anorexia presented to the ED in an obtunded state from unknown source found by , admitted for acute hypoxic/hypercarbic respiratory failure 2/2 to possible drug overdose and SUSANA. Patient was intubated in ED and transferred to ICU for care.    Plan:  NEURO:  -Patient awake and alert  -c/w home Valium 20mg BID   -c/w home prozac 20mg   -On percocet 1 tab q6H for moderate pain, percocet 2 tabs q4H for severe pain   -c/w ambien 5mg       CV:  -Maintain goal MAP >65-70.  -Keep K~4 and Mg>2 for optimal arrhythmia suppression.     RESP:  -Patient extubated 10/28/2021, on room air       RENAL:  -Patient Cr improved to 1.4 from 2.1  -Patient has osmolar gap of 40 and FeNa of 3.1%, probable intrinsic SUSANA 2/2 to isopropyl alcohol ingestion, SUSANA improving    -hypokalemic today, K 3.2 repleted with 40mEq KCl tablet x 2  -Kailee d/c'ed    GI:  -On regular diet   -on LR 75 cc/hr     ENDO:  -glycemic control to limit FS glucose to <180mg/dl.    ID:  -Bld cx NGTD,   - Lactate normal 1.1     HEME:  -DVT ppx w/ SC Heparin.         Tubes/Lines:   R. basilic vein 20 gauge angiocath   R. metacarpal 20 gauge angiocath

## 2021-10-29 ENCOUNTER — TRANSCRIPTION ENCOUNTER (OUTPATIENT)
Age: 51
End: 2021-10-29

## 2021-10-29 VITALS
RESPIRATION RATE: 19 BRPM | SYSTOLIC BLOOD PRESSURE: 118 MMHG | HEART RATE: 81 BPM | OXYGEN SATURATION: 94 % | DIASTOLIC BLOOD PRESSURE: 76 MMHG

## 2021-10-29 LAB
ALBUMIN SERPL ELPH-MCNC: 2.5 G/DL — LOW (ref 3.3–5)
ALP SERPL-CCNC: 37 U/L — LOW (ref 40–120)
ALT FLD-CCNC: 23 U/L — SIGNIFICANT CHANGE UP (ref 12–78)
ANION GAP SERPL CALC-SCNC: 5 MMOL/L — SIGNIFICANT CHANGE UP (ref 5–17)
APTT BLD: 30.4 SEC — SIGNIFICANT CHANGE UP (ref 27.5–35.5)
AST SERPL-CCNC: 31 U/L — SIGNIFICANT CHANGE UP (ref 15–37)
BASOPHILS # BLD AUTO: 0.02 K/UL — SIGNIFICANT CHANGE UP (ref 0–0.2)
BASOPHILS NFR BLD AUTO: 0.5 % — SIGNIFICANT CHANGE UP (ref 0–2)
BILIRUB SERPL-MCNC: 0.3 MG/DL — SIGNIFICANT CHANGE UP (ref 0.2–1.2)
BUN SERPL-MCNC: 5 MG/DL — LOW (ref 7–23)
CALCIUM SERPL-MCNC: 8.7 MG/DL — SIGNIFICANT CHANGE UP (ref 8.5–10.1)
CHLORIDE SERPL-SCNC: 107 MMOL/L — SIGNIFICANT CHANGE UP (ref 96–108)
CO2 SERPL-SCNC: 32 MMOL/L — HIGH (ref 22–31)
CREAT SERPL-MCNC: 0.81 MG/DL — SIGNIFICANT CHANGE UP (ref 0.5–1.3)
EOSINOPHIL # BLD AUTO: 0.15 K/UL — SIGNIFICANT CHANGE UP (ref 0–0.5)
EOSINOPHIL NFR BLD AUTO: 3.7 % — SIGNIFICANT CHANGE UP (ref 0–6)
GLUCOSE SERPL-MCNC: 85 MG/DL — SIGNIFICANT CHANGE UP (ref 70–99)
HCT VFR BLD CALC: 28.5 % — LOW (ref 34.5–45)
HGB BLD-MCNC: 9.7 G/DL — LOW (ref 11.5–15.5)
IMM GRANULOCYTES NFR BLD AUTO: 0.2 % — SIGNIFICANT CHANGE UP (ref 0–1.5)
INR BLD: 0.95 RATIO — SIGNIFICANT CHANGE UP (ref 0.88–1.16)
LYMPHOCYTES # BLD AUTO: 1.39 K/UL — SIGNIFICANT CHANGE UP (ref 1–3.3)
LYMPHOCYTES # BLD AUTO: 34.2 % — SIGNIFICANT CHANGE UP (ref 13–44)
MAGNESIUM SERPL-MCNC: 1.7 MG/DL — SIGNIFICANT CHANGE UP (ref 1.6–2.6)
MCHC RBC-ENTMCNC: 32.9 PG — SIGNIFICANT CHANGE UP (ref 27–34)
MCHC RBC-ENTMCNC: 34 GM/DL — SIGNIFICANT CHANGE UP (ref 32–36)
MCV RBC AUTO: 96.6 FL — SIGNIFICANT CHANGE UP (ref 80–100)
MONOCYTES # BLD AUTO: 0.45 K/UL — SIGNIFICANT CHANGE UP (ref 0–0.9)
MONOCYTES NFR BLD AUTO: 11.1 % — SIGNIFICANT CHANGE UP (ref 2–14)
NEUTROPHILS # BLD AUTO: 2.05 K/UL — SIGNIFICANT CHANGE UP (ref 1.8–7.4)
NEUTROPHILS NFR BLD AUTO: 50.3 % — SIGNIFICANT CHANGE UP (ref 43–77)
NRBC # BLD: 0 /100 WBCS — SIGNIFICANT CHANGE UP (ref 0–0)
PHOSPHATE SERPL-MCNC: 1.8 MG/DL — LOW (ref 2.5–4.5)
PLATELET # BLD AUTO: 152 K/UL — SIGNIFICANT CHANGE UP (ref 150–400)
POTASSIUM SERPL-MCNC: 3.8 MMOL/L — SIGNIFICANT CHANGE UP (ref 3.5–5.3)
POTASSIUM SERPL-SCNC: 3.8 MMOL/L — SIGNIFICANT CHANGE UP (ref 3.5–5.3)
PROT SERPL-MCNC: 6.2 G/DL — SIGNIFICANT CHANGE UP (ref 6–8.3)
PROTHROM AB SERPL-ACNC: 11.1 SEC — SIGNIFICANT CHANGE UP (ref 10.6–13.6)
RBC # BLD: 2.95 M/UL — LOW (ref 3.8–5.2)
RBC # FLD: 11.6 % — SIGNIFICANT CHANGE UP (ref 10.3–14.5)
SODIUM SERPL-SCNC: 144 MMOL/L — SIGNIFICANT CHANGE UP (ref 135–145)
WBC # BLD: 4.07 K/UL — SIGNIFICANT CHANGE UP (ref 3.8–10.5)
WBC # FLD AUTO: 4.07 K/UL — SIGNIFICANT CHANGE UP (ref 3.8–10.5)

## 2021-10-29 PROCEDURE — 84443 ASSAY THYROID STIM HORMONE: CPT

## 2021-10-29 PROCEDURE — 83605 ASSAY OF LACTIC ACID: CPT

## 2021-10-29 PROCEDURE — 86769 SARS-COV-2 COVID-19 ANTIBODY: CPT

## 2021-10-29 PROCEDURE — 99291 CRITICAL CARE FIRST HOUR: CPT | Mod: 25

## 2021-10-29 PROCEDURE — 84702 CHORIONIC GONADOTROPIN TEST: CPT

## 2021-10-29 PROCEDURE — 36415 COLL VENOUS BLD VENIPUNCTURE: CPT

## 2021-10-29 PROCEDURE — 84133 ASSAY OF URINE POTASSIUM: CPT

## 2021-10-29 PROCEDURE — 83930 ASSAY OF BLOOD OSMOLALITY: CPT

## 2021-10-29 PROCEDURE — 99233 SBSQ HOSP IP/OBS HIGH 50: CPT

## 2021-10-29 PROCEDURE — U0003: CPT

## 2021-10-29 PROCEDURE — 70450 CT HEAD/BRAIN W/O DYE: CPT | Mod: MA

## 2021-10-29 PROCEDURE — 82550 ASSAY OF CK (CPK): CPT

## 2021-10-29 PROCEDURE — 84100 ASSAY OF PHOSPHORUS: CPT

## 2021-10-29 PROCEDURE — 96374 THER/PROPH/DIAG INJ IV PUSH: CPT

## 2021-10-29 PROCEDURE — 87205 SMEAR GRAM STAIN: CPT

## 2021-10-29 PROCEDURE — 82140 ASSAY OF AMMONIA: CPT

## 2021-10-29 PROCEDURE — 94003 VENT MGMT INPAT SUBQ DAY: CPT

## 2021-10-29 PROCEDURE — 36600 WITHDRAWAL OF ARTERIAL BLOOD: CPT

## 2021-10-29 PROCEDURE — 0042T: CPT

## 2021-10-29 PROCEDURE — 80053 COMPREHEN METABOLIC PANEL: CPT

## 2021-10-29 PROCEDURE — 83935 ASSAY OF URINE OSMOLALITY: CPT

## 2021-10-29 PROCEDURE — 94002 VENT MGMT INPAT INIT DAY: CPT

## 2021-10-29 PROCEDURE — 99232 SBSQ HOSP IP/OBS MODERATE 35: CPT

## 2021-10-29 PROCEDURE — 73110 X-RAY EXAM OF WRIST: CPT

## 2021-10-29 PROCEDURE — 85730 THROMBOPLASTIN TIME PARTIAL: CPT

## 2021-10-29 PROCEDURE — 93005 ELECTROCARDIOGRAM TRACING: CPT

## 2021-10-29 PROCEDURE — 84300 ASSAY OF URINE SODIUM: CPT

## 2021-10-29 PROCEDURE — 70498 CT ANGIOGRAPHY NECK: CPT | Mod: MA

## 2021-10-29 PROCEDURE — U0005: CPT

## 2021-10-29 PROCEDURE — 96375 TX/PRO/DX INJ NEW DRUG ADDON: CPT | Mod: XU

## 2021-10-29 PROCEDURE — 84436 ASSAY OF TOTAL THYROXINE: CPT

## 2021-10-29 PROCEDURE — 87040 BLOOD CULTURE FOR BACTERIA: CPT

## 2021-10-29 PROCEDURE — 82803 BLOOD GASES ANY COMBINATION: CPT

## 2021-10-29 PROCEDURE — 82436 ASSAY OF URINE CHLORIDE: CPT

## 2021-10-29 PROCEDURE — 84156 ASSAY OF PROTEIN URINE: CPT

## 2021-10-29 PROCEDURE — 31500 INSERT EMERGENCY AIRWAY: CPT

## 2021-10-29 PROCEDURE — 84540 ASSAY OF URINE/UREA-N: CPT

## 2021-10-29 PROCEDURE — 82962 GLUCOSE BLOOD TEST: CPT

## 2021-10-29 PROCEDURE — 84484 ASSAY OF TROPONIN QUANT: CPT

## 2021-10-29 PROCEDURE — 99232 SBSQ HOSP IP/OBS MODERATE 35: CPT | Mod: GC

## 2021-10-29 PROCEDURE — 80048 BASIC METABOLIC PNL TOTAL CA: CPT

## 2021-10-29 PROCEDURE — 81001 URINALYSIS AUTO W/SCOPE: CPT

## 2021-10-29 PROCEDURE — 85025 COMPLETE CBC W/AUTO DIFF WBC: CPT

## 2021-10-29 PROCEDURE — 80307 DRUG TEST PRSMV CHEM ANLYZR: CPT

## 2021-10-29 PROCEDURE — 82570 ASSAY OF URINE CREATININE: CPT

## 2021-10-29 PROCEDURE — 85610 PROTHROMBIN TIME: CPT

## 2021-10-29 PROCEDURE — 76775 US EXAM ABDO BACK WALL LIM: CPT

## 2021-10-29 PROCEDURE — 94799 UNLISTED PULMONARY SVC/PX: CPT

## 2021-10-29 PROCEDURE — 83735 ASSAY OF MAGNESIUM: CPT

## 2021-10-29 PROCEDURE — 71045 X-RAY EXAM CHEST 1 VIEW: CPT

## 2021-10-29 PROCEDURE — 70496 CT ANGIOGRAPHY HEAD: CPT | Mod: MA

## 2021-10-29 PROCEDURE — 84480 ASSAY TRIIODOTHYRONINE (T3): CPT

## 2021-10-29 RX ORDER — DIAZEPAM 5 MG
10 TABLET ORAL ONCE
Refills: 0 | Status: DISCONTINUED | OUTPATIENT
Start: 2021-10-29 | End: 2021-10-29

## 2021-10-29 RX ORDER — DIPHENHYDRAMINE HCL 50 MG
25 CAPSULE ORAL ONCE
Refills: 0 | Status: COMPLETED | OUTPATIENT
Start: 2021-10-29 | End: 2021-10-29

## 2021-10-29 RX ORDER — SODIUM,POTASSIUM PHOSPHATES 278-250MG
1 POWDER IN PACKET (EA) ORAL
Refills: 0 | Status: DISCONTINUED | OUTPATIENT
Start: 2021-10-29 | End: 2021-10-29

## 2021-10-29 RX ADMIN — OXYCODONE AND ACETAMINOPHEN 2 TABLET(S): 5; 325 TABLET ORAL at 10:15

## 2021-10-29 RX ADMIN — Medication 10 MILLIGRAM(S): at 09:18

## 2021-10-29 RX ADMIN — Medication 20 MILLIGRAM(S): at 01:01

## 2021-10-29 RX ADMIN — OXYCODONE AND ACETAMINOPHEN 2 TABLET(S): 5; 325 TABLET ORAL at 09:18

## 2021-10-29 RX ADMIN — Medication 25 MILLIGRAM(S): at 03:20

## 2021-10-29 NOTE — CHART NOTE - NSCHARTNOTEFT_GEN_A_CORE
*****************  iSTOP Report  *****************      Patient Name:Scarlett Gonzales     YOB: 1970       Address:[address] [place] [id number]     Sex:Female     Rx Written   Rx Dispensed   Drug   Quantity   Days  Supply   Prescriber Name    10/25/2021 10/25/2021 oxycodone-acetaminophen 5-325 mg tab  30 3 Sonny Veliz (MD)      10/20/2021 10/21/2021 oxycodone-acetaminophen 5-325 mg tab  30 5 BanteMorgan cobb      10/10/2021 10/11/2021 zolpidem tart er 12.5 mg tab  30 30 Casper Rome MD      10/10/2021 10/11/2021 diazepam 10 mg tablet  112 28 Casper Rome MD      09/14/2021 09/15/2021 diazepam 10 mg tablet  112 28 Casper Rome MD      08/15/2021 09/14/2021 zolpidem tart er 12.5 mg tab  30 30 Casper Rome MD      08/30/2021 08/30/2021 oxycodone-acetaminophen 5-325 mg tablet  20 5 Sonny Veliz (MD)      08/25/2021 08/25/2021 oxycodone-acetaminophen 5-325 mg tablet  20 5 Martin Doss DO      08/16/2021 08/17/2021 diazepam 10 mg tablet  112 28 Casper Rome MD      08/15/2021 08/17/2021 zolpidem tart er 12.5 mg tab  30 30 Casper Rome MD      07/19/2021 07/21/2021 diazepam 10 mg tablet  112 28 Casper Rome MD      06/19/2021 07/19/2021 zolpidem tart er 12.5 mg tab  30 30 Casper Rome MD      06/22/2021 06/24/2021 diazepam 10 mg tablet  112 28 Casper Rome MD      06/19/2021 06/21/2021 zolpidem tart er 12.5 mg tab  30 30 Casper Rome MD      05/25/2021 05/27/2021 diazepam 10 mg tablet  112 28 Casper Rome MD      04/19/2021 05/21/2021 zolpidem tart er 12.5 mg tab  30 30 Casper Rome MD      04/29/2021 04/29/2021 diazepam 10 mg tablet  112 28 Casper Rome MD      04/19/2021 04/19/2021 zolpidem tart er 12.5 mg tab  30 30 Casper Rome MD      04/18/2021 04/18/2021 zolpidem tartrate 10 mg tablet  1 1 Casper Rome MD      04/01/2021 04/01/2021 diazepam 10 mg tablet  112 28 Casper Rome MD      02/24/2021 03/23/2021 zolpidem tart er 12.5 mg tab  30 30 Casper Rome MD      03/01/2021 03/04/2021 diazepam 10 mg tablet  112 28 Casper Rome MD      02/24/2021 02/24/2021 zolpidem tart er 12.5 mg tab  30 30 Casper Rome MD      02/04/2021 02/04/2021 diazepam 10 mg tablet  112 28 Casper Rome MD      12/28/2020 01/26/2021 zolpidem tart er 12.5 mg tab  30 30 Casper Rome MD      01/07/2021 01/07/2021 diazepam 10 mg tablet  112 28 Casper Rome MD      12/28/2020 12/28/2020 zolpidem tart er 12.5 mg tab  30 30 Casper Rome MD      12/10/2020 12/10/2020 diazepam 10 mg tablet  112 28 Casper Rome MD      10/30/2020 11/29/2020 zolpidem tart er 12.5 mg tab  30 30 Casper Rome MD      11/12/2020 11/12/2020 diazepam 10 mg tablet  112 28 Casper Rome MD      10/30/2020 10/30/2020 zolpidem tart er 12.5 mg tab  30 30 Casper Rome MD

## 2021-10-29 NOTE — PROGRESS NOTE ADULT - PROBLEM SELECTOR PLAN 3
- suspect ATN from likely relative hypotension and hypoxemia due to sedating drugs and resp failure  - SUSANA improving and Cr down from 2.1 to 1.4 ; monitor BMP  - FENa of ~3%   - nephro ([facility]) consulted, recs appreciated
- suspect ATN from likely relative hypotension and hypoxemia due to sedating drugs and resp failure  - Cr appears to be plateauing ~2.1 ; monitor BMP  - nephro ([facility]) consulted, f/up recs  - urine studies sent off
- suspect ATN from likely relative hypotension and hypoxemia due to sedating drugs and resp failure  - SUSANA resolving and Cr down from 2.1 to 1.4  to 0.8 today  - counseled on avoiding NSAIDs given recent SUSANA  - FENa of ~3%   - nephro ([facility]) consulted, recs appreciated

## 2021-10-29 NOTE — PROGRESS NOTE ADULT - PROBLEM SELECTOR PLAN 1
Acute Respiratory failure with hypoxia and hypercapnia suspected to be due to toxic metabolic encephalopathy 2/2 suspected sedating drug overdose  - Patient on Percocet for pain for recent ORIF, also on Valium and Ambien at home for anxiety   - Intubated in ED, transferred to ICU   - Utox positive for benzos and opiates   - ABG pH 7.36/pCO2 58/HCO3 33/pO2 173/O2 sat 100%  - Lactate 4.4 -- now normalized   - brain imaging negative for hemorrhage, stenosis, or perfusion abnormality   - pt successfully extubated yesterday morning  - blood cultures NGTD  - receiving home benzo to decrease chance of withdrawal as pt was chronically taking valium 20mg po BID  - pt counseled on not taking pain medications if any sign of somnolence / lethargy and to avoid taking two tabs of percocet as a single dose.  - counseled to avoid all alcohol.   - psych (Lyle), recs appreciated -- no need for inpt psych, likely was accidental overdose ; no SI  - neuro (Caroline), recs appreciated  - ICU team planning to likely discharge the patient this morning.

## 2021-10-29 NOTE — PROGRESS NOTE ADULT - ASSESSMENT
-there is no evidence of acute ischemia.  -there is no evidence of significant arrhythmia.  -there is no evidence for meaningful  volume overload.  -bp and resp status greatly improved following drug od  -planning for dc home today  -dc is reasonable from a cv perspective

## 2021-10-29 NOTE — PROGRESS NOTE ADULT - ASSESSMENT
SUSANA  Drug Overdose  Hypokalemia  Respiratory Failure    -0.7  -SUSANA likely 2/2 hypoperfusion  -Urine lytes reviewed  -Extubated  -Lactic Acid improved  -Dugan removed; urinating well  -Renal indices are back to baseline  -Stable lytes    D/W ICU PA

## 2021-10-29 NOTE — PROGRESS NOTE ADULT - SUBJECTIVE AND OBJECTIVE BOX
Patient is a 51y old  Female who presents with a chief complaint of Overdose (28 Oct 2021 12:45)       HPI:  52yo Female w/ PMHx of anxiety, depression, anorexia presented to the ED in an obtunded state, was intubated in ED and transferred to ICU. HPI provided by patient's  Dakota [phone] at bedside.  Patient had a Left distal radius ORIF performed by Dr. Veliz in [place] on Thursday 10/21/2021 and was started on percocet for pain control. Patient was already on valium and ambien for her anxiety. Patients  Dakota, was in charge of administering her percocet, states she would take 2 tabs q6H. Patient was still in control of self-administering her valium and ambien. As per , patient was given her percocet today at around 12:00, however patient seemed "off" to the  as she was slurring her words. Patient denied taking anything or feeling different when questioned by .  then went to take a shower and returned 45 minutes later to find patient unresponsive to verbal or physical stimuli. Patient's pulse ox was measured at home to be in the 70s, upon which patient's  immediately called 911. In ambulance patient was given 4mg of narcan with no change. In ICU, patient was sedated with ketamine and kept on ventilator.    Has Dugan, Agitated at times Awake and alert.       Follow up SUSANA    No acute events overnight; patient stating she wants to sign out AMA today by noon       PAST MEDICAL & SURGICAL HISTORY:  Anxiety    Endometriosis determined by laparoscopy    Strabismus    GERD (gastroesophageal reflux disease)    Diverticulitis  h/o    Osteoporosis    Lymphadenopathy    H/O alopecia    H/O heartburn    History of diverticulitis    Closed Colles&#x27; fracture of left radius with malunion, subsequent encounter    Endometriosis determined by laparoscopy  over 15 years ago    History of strabismus surgery  2x    S/P colon resection  july 2017    S/P appendectomy  july 2017         FAMILY HISTORY:  Family history of heart disease  father  83    [place]    Social History:Non smoker    MEDICATIONS  (STANDING):  chlorhexidine 2% Cloths 1 Application(s) Topical <User Schedule>  chlorhexidine 4% Liquid 1 Application(s) Topical <User Schedule>  diazepam    Tablet 20 milliGRAM(s) Oral two times a day  FLUoxetine 20 milliGRAM(s) Oral daily  heparin   Injectable 5000 Unit(s) SubCutaneous every 12 hours  lactated ringers. 1000 milliLiter(s) (75 mL/Hr) IV Continuous <Continuous>  pantoprazole    Tablet 40 milliGRAM(s) Oral before breakfast  zolpidem 5 milliGRAM(s) Oral at bedtime    MEDICATIONS  (PRN):  oxycodone    5 mG/acetaminophen 325 mG 1 Tablet(s) Oral every 6 hours PRN Moderate Pain (4 - 6)  oxycodone    5 mG/acetaminophen 325 mG 2 Tablet(s) Oral every 4 hours PRN Severe Pain (7 - 10)   Meds reviewed    Allergies    predniSONE (Unknown)  Tree Nuts (Hives; Other)    Intolerances         REVIEW OF SYSTEMS:  All systems reviewed and are negative at this time    Vital Signs Last 24 Hrs  T(C): 36.7 (29 Oct 2021 07:44), Max: 37.1 (28 Oct 2021 18:01)  T(F): 98 (29 Oct 2021 07:44), Max: 98.8 (28 Oct 2021 20:13)  HR: 100 (29 Oct 2021 07:00) (79 - 115)  BP: 111/70 (29 Oct 2021 06:40) (111/66 - 131/82)  BP(mean): 85 (29 Oct 2021 06:40) (85 - 100)  RR: 20 (29 Oct 2021 07:00) (14 - 35)  SpO2: 96% (29 Oct 2021 07:00) (93% - 98%)    PHYSICAL EXAM:    GENERAL: NAD  HEAD:  Atraumatic, Normocephalic  EYES: EOMI, conjunctiva and sclera clear  ENMT: No Drainage from nares, No drainage from ears  NECK: Supple, neck  veins full  NERVOUS SYSTEM:  Awake and Alert  CHEST/LUNG: Clear to percussion bilaterally; No rales, rhonchi, wheezing, or rubs  HEART: Regular rate and rhythm; No murmurs, rubs, or gallops  ABDOMEN: Soft, Nontender, Nondistended; Bowel sounds present  EXTREMITIES:  No Edema  SKIN: No rashes No obvious ecchymosis      LABS:                                     10.2   8.73  )-----------( 152      ( 28 Oct 2021 06:03 )             29.4     10-29    144  |  107  |  5<L>  ----------------------------<  85  3.8   |  32<H>  |  0.81    Ca    8.7      29 Oct 2021 07:09  Phos  1.8     10-29  Mg     1.7     10-29    TPro  6.2  /  Alb  2.5<L>  /  TBili  0.3  /  DBili  x   /  AST  31  /  ALT  23  /  AlkPhos  37<L>  10-29    PT/INR - ( 29 Oct 2021 07:09 )   PT: 11.1 sec;   INR: 0.95 ratio         PTT - ( 29 Oct 2021 07:09 )  PTT:30.4 sec      ABG - ( 28 Oct 2021 06:05 )  pH, Arterial: 7.39  pH, Blood: x     /  pCO2: 55    /  pO2: 120   / HCO3: 33    / Base Excess: 8.3   /  SaO2: 100.0

## 2021-10-29 NOTE — PROGRESS NOTE ADULT - SUBJECTIVE AND OBJECTIVE BOX
[facility]    Jere Louis, Reyes, Saniya, Lyric, Star, Alise      [phone]    CHIEF COMPLAINT: Patient is a 51y old  Female who presents with a chief complaint of acute hypoxic and hypercarbic respiratory failure due to suspected sedative overdose (29 Oct 2021 09:07)      Follow Up: hypotension, drug od    Interim history: The patient reports no new symptoms.  Denies chest discomfort and shortness of breath.  No abdominal pain.  No new neurologic symptoms.      MEDICATIONS  (STANDING):  chlorhexidine 2% Cloths 1 Application(s) Topical <User Schedule>  chlorhexidine 4% Liquid 1 Application(s) Topical <User Schedule>  FLUoxetine 20 milliGRAM(s) Oral daily  heparin   Injectable 5000 Unit(s) SubCutaneous every 12 hours  pantoprazole    Tablet 40 milliGRAM(s) Oral before breakfast  potassium phosphate / sodium phosphate Powder (PHOS-NaK) 1 Packet(s) Oral four times a day with meals  zolpidem 5 milliGRAM(s) Oral at bedtime    MEDICATIONS  (PRN):  diazepam    Tablet 20 milliGRAM(s) Oral two times a day PRN anxiety  oxycodone    5 mG/acetaminophen 325 mG 1 Tablet(s) Oral every 6 hours PRN Moderate Pain (4 - 6)  oxycodone    5 mG/acetaminophen 325 mG 2 Tablet(s) Oral every 4 hours PRN Severe Pain (7 - 10)      REVIEW OF SYSTEMS:  eye, ent, GI, , allergic, dermatologic, musculoskeletal and neurologic are negative except as described above    Vital Signs Last 24 Hrs  T(C): 36.7 (29 Oct 2021 07:44), Max: 37.1 (28 Oct 2021 18:01)  T(F): 98 (29 Oct 2021 07:44), Max: 98.8 (28 Oct 2021 20:13)  HR: 81 (29 Oct 2021 08:11) (79 - 112)  BP: 118/76 (29 Oct 2021 08:11) (111/66 - 131/82)  BP(mean): 92 (29 Oct 2021 08:11) (85 - 92)  RR: 19 (29 Oct 2021 08:11) (15 - 35)  SpO2: 94% (29 Oct 2021 08:11) (93% - 98%)    I&O's Summary    28 Oct 2021 07:01  -  29 Oct 2021 07:00  --------------------------------------------------------  IN: 375 mL / OUT: 2302 mL / NET: -1927 mL        Telemetry past 24h:    PHYSICAL EXAM:    Constitutional: well-nourished, well-developed, NAD   HEENT:  MMM, sclerae anicteric, conjunctivae clear, no oral cyanosis.  Pulmonary: Non-labored, breath sounds are clear bilaterally, No wheezing, rales or rhonchi  Cardiovascular: Regular, S1 and S2.  No murmur.  No rubs, gallops or clicks  Gastrointestinal: Bowel Sounds present, soft, nontender.   Lymph: No peripheral edema.   Neurological: Alert, no focal deficits  Skin: No rashes.  Psych:  Mood & affect appropriate    LABS: All Labs Reviewed:                        9.7    4.07  )-----------( 152      ( 29 Oct 2021 07:09 )             28.5                         10.2   8.73  )-----------( 152      ( 28 Oct 2021 06:03 )             29.4                         11.9   8.59  )-----------( 196      ( 27 Oct 2021 09:36 )             34.4     29 Oct 2021 07:09    144    |  107    |  5      ----------------------------<  85     3.8     |  32     |  0.81   28 Oct 2021 06:03    144    |  106    |  6      ----------------------------<  124    3.2     |  31     |  1.40   27 Oct 2021 14:23    145    |  108    |  4      ----------------------------<  152    3.7     |  31     |  1.90     Ca    8.7        29 Oct 2021 07:09  Ca    8.0        28 Oct 2021 06:03  Ca    8.2        27 Oct 2021 14:23  Phos  1.8       29 Oct 2021 07:09  Phos  3.0       28 Oct 2021 06:03  Phos  4.7       26 Oct 2021 21:12  Mg     1.7       29 Oct 2021 07:09  Mg     2.0       28 Oct 2021 06:03  Mg     1.4       26 Oct 2021 21:12    TPro  6.2    /  Alb  2.5    /  TBili  0.3    /  DBili  x      /  AST  31     /  ALT  23     /  AlkPhos  37     29 Oct 2021 07:09  TPro  5.8    /  Alb  2.5    /  TBili  0.3    /  DBili  x      /  AST  26     /  ALT  19     /  AlkPhos  39     28 Oct 2021 06:03  TPro  6.1    /  Alb  2.8    /  TBili  0.2    /  DBili  x      /  AST  23     /  ALT  22     /  AlkPhos  43     27 Oct 2021 09:36    PT/INR - ( 29 Oct 2021 07:09 )   PT: 11.1 sec;   INR: 0.95 ratio         PTT - ( 29 Oct 2021 07:09 )  PTT:30.4 sec      Blood Culture: Organism --  Gram Stain Blood -- Gram Stain --  Specimen Source .Blood Blood  Culture-Blood --        10-28 @ 06:03  TSH: 0.51      RADIOLOGY:    EKG:    Echo:

## 2021-10-29 NOTE — PROGRESS NOTE ADULT - ASSESSMENT
50yo Female w/ PMHx of anxiety, depression, anorexia presented to the ED in an obtunded state from unknown source found by , admitted for acute hypoxic/hypercarbic respiratory failure 2/2 to possible drug overdose and SUSANA. Patient was intubated in ED and transferred to ICU for care. Patient discharged home today.     Plan:  NEURO:  -Patient awake and alert  -c/w home Valium 20mg BID   -c/w home prozac 20mg   -On percocet 1 tab q6H for moderate pain, percocet 2 tabs q4H for severe pain   -c/w ambien 5mg       CV:  -Maintain goal MAP >65-70.  -Keep K~4 and Mg>2 for optimal arrhythmia suppression.     RESP:  -Patient extubated 10/28/2021, on room air       RENAL:  -Patient Cr normal today 0.81  -Patient has osmolar gap of 40 and FeNa of 3.1%, probable intrinsic SUSANA 2/2 to isopropyl alcohol ingestion, SUSANA improved      GI:  -On regular diet       ENDO:  -glycemic control to limit FS glucose to <180mg/dl.    ID:  -Bld cx NGTD,   - Lactate normal 1.1     HEME:  -DVT ppx w/ SC Heparin.         Tubes/Lines:   R. basilic vein 20 gauge angiocath   R. metacarpal 20 gauge angiocath

## 2021-10-29 NOTE — PROGRESS NOTE ADULT - SUBJECTIVE AND OBJECTIVE BOX
Patient is a 51y old  Female who presents with a chief complaint of Overdose (29 Oct 2021 11:37)    24 hour events: No acute overnight events. Patient seen and examined at bedside.     REVIEW OF SYSTEMS  Neuro: No headache, numbness, weakness  Resp: No cough, wheezing, shortness of breath  CVS: No chest pain, palpitations, leg swelling  GI: No abdominal pain, nausea, vomiting, diarrhea   : No dysuria  Msk: +left arm pain   Psych: No depression, anxiety, mood swings  Heme: No bleeding    T(F): 98 (10-29-21 @ 07:44), Max: 98.8 (10-28-21 @ 20:13)  HR: 81 (10-29-21 @ 08:11) (79 - 112)  BP: 118/76 (10-29-21 @ 08:11) (111/66 - 118/81)  RR: 19 (10-29-21 @ 08:11) (15 - 35)  SpO2: 94% (10-29-21 @ 08:11) (93% - 98%)  Wt(kg): --            I&O's Summary    10-28 @ 07:01  -  10-29 @ 07:00  --------------------------------------------------------  IN: 375 mL / OUT: 2302 mL / NET: -1927 mL      PHYSICAL EXAM  General:   CNS:   HEENT:   Resp:   CVS:   Abd:   Ext:   Skin:     MEDICATIONS          diazepam    Tablet Oral PRN  FLUoxetine Oral  oxycodone    5 mG/acetaminophen 325 mG Oral PRN  oxycodone    5 mG/acetaminophen 325 mG Oral PRN  zolpidem Oral      heparin   Injectable SubCutaneous    pantoprazole    Tablet Oral      potassium phosphate / sodium phosphate Powder (PHOS-NaK) Oral      chlorhexidine 2% Cloths Topical  chlorhexidine 4% Liquid Topical                            9.7    4.07  )-----------( 152      ( 29 Oct 2021 07:09 )             28.5       10-29    144  |  107  |  5<L>  ----------------------------<  85  3.8   |  32<H>  |  0.81    Ca    8.7      29 Oct 2021 07:09  Phos  1.8     10-29  Mg     1.7     10-29    TPro  6.2  /  Alb  2.5<L>  /  TBili  0.3  /  DBili  x   /  AST  31  /  ALT  23  /  AlkPhos  37<L>  10-29          PT/INR - ( 29 Oct 2021 07:09 )   PT: 11.1 sec;   INR: 0.95 ratio         PTT - ( 29 Oct 2021 07:09 )  PTT:30.4 sec    .Blood Blood   No growth to date. -- 10-26 @ 22:00        Radiology: ***  Bedside lung ultrasound: ***  Bedside ECHO: ***    CENTRAL LINE: Y/N          DATE INSERTED:              REMOVE: Y/N  RAGLAND: Y/N                        DATE INSERTED:              REMOVE: Y/N  A-LINE: Y/N                       DATE INSERTED:              REMOVE: Y/N    GLOBAL ISSUE/BEST PRACTICE  Analgesia:   Sedation:   CAM-ICU:   HOB elevation: yes  Stress ulcer prophylaxis:   VTE prophylaxis:   Glycemic control:   Nutrition:     CODE STATUS: ***  [facility] discussion: Y       Patient is a 51y old  Female who presents with a chief complaint of Overdose (29 Oct 2021 11:37)    24 hour events: No acute overnight events. Patient seen and examined at bedside.     REVIEW OF SYSTEMS  Neuro: No headache, numbness, weakness  Resp: No cough, wheezing, shortness of breath  CVS: No chest pain, palpitations, leg swelling  GI: No abdominal pain, nausea, vomiting, diarrhea   : No dysuria  Msk: +left arm pain   Psych: No depression, anxiety, mood swings  Heme: No bleeding    T(F): 98 (10-29-21 @ 07:44), Max: 98.8 (10-28-21 @ 20:13)  HR: 81 (10-29-21 @ 08:11) (79 - 112)  BP: 118/76 (10-29-21 @ 08:11) (111/66 - 118/81)  RR: 19 (10-29-21 @ 08:11) (15 - 35)  SpO2: 94% (10-29-21 @ 08:11) (93% - 98%)  Wt(kg): --            I&O's Summary    10-28 @ 07:01  -  10-29 @ 07:00  --------------------------------------------------------  IN: 375 mL / OUT: 2302 mL / NET: -1927 mL      PHYSICAL EXAM  Gen: Thin female, NAD   Neuro: Awake and alert, moving all extremities   CV: s1,s2, tachycardic, no rubs, murmurs or gallops   Pulm: CTA b/l, no wheezes, rales, or rhonchi   GI: soft, nd,   Extremities: no clubbing, cyanosis, or edema, left arm in cast      MEDICATIONS          diazepam    Tablet Oral PRN  FLUoxetine Oral  oxycodone    5 mG/acetaminophen 325 mG Oral PRN  oxycodone    5 mG/acetaminophen 325 mG Oral PRN  zolpidem Oral      heparin   Injectable SubCutaneous    pantoprazole    Tablet Oral      potassium phosphate / sodium phosphate Powder (PHOS-NaK) Oral      chlorhexidine 2% Cloths Topical  chlorhexidine 4% Liquid Topical                            9.7    4.07  )-----------( 152      ( 29 Oct 2021 07:09 )             28.5       10-29    144  |  107  |  5<L>  ----------------------------<  85  3.8   |  32<H>  |  0.81    Ca    8.7      29 Oct 2021 07:09  Phos  1.8     10-29  Mg     1.7     10-29    TPro  6.2  /  Alb  2.5<L>  /  TBili  0.3  /  DBili  x   /  AST  31  /  ALT  23  /  AlkPhos  37<L>  10-29          PT/INR - ( 29 Oct 2021 07:09 )   PT: 11.1 sec;   INR: 0.95 ratio         PTT - ( 29 Oct 2021 07:09 )  PTT:30.4 sec    .Blood Blood   No growth to date. -- 10-26 @ 22:00        Radiology: < from: Xray Wrist 3 Views, Right (10.28.21 @ 04:45) >    EXAM:  XR WRIST COMP MIN 3 VIEWS RT                          EXAM:  XR CHEST PORTABLE IMMED 1V                            PROCEDURE DATE:  10/27/2021          INTERPRETATION:  AP semierect chest on October 27, 2021 at at 8:06 PM. 2 images. NG tube repositioned.    Heart size is within normal limits. Endotracheal tube remains with tip 1.5 cm above the sommer.    There is a sizable density now seen over the right upper outer lung field new since October 26. Exact significance uncertain. Conceivably is extraneous.    NG tube is now in good position in the stomach.    Right wrist. Patient has local pain. 3 views. There is an IV in the dorsum. Bony structures unremarkable.    IMPRESSION: Good position of NG tube. Density now seen over the right upper outer lung. It could be extraneous.    Right wrist unremarkable.    --- End of Report ---            AINSLEY WHITE MD; Attending Radiologist  This document has been electronically signed. Oct 28 2021 10:04AM        CENTRAL LINE: N          RAGLAND: N                        A-LINE: N                           GLOBAL ISSUE/BEST PRACTICE  Analgesia: Y  Sedation: N  CAM-ICU:   HOB elevation: yes  Stress ulcer prophylaxis: Y   VTE prophylaxis: Heparin SC  Glycemic control: N  Nutrition: regular diet     CODE STATUS: Full Code      Patient is a 51y old  Female who presents with a chief complaint of Overdose (29 Oct 2021 11:37)    24 hour events: No acute overnight events. Patient seen and examined at bedside. Complains of anxiety    REVIEW OF SYSTEMS  Neuro: No headache, numbness, weakness  Resp: No cough, wheezing, shortness of breath  CVS: No chest pain, palpitations, leg swelling  GI: No abdominal pain, nausea, vomiting, diarrhea   : No dysuria  Msk: +left arm pain   Psych: No depression, anxiety, mood swings  Heme: No bleeding    T(F): 98 (10-29-21 @ 07:44), Max: 98.8 (10-28-21 @ 20:13)  HR: 81 (10-29-21 @ 08:11) (79 - 112)  BP: 118/76 (10-29-21 @ 08:11) (111/66 - 118/81)  RR: 19 (10-29-21 @ 08:11) (15 - 35)  SpO2: 94% (10-29-21 @ 08:11) (93% - 98%)  Wt(kg): --            I&O's Summary    10-28 @ 07:01  -  10-29 @ 07:00  --------------------------------------------------------  IN: 375 mL / OUT: 2302 mL / NET: -1927 mL      PHYSICAL EXAM  Gen: Thin female, NAD   Neuro: Awake and alert, moving all extremities   CV: s1,s2, tachycardic, no rubs, murmurs or gallops   Pulm: CTA b/l, no wheezes, rales, or rhonchi   GI: soft, nd,   Extremities: no clubbing, cyanosis, or edema, left arm in cast      MEDICATIONS          diazepam    Tablet Oral PRN  FLUoxetine Oral  oxycodone    5 mG/acetaminophen 325 mG Oral PRN  oxycodone    5 mG/acetaminophen 325 mG Oral PRN  zolpidem Oral      heparin   Injectable SubCutaneous    pantoprazole    Tablet Oral      potassium phosphate / sodium phosphate Powder (PHOS-NaK) Oral      chlorhexidine 2% Cloths Topical  chlorhexidine 4% Liquid Topical                            9.7    4.07  )-----------( 152      ( 29 Oct 2021 07:09 )             28.5       10-29    144  |  107  |  5<L>  ----------------------------<  85  3.8   |  32<H>  |  0.81    Ca    8.7      29 Oct 2021 07:09  Phos  1.8     10-29  Mg     1.7     10-29    TPro  6.2  /  Alb  2.5<L>  /  TBili  0.3  /  DBili  x   /  AST  31  /  ALT  23  /  AlkPhos  37<L>  10-29          PT/INR - ( 29 Oct 2021 07:09 )   PT: 11.1 sec;   INR: 0.95 ratio         PTT - ( 29 Oct 2021 07:09 )  PTT:30.4 sec    .Blood Blood   No growth to date. -- 10-26 @ 22:00        Radiology: < from: Xray Wrist 3 Views, Right (10.28.21 @ 04:45) >    EXAM:  XR WRIST COMP MIN 3 VIEWS RT                          EXAM:  XR CHEST PORTABLE IMMED 1V                            PROCEDURE DATE:  10/27/2021          INTERPRETATION:  AP semierect chest on October 27, 2021 at at 8:06 PM. 2 images. NG tube repositioned.    Heart size is within normal limits. Endotracheal tube remains with tip 1.5 cm above the sommer.    There is a sizable density now seen over the right upper outer lung field new since October 26. Exact significance uncertain. Conceivably is extraneous.    NG tube is now in good position in the stomach.    Right wrist. Patient has local pain. 3 views. There is an IV in the dorsum. Bony structures unremarkable.    IMPRESSION: Good position of NG tube. Density now seen over the right upper outer lung. It could be extraneous.    Right wrist unremarkable.    --- End of Report ---            AINSLEY WHITE MD; Attending Radiologist  This document has been electronically signed. Oct 28 2021 10:04AM        CENTRAL LINE: N          RAGLAND: N                        A-LINE: N                           GLOBAL ISSUE/BEST PRACTICE  Analgesia: Y  Sedation: N  HOB elevation: yes  Stress ulcer prophylaxis: Y   VTE prophylaxis: Heparin SC  Glycemic control: N  Nutrition: regular diet     CODE STATUS: Full Code

## 2021-10-29 NOTE — PROGRESS NOTE ADULT - PROBLEM SELECTOR PLAN 2
Acute Respiratory failure with hypoxia and hypercapnia suspected to be due to toxic metabolic encephalopathy 2/2 suspected sedating drug overdose  - Patient on Percocet for pain for recent ORIF, also on Valium and Ambien at home for anxiety   - Intubated in ED, transferred to ICU   - ABG pH 7.36/pCO2 58/HCO3 33/pO2 173/O2 sat 100%  - pt successfully extubated early this morning  - CXR had some opacity in right inferior perihilar region, now shifted on repeat CXR so suspected it could have been extraneous item projecting over lung field  - had a one time fever to 100.6F rectally at 4AM ; monitoring closely off Abx -- pt is asymptomatic, on RA and has normal WBC count
Acute Respiratory failure with hypoxia and hypercapnia suspected to be due to toxic metabolic encephalopathy 2/2 suspected sedating drug overdose  - Patient on Percocet for pain for recent ORIF, also on Valium and Ambien at home for anxiety   - Intubated in ED, transferred to ICU   - ABG pH 7.36/pCO2 58/HCO3 33/pO2 173/O2 sat 100%  - being trialed on pressure support on vent and weaned off sedation --- extubate when deemed safe by critical care team  - CXR had some infiltrate in right inferior perihilar region, improving on subsequent CXR  - no clear sign of infection at this time, but may need to eval for aspiration PNA if hypoxemia persists or if develops fever or other signs of infection
Acute Respiratory failure with hypoxia and hypercapnia suspected to be due to toxic metabolic encephalopathy 2/2 suspected sedating drug overdose  - Patient on Percocet for pain for recent ORIF, also on Valium and Ambien at home for anxiety   - Intubated in ED, transferred to ICU   - ABG pH 7.36/pCO2 58/HCO3 33/pO2 173/O2 sat 100%  - pt successfully extubated yesterday morning  - CXR had some opacity in right inferior perihilar region, now shifted on repeat CXR so suspected it could have been extraneous item projecting over lung field  - had a one time fever to 100.6F rectally yesterday at 4AM and normal temperature since then; monitoring closely off Abx -- pt is asymptomatic, on RA and has normal WBC count

## 2021-10-29 NOTE — PROGRESS NOTE ADULT - PROBLEM SELECTOR PLAN 4
Chronic  - On high dose of Valium at home, reportedly 20mg po BID   - held initially while sedated, but restarted to decrease chance of withdrawal and pt tolerating her home dose
Chronic  - On high dose of Valium at home, reportedly 20mg po BID   - held while sedated, but will be given some PRN benzos to decrease chance of withdrawal
Chronic  - On high dose of Valium at home, reportedly 20mg po BID   - held initially while sedated, but restarted home valium to decrease chance of withdrawal and pt tolerating her home dose

## 2021-10-29 NOTE — DISCHARGE NOTE NURSING/CASE MANAGEMENT/SOCIAL WORK - PATIENT PORTAL LINK FT
You can access the FollowMyHealth Patient Portal offered by [facility] by registering at the following website: [email or web address]. By joining [facility]’s FollowMyHealth portal, you will also be able to view your health information using other applications (apps) compatible with our system.

## 2021-10-29 NOTE — PROGRESS NOTE ADULT - PROBLEM SELECTOR PLAN 5
Chronic   - c/w prozac  - psych (Lyle), recs appreciated -- no need for inpt psych, likely was accidental overdose ; no SI
Chronic   - will restart pt's home prozac if pt is extubated soon or via OG tube by tomorrow
Chronic   - c/w prozac  - psych (Lyle), recs appreciated -- no need for inpt psych, likely was accidental overdose ; no SI

## 2021-10-29 NOTE — PROGRESS NOTE ADULT - PROVIDER SPECIALTY LIST ADULT
Critical Care
Hospitalist
Nephrology
Cardiology
Critical Care
Critical Care
Cardiology
Critical Care
Nephrology
Neurology
Critical Care
Hospitalist
Hospitalist

## 2021-10-29 NOTE — PROGRESS NOTE ADULT - REASON FOR ADMISSION
Overdose
acute hypoxic and hypercarbic respiratory failure due to suspected sedative overdose
acute hypoxic and hypercarbic respiratory failure due to suspected sedative overdose
Overdose
acute hypoxic and hypercarbic respiratory failure due to suspected sedative overdose
Overdose

## 2021-10-29 NOTE — PROGRESS NOTE ADULT - ASSESSMENT
50yo Female w/ PMHx of anxiety, depression, anorexia presented to the ED in an obtunded state from unknown source found by , admitted for suspected toxic metabolic encephalopathy and acute respiratory failure w/ hypoxia and hypercapnia.

## 2021-10-29 NOTE — PROGRESS NOTE ADULT - ATTENDING COMMENTS
50 yo woman with Hx depression, anxiety, ORIF L distal radius on 10/21 presenting with obtundation and acute hypoxemic respiratory failure, from OD from polypharmacy +/- EtOH, complicated by SUSANA.  Continues to improve    --mentating well though anxious  continue home vakium 20mg bid, continue prozac and ambien  no psych contraindication to d/c  --s/p L wrist ORIF  pain control with percocet   --hemodynamically stable  --respiratory status stable  --SUSANA improving  --tolerating diet  --no infectious concerns at this point  --stable for d/c home  pt counseled on additive effects of percocet and valium, advised to separate by several hours
52 yo woman with Hx depression, anxiety, ORIF L distal radius on 10/21 presenting with obtundation and acute hypoxemic respiratory failure, from OD from polypharmacy +/- EtOH, complicated by SUSANA.      --now awake and mentating well  has no recollection of events surrounding admission, denies intentional OD or SA  continue home vakium 20mg bid  continue prozac and ambien  psych eval appreciated  --s/p L wrist ORIF  pain control with percocet   --hemodynamically stable  --respiratory status stable  --SUSANA improving, FeNa 3%  change IVF to LR at 75/h  d/c watkins  lactic acidosis resolved  --advance diet  --no infectious concerns at this point  --stable for floor  --plan discussed with pt and  in detail
52 yo woman with Hx depression, anxiety, ORIF L distal radius on 10/21 presenting with obtundation and acute hypoxemic respiratory failure, possible OD from polypharmacy +/- EtOH, complicated by SUSANA.      --weaned off ketamine this am  this evening more responsive than in am, follows commands reliably, moves all extremities (except for LUE limited by long cast)  start low dose benzos to prevent withdrawal  appreciate neuro eval  --hemodynamically stable  --acute hypoxemic and hypercapnic respiratory failure  tolerated PS 5/5 but mental status not adequate for extubation yet  --SUSANA persists  continue urine lytes and renal US  lactic acidosis, improving, continue IVF  --NPO pending extubation  --no infectious concerns at this point  --plan discussed with  in detail

## 2021-10-29 NOTE — PROGRESS NOTE ADULT - SUBJECTIVE AND OBJECTIVE BOX
Patient is a 51y old  Female who presents with a chief complaint of unresponsiveness.       INTERVAL HPI/OVERNIGHT EVENTS: No major acute events overnight. Pt states she is feeling well and eager to go home to her children. Pain in her left arm is well-controlled. Denies fever, chills, SOB, CP, cough.     MEDICATIONS  (STANDING):  chlorhexidine 2% Cloths 1 Application(s) Topical <User Schedule>  chlorhexidine 4% Liquid 1 Application(s) Topical <User Schedule>  FLUoxetine 20 milliGRAM(s) Oral daily  heparin   Injectable 5000 Unit(s) SubCutaneous every 12 hours  pantoprazole    Tablet 40 milliGRAM(s) Oral before breakfast  potassium phosphate / sodium phosphate Powder (PHOS-NaK) 1 Packet(s) Oral four times a day with meals  zolpidem 5 milliGRAM(s) Oral at bedtime    MEDICATIONS  (PRN):  diazepam    Tablet 20 milliGRAM(s) Oral two times a day PRN anxiety  oxycodone    5 mG/acetaminophen 325 mG 1 Tablet(s) Oral every 6 hours PRN Moderate Pain (4 - 6)  oxycodone    5 mG/acetaminophen 325 mG 2 Tablet(s) Oral every 4 hours PRN Severe Pain (7 - 10)      Allergies    predniSONE (Unknown)  Tree Nuts (Hives; Other)    Intolerances        REVIEW OF SYSTEMS:  CONSTITUTIONAL: denies fever or chills  HEENT:  No headache, no sore throat  RESPIRATORY: No cough, wheezing, or shortness of breath  CARDIOVASCULAR: No chest pain, palpitations  GASTROINTESTINAL: No abd pain, nausea, vomiting, or diarrhea  GENITOURINARY: No dysuria, frequency, or hematuria  NEUROLOGICAL: no focal weakness or dizziness  MUSCULOSKELETAL: pain in left UE well-controlled    Vital Signs Last 24 Hrs  T(C): 36.7 (29 Oct 2021 07:44), Max: 37.1 (28 Oct 2021 18:01)  T(F): 98 (29 Oct 2021 07:44), Max: 98.8 (28 Oct 2021 20:13)  HR: 81 (29 Oct 2021 08:11) (79 - 112)  BP: 118/76 (29 Oct 2021 08:11) (111/66 - 131/82)  BP(mean): 92 (29 Oct 2021 08:11) (85 - 92)  RR: 19 (29 Oct 2021 08:11) (15 - 35)  SpO2: 94% (29 Oct 2021 08:11) (93% - 98%)    PHYSICAL EXAM:  GENERAL: NAD at rest  HEENT:  anicteric, moist mucous membranes  CHEST/LUNG:  CTA b/l, no rales, wheezes, or rhonchi  HEART:  rrr, S1, S2  ABDOMEN:  BS+, soft, nontender, nondistended  EXTREMITIES: no edema, cyanosis, or calf tenderness ; left wrist/forearm surgically wrapped - clean/dry/intact  NERVOUS SYSTEM: AA&Ox3, answers questions and follows commands appropriately    LABS:                        9.7    4.07  )-----------( 152      ( 29 Oct 2021 07:09 )             28.5     CBC Full  -  ( 29 Oct 2021 07:09 )  WBC Count : 4.07 K/uL  Hemoglobin : 9.7 g/dL  Hematocrit : 28.5 %  Platelet Count - Automated : 152 K/uL  Mean Cell Volume : 96.6 fl  Mean Cell Hemoglobin : 32.9 pg  Mean Cell Hemoglobin Concentration : 34.0 gm/dL  Auto Neutrophil # : 2.05 K/uL  Auto Lymphocyte # : 1.39 K/uL  Auto Monocyte # : 0.45 K/uL  Auto Eosinophil # : 0.15 K/uL  Auto Basophil # : 0.02 K/uL  Auto Neutrophil % : 50.3 %  Auto Lymphocyte % : 34.2 %  Auto Monocyte % : 11.1 %  Auto Eosinophil % : 3.7 %  Auto Basophil % : 0.5 %    29 Oct 2021 07:09    144    |  107    |  5      ----------------------------<  85     3.8     |  32     |  0.81     Ca    8.7        29 Oct 2021 07:09  Phos  1.8       29 Oct 2021 07:09  Mg     1.7       29 Oct 2021 07:09    TPro  6.2    /  Alb  2.5    /  TBili  0.3    /  DBili  x      /  AST  31     /  ALT  23     /  AlkPhos  37     29 Oct 2021 07:09    PT/INR - ( 29 Oct 2021 07:09 )   PT: 11.1 sec;   INR: 0.95 ratio         PTT - ( 29 Oct 2021 07:09 )  PTT:30.4 sec    CAPILLARY BLOOD GLUCOSE            Culture - Blood (collected 10-26-21 @ 22:00)  Source: .Blood Blood  Preliminary Report (10-27-21 @ 23:01):    No growth to date.    Culture - Blood (collected 10-26-21 @ 22:00)  Source: .Blood Blood  Preliminary Report (10-27-21 @ 22:01):    No growth to date.        RADIOLOGY & ADDITIONAL TESTS:    Personally reviewed.     Consultant(s) Notes Reviewed:  [x] YES  [ ] NO     Patient is a 51y old  Female who presents with a chief complaint of unresponsiveness.        INTERVAL HPI/OVERNIGHT EVENTS: No major acute events overnight. Pt states she is feeling well and eager to go home to her children. Pain in her left arm is well-controlled. Denies fever, chills, SOB, CP, cough.     MEDICATIONS  (STANDING):  chlorhexidine 2% Cloths 1 Application(s) Topical <User Schedule>  chlorhexidine 4% Liquid 1 Application(s) Topical <User Schedule>  FLUoxetine 20 milliGRAM(s) Oral daily  heparin   Injectable 5000 Unit(s) SubCutaneous every 12 hours  pantoprazole    Tablet 40 milliGRAM(s) Oral before breakfast  potassium phosphate / sodium phosphate Powder (PHOS-NaK) 1 Packet(s) Oral four times a day with meals  zolpidem 5 milliGRAM(s) Oral at bedtime    MEDICATIONS  (PRN):  diazepam    Tablet 20 milliGRAM(s) Oral two times a day PRN anxiety  oxycodone    5 mG/acetaminophen 325 mG 1 Tablet(s) Oral every 6 hours PRN Moderate Pain (4 - 6)  oxycodone    5 mG/acetaminophen 325 mG 2 Tablet(s) Oral every 4 hours PRN Severe Pain (7 - 10)      Allergies    predniSONE (Unknown)  Tree Nuts (Hives; Other)    Intolerances        REVIEW OF SYSTEMS:  CONSTITUTIONAL: denies fever or chills  HEENT:  No headache, no sore throat  RESPIRATORY: No cough, wheezing, or shortness of breath  CARDIOVASCULAR: No chest pain, palpitations  GASTROINTESTINAL: No abd pain, nausea, vomiting, or diarrhea  GENITOURINARY: No dysuria, frequency, or hematuria  NEUROLOGICAL: no focal weakness or dizziness  MUSCULOSKELETAL: pain in left UE well-controlled    Vital Signs Last 24 Hrs  T(C): 36.7 (29 Oct 2021 07:44), Max: 37.1 (28 Oct 2021 18:01)  T(F): 98 (29 Oct 2021 07:44), Max: 98.8 (28 Oct 2021 20:13)  HR: 81 (29 Oct 2021 08:11) (79 - 112)  BP: 118/76 (29 Oct 2021 08:11) (111/66 - 131/82)  BP(mean): 92 (29 Oct 2021 08:11) (85 - 92)  RR: 19 (29 Oct 2021 08:11) (15 - 35)  SpO2: 94% (29 Oct 2021 08:11) (93% - 98%)    PHYSICAL EXAM:  GENERAL: NAD at rest  HEENT:  anicteric, moist mucous membranes  CHEST/LUNG:  CTA b/l, no rales, wheezes, or rhonchi  HEART:  rrr, S1, S2  ABDOMEN:  BS+, soft, nontender, nondistended  EXTREMITIES: no edema, cyanosis, or calf tenderness ; left wrist/forearm surgically wrapped - clean/dry/intact  NERVOUS SYSTEM: AA&Ox3, answers questions and follows commands appropriately    LABS:                        9.7    4.07  )-----------( 152      ( 29 Oct 2021 07:09 )             28.5     CBC Full  -  ( 29 Oct 2021 07:09 )  WBC Count : 4.07 K/uL  Hemoglobin : 9.7 g/dL  Hematocrit : 28.5 %  Platelet Count - Automated : 152 K/uL  Mean Cell Volume : 96.6 fl  Mean Cell Hemoglobin : 32.9 pg  Mean Cell Hemoglobin Concentration : 34.0 gm/dL  Auto Neutrophil # : 2.05 K/uL  Auto Lymphocyte # : 1.39 K/uL  Auto Monocyte # : 0.45 K/uL  Auto Eosinophil # : 0.15 K/uL  Auto Basophil # : 0.02 K/uL  Auto Neutrophil % : 50.3 %  Auto Lymphocyte % : 34.2 %  Auto Monocyte % : 11.1 %  Auto Eosinophil % : 3.7 %  Auto Basophil % : 0.5 %    29 Oct 2021 07:09    144    |  107    |  5      ----------------------------<  85     3.8     |  32     |  0.81     Ca    8.7        29 Oct 2021 07:09  Phos  1.8       29 Oct 2021 07:09  Mg     1.7       29 Oct 2021 07:09    TPro  6.2    /  Alb  2.5    /  TBili  0.3    /  DBili  x      /  AST  31     /  ALT  23     /  AlkPhos  37     29 Oct 2021 07:09    PT/INR - ( 29 Oct 2021 07:09 )   PT: 11.1 sec;   INR: 0.95 ratio         PTT - ( 29 Oct 2021 07:09 )  PTT:30.4 sec    CAPILLARY BLOOD GLUCOSE            Culture - Blood (collected 10-26-21 @ 22:00)  Source: .Blood Blood  Preliminary Report (10-27-21 @ 23:01):    No growth to date.    Culture - Blood (collected 10-26-21 @ 22:00)  Source: .Blood Blood  Preliminary Report (10-27-21 @ 22:01):    No growth to date.        RADIOLOGY & ADDITIONAL TESTS:    Personally reviewed.     Consultant(s) Notes Reviewed:  [x] YES  [ ] NO

## 2021-10-29 NOTE — PROGRESS NOTE ADULT - TIME BILLING
Note written by attending, see above.  Time spent: 37min. More than 50% of the visit was spent counseling the patient on medical condition - toxic metabolic encephalopathy, acute hypoxic and hypercarbic respiratory failure, sedating effects of medications, SUSANA, ATN, counseling on NSAID effect on renal function, counseling on abstaining from alcohol especially while on other sedating drugs like opiates.
Note written by attending, see above.  Time spent: 40min.
Note written by attending, see above.  Time spent: 40min. More than 50% of the visit was spent counseling the patient on medical condition - toxic metabolic encephalopathy, acute hypoxic and hypercarbic respiratory failure, sedating effects of medications, SUSANA, ATN.

## 2021-10-31 LAB
CULTURE RESULTS: SIGNIFICANT CHANGE UP
CULTURE RESULTS: SIGNIFICANT CHANGE UP
SPECIMEN SOURCE: SIGNIFICANT CHANGE UP
SPECIMEN SOURCE: SIGNIFICANT CHANGE UP

## 2021-11-03 ENCOUNTER — NON-APPOINTMENT (OUTPATIENT)
Age: 51
End: 2021-11-03

## 2021-11-03 LAB — DRUG SCREEN, SERUM: ABNORMAL

## 2022-01-05 ENCOUNTER — TRANSCRIPTION ENCOUNTER (OUTPATIENT)
Age: 52
End: 2022-01-05

## 2022-01-10 NOTE — ED BEHAVIORAL HEALTH ASSESSMENT NOTE - SUICIDALITY
Pulmonary/Critical Care consultation    Patient's name:  Peter Miguel  Medical Record Number: [id number]  Patient's account/billing number: [de-identified]  Patient's YOB: 1950  Age: 70 y.o. Date of Admission: 1/9/2022 11:26 PM  Date of Consult: 1/10/2022      Primary Care Physician: Jonny Perez MD      Code Status: Full Code    Reason for consult: Right middle lobe cavitary lesion in a patient with acute respiratory failure unspecified with hypoxemia and hypercarbia with ventriculitis      HISTORY OF PRESENT ILLNESS:   History was obtained from chart review and unobtainable from patient due to mental status and and being on the ventilator. Peter Miguel is a 70 y.o. white gentleman was admitted with altered mental status with complaining of having headaches. Patient was apparently found down at home which was felt to be could be as long as 48 hours. In the emergency room radiologic studies showed debris within the bilateral lateral ventricles of the brain which was felt to be related to hemorrhage or infection. Radiologic studies also revealed right middle lobe cavitary lesion. No history could be obtained from the patient. Patient had leukocytosis on microbiologic studies so far are unrevealing for any infectious process.   Patient is currently on ceftriaxone, vancomycin and ampicillin  Having moderate amount of clear secretions from the endotracheal tube  Hemodynamically stable not requiring any pressors  No purulent secretions or blood from the endotracheal tube  Patient is having a fever          Past Medical History:        Diagnosis Date    Anemia 2016    ON IRON    BPH with elevated PSA     post biopsy    Colon polyps 2016    BENEIGN REMOVED WITH COLONOSCOPY    Elevated Gina-Barr virus antibody titer 1989    NEVER TREATED FOR    Elevated fasting glucose     History of blood transfusion 2016    R/T INTESTINAL BLEEDING    History of chronic fatigue syndrome 1989    RESOLVED     History of GI bleed 2016    BROUGHT ON BY MEDS---NIACIN, FISH OIL AND VIT C    Hyperlipidemia 2014    (triglycerides-ELEVATED, TRYING TO CONTROL WITH DIET    Hypertension 1999    ON RX    Wears glasses        Past Surgical History:        Procedure Laterality Date    COLONOSCOPY  2000    internal hemorrhoids    COLONOSCOPY  02/02/2015    polypx1, repeat 5yrs due to family hx & hx polyps- brannon    COLONOSCOPY  12/09/2015    polyp X2  int. Hemorrhoids  partial prolapse of ileocecal valve    COLONOSCOPY  11/2016    COLONOSCOPY N/A 11/12/2020    COLONOSCOPY POLYPECTOMY SNARE/HOT BIOPSY performed by Leonor Meza DO at [address] [place] [address]    to correct flat arches (age 9)    PRE-MALIGNANT / BENIGN SKIN LESION EXCISION Right 10/15/2021    v-EXCISION Lesion Right cheek, Right nose, scalp, nasal tip performed by Nichol Huston MD at [place] [id number] Bilateral 12/04/2012    benign    PROSTATE BIOPSY Bilateral 07/2014    benign    PROSTATECTOMY  12/10/2019    LAPAROSCOPIC ROBOTIC SIMPLE PROSTATECTOMY     PROSTATECTOMY N/A 12/10/2019    XI LAPAROSCOPIC ROBOTIC SIMPLE PROSTATECTOMY performed by Richy Vasquez MD at [address]  12/8/15    Normal upper GI tract, no evidence of blood in upper GI tract, mild distal esophagitis    UPPER GASTROINTESTINAL ENDOSCOPY  11/2016       Allergies:    No Known Allergies      Home Meds:   Prior to Admission medications    Medication Sig Start Date End Date Taking?  Authorizing Provider   omeprazole (PRILOSEC) 20 MG delayed release capsule Take 1 capsule by mouth daily 3/25/21   Edmar Knott MD   atorvastatin (LIPITOR) 10 MG tablet take 1 tablet by mouth once daily 3/25/21   Edmar Knott MD   fenofibrate (TRIGLIDE) 160 MG tablet Take 1 tablet by mouth daily 3/22/21   Edmar Knott MD   hydroCHLOROthiazide (MICROZIDE) 12.5 MG capsule Take 1 capsule by mouth daily 3/9/21   Malcolm Domínguez MD   amLODIPine (NORVASC) 5 MG tablet Take 1 tablet by mouth daily 3/9/21   Malcolm Domínguez MD   benazepril (LOTENSIN) 40 MG tablet take 1 tablet by mouth once daily 2/18/21   Malcolm Domínguez MD   ferrous sulfate 325 (65 FE) MG tablet Take 325 mg by mouth 3 times daily (with meals)     Historical Provider, MD       Family History:       Problem Relation Age of Onset    Cancer Mother         LUNG    High Blood Pressure Mother     Diabetes Father         IDDM-LATE IN LIFE    Heart Disease Father         CHF    High Blood Pressure Father          Social History:   TOBACCO:   reports that he has been smoking pipe. He started smoking about 52 years ago. He has a 0.10 pack-year smoking history. He has never used smokeless tobacco.  ETOH:   reports current alcohol use. DRUGS:  reports no history of drug use. OCCUPATION:   Noncontributory          REVIEW OF SYSTEMS:    Review of Systems -   Could not be obtained secondary to the patient being on the ventilator          Physical Exam:    Vitals: BP (!) 144/98   Pulse 126   Temp 101.6 °F (38.7 °C) (Rectal)   Resp 27   Ht 5' 10\" (1.778 m)   SpO2 100%   BMI 28.70 kg/m²     Last Body weight:   Wt Readings from Last 3 Encounters:   01/09/22 200 lb (90.7 kg)   01/03/22 202 lb 6 oz (91.8 kg)   12/14/21 208 lb (94.3 kg)       Body Mass Index : Body mass index is 28.7 kg/m².       Intake and Output summary:     Intake/Output Summary (Last 24 hours) at 1/10/2022 1055  Last data filed at 1/10/2022 0509  Gross per 24 hour   Intake    Output 475 ml   Net -475 ml       Physical Examination:   PHYSICAL EXAMINATION:  Vitals:    01/10/22 0433 01/10/22 0530 01/10/22 0836 01/10/22 0841   BP: (!) 144/98      Pulse: 126      Resp: 16  27    Temp:  101.6 °F (38.7 °C)     TempSrc:  Rectal     SpO2: 100%      Height:    5' 10\" (1.778 m)     Constitutional: This is a well developed, well nourished, 25-29.9 - Overweight 70 y.o. year old male who is in no apparent distress. Head:normocephalic and atraumatic. EENT:   TIM. No conjunctival injections. Septum was midline, mucosa was without erythema, exudates or cobblestoning. No thrush was noted. Mallampati II (soft palate, uvula, fauces visible)  Neck: Supple without thyromegaly. No elevated JVP. Trachea was midline. Respiratory: Chest was symmetrical without dullness to percussion. Breath sounds bilaterally were clear to auscultation. There were no wheezes, rhonchi or rales. There is no intercostal retraction or use of accessory muscles. No egophony noted. Cardiovascular: Regular without murmur, clicks, gallops or rubs. Abdomen: Slightly rounded and soft without organomegaly. No rebound tenderness, rigidity or guarding was appreciated. Lymphatic: No lymphadenopathy. Musculoskeletal: Normal curvature of the spine. No gross muscle weakness. Extremities:  No lower extremity edema, ulcerations, tenderness, varicosities or erythema. Muscle size, tone and strength are normal.  No involuntary movements are noted. Skin:  Warm and dry. Good color, turgor and pigmentation. No lesions or scars. No cyanosis or clubbing  Neurological/Psychiatric: Patient is on the ventilator and unresponsive          Laboratory findings:-    CBC:   Recent Labs     01/09/22 1835   WBC 27.7*   HGB 13.7   *     BMP:    Recent Labs     01/09/22  1835 01/09/22  1835 01/10/22  0008 01/10/22  0104   *   < > 133*  --    K 3.7   < > 3.5*  --    CL 97*   < > 103  --    CO2 22   < > 18*  --    BUN 56*   < > 47*  --    CREATININE 1.35*  --  1.22* 1.41*   GLUCOSE 210*   < > 172*  --     < > = values in this interval not displayed. S. Calcium:  Recent Labs     01/10/22  0008   CALCIUM 8.5*     S. Ionized Calcium:No results for input(s): IONCA in the last 72 hours. S. Magnesium:  Recent Labs     01/10/22  0008   MG 2.5     S. Phosphorus:  Recent Labs     01/10/22  0008   PHOS 2.6     S. Glucose:  Recent Labs     01/10/22  0104 01/10/22  0511   POCGLU 211* 184*     Glycosylated hemoglobin A1C: No results for input(s): LABA1C in the last 72 hours. INR:   Recent Labs     01/10/22  0612   INR 1.1     Hepatic functions:   Recent Labs     01/09/22  1835   ALKPHOS 85   ALT 32   AST 48*   PROT 8.1   BILITOT 0.93   BILIDIR 0.31*   LABALBU 3.6     Pancreatic functions:No results for input(s): LACTA, AMYLASE in the last 72 hours. S. Lactic Acid: No results for input(s): LACTA in the last 72 hours. Cardiac enzymes:  Recent Labs     01/09/22  1835 01/10/22  0008   CKTOTAL 2,165* 1,959*   CKMB 28.4*  --      BNP:No results for input(s): BNP in the last 72 hours. Lipid profile: No results for input(s): CHOL, TRIG, HDL, LDL, LDLCALC in the last 72 hours. Blood Gases: No results found for: PH, PCO2, PO2, HCO3, O2SAT  Thyroid functions:   Lab Results   Component Value Date    TSH 0.50 01/10/2022            Microbiology:    Cultures during this admission:     Blood cultures:      [] None drawn      [] Negative             []  Positive (Details:  )  Urine Culture:        [] None drawn      [] Negative             []  Positive (Details:  )  Sputum Culture:   [] None drawn       [] Negative             []  Positive (Details:  )       Radiological reports:    CT ABDOMEN PELVIS WO CONTRAST Additional Contrast? None    Result Date: 1/10/2022  Examination is partially degraded by motion related artifact. Focal area of soft tissue thickening which appears to involve a loop of small bowel in the central right mid abdomen. This may be partially exaggerated by the patient motion. Localized infectious or inflammatory versus neoplastic process however is not excluded and short-term follow-up is recommended. Nondisplaced left 6th and 7th rib fractures. Masslike process exophytic from the lateral margin of the right-side of the prostate gland. Correlate with PSA values. Cholelithiasis.  RECOMMENDATIONS: Unavailable     XR ELBOW RIGHT (MIN 3 VIEWS)    Result Date: 1/10/2022  No acute osseous abnormality or dislocation involving the right elbow or right forearm. XR RADIUS ULNA RIGHT (2 VIEWS)    Result Date: 1/10/2022  No acute osseous abnormality or dislocation involving the right elbow or right forearm. XR KNEE RIGHT (3 VIEWS)    Result Date: 1/10/2022  1. No convincing acute osseous abnormality. 2. Degenerative changes of the right knee. 3. Trace joint effusion. 4. There appears to be soft tissue swelling anterior to the patella. CT HEAD WO CONTRAST    Result Date: 1/10/2022  Persistent layering debris within the lateral ventricles. There is mild dilatation of the ventricular system. Differential includes isodense subacute hemorrhage versus infection. Follow-up MRI brain with and without contrast is recommended for further evaluation. No acute traumatic injury of the facial bones. RECOMMENDATIONS: Unavailable     CT HEAD WO CONTRAST    Result Date: 1/9/2022  Debris within the bilateral lateral ventricles more pronounced on the right of uncertain clinical significance or etiology. MRI of the brain with contrast is recommended for further evaluation. Differential diagnosis would include subacute isodense hemorrhage versus possible infectious process. Neoplastic process is not considered due to the normal head CT 1 week ago. Bilateral mastoid effusion Limited evaluation of the cervical spine due to motion. Multilevel degenerative change in the cervical spine. Findings were discussed with Dr. Simone Conroy on 01/09/2022 at 7 20 p.mMarina Stephenson RECOMMENDATIONS: Unavailable     CT HEAD WO CONTRAST    Result Date: 1/3/2022  No acute intracranial abnormality. RECOMMENDATIONS: Unavailable     CT FACIAL BONES WO CONTRAST    Result Date: 1/10/2022  Persistent layering debris within the lateral ventricles. There is mild dilatation of the ventricular system. Differential includes isodense subacute hemorrhage versus infection. Follow-up MRI brain with and without contrast is recommended for further evaluation. No acute traumatic injury of the facial bones. RECOMMENDATIONS: Unavailable     CT CERVICAL SPINE WO CONTRAST    Result Date: 1/9/2022  Debris within the bilateral lateral ventricles more pronounced on the right of uncertain clinical significance or etiology. MRI of the brain with contrast is recommended for further evaluation. Differential diagnosis would include subacute isodense hemorrhage versus possible infectious process. Neoplastic process is not considered due to the normal head CT 1 week ago. Bilateral mastoid effusion Limited evaluation of the cervical spine due to motion. Multilevel degenerative change in the cervical spine. Findings were discussed with Dr. Demond Morillo on 01/09/2022 at 7 20 p.m. Real Eric RECOMMENDATIONS: Unavailable     XR CHEST PORTABLE    Result Date: 1/10/2022  1. Support devices as above. 2. No significant change in the focal opacity within the right lower lung. XR CHEST PORTABLE    Result Date: 1/10/2022  Right lung base opacity again identified, nonspecific. Please correlate exam findings and exam findings and history. XR CHEST PORTABLE    Result Date: 1/9/2022  Right lung base opacity. Please correlate exam findings. This could represent a focus of pneumonia versus underlying mass. CT CHEST PULMONARY EMBOLISM W CONTRAST    Result Date: 1/9/2022  No evidence of pulmonary embolism to the segmental level. Nonspecific opacification and when question air-fluid level involving the right middle lobe laterally. This demonstrates tiny locules of gas. Infection/Pneumonia and/or cavitary mass lesion may be considered. .  Consider pulmonology consultation. RECOMMENDATIONS: Unavailable     CTA HEAD NECK W CONTRAST    Result Date: 1/10/2022  Diminutive anterior cerebral arteries and posterior cerebral arteries.  Mildly beaded appearance to the M1 portions of the middle cerebral arteries bilaterally with intermittent foci of mild stenosis. Subsequent diminutive middle cerebral artery branches distally and this is more pronounced on the left compared to the right. These findings are of uncertain etiology although vasculitis is a consideration. RECOMMENDATIONS: Unavailable     CT LUMBAR SPINE TRAUMA RECONSTRUCTION    Result Date: 1/10/2022  Unremarkable non-contrast CT of the lumbar spine. Assessment and Plan       IMPRESSION:   1. Altered mental status, unspecified altered mental status type    2. Septicemia ([address]        Active Problems:    JOZEF (acute kidney injury) ([address]    Acute respiratory failure (HCC)    Rhabdomyolysis    Altered mental status    Septicemia ([address]    Closed fracture of multiple ribs of left side    Abrasion of left cornea    Pneumonia of right middle lobe due to infectious organism    Altered mental state  Resolved Problems:    Traumatic rhabdomyolysis (HCC)  The cavitary lesion in the right middle lobe could be aspiration pneumonia in somebody who may have been unresponsive for a long time. Other considerations would be infected bleb. Mild hyponatremia  Mild hypokalemia  Acute kidney injury, better  Hyperglycemia  Mild elevation of procalcitonin  Acute respiratory alkalosis with partial compensation  Rhabdomyolysis, improving  Leukocytosis      PLAN:         Continue ventilator support  Follow-up on the microbiologic data  Continue current antibiotic therapy  Recommend flu vaccination in the fall annually. Recommend COVID-19 vaccination  Patient is on EPC cuffs for DVT prophylaxis  Continue to monitor the findings on the right lung radiologically    Thank you for having us involved in the care of your patient. Please call us if you have any questions or concerns.       Ruth Nickerson MD, M.D.            1/10/2022, 10:55 AM None known in lifetime

## 2022-01-21 ENCOUNTER — APPOINTMENT (OUTPATIENT)
Dept: INTERNAL MEDICINE | Facility: CLINIC | Age: 52
End: 2022-01-21
Payer: COMMERCIAL

## 2022-01-21 VITALS
HEIGHT: 61 IN | DIASTOLIC BLOOD PRESSURE: 70 MMHG | RESPIRATION RATE: 14 BRPM | BODY MASS INDEX: 16.05 KG/M2 | WEIGHT: 85 LBS | SYSTOLIC BLOOD PRESSURE: 104 MMHG

## 2022-01-21 DIAGNOSIS — J01.90 ACUTE SINUSITIS, UNSPECIFIED: ICD-10-CM

## 2022-01-21 PROCEDURE — 99213 OFFICE O/P EST LOW 20 MIN: CPT

## 2022-01-21 RX ORDER — AMOXICILLIN AND CLAVULANATE POTASSIUM 875; 125 MG/1; MG/1
875-125 TABLET, COATED ORAL
Qty: 20 | Refills: 0 | Status: ACTIVE | COMMUNITY
Start: 2022-01-21 | End: 1900-01-01

## 2022-01-21 NOTE — HISTORY OF PRESENT ILLNESS
[FreeTextEntry8] : Pt with complaint of ear pain, sore throat, hoarse voice, and headache for the past 2 weeks. The left ear is worse than the left. Pt has been testing for covid with at home rapid tests and they have been consistently negative. Pt has only been taking tylenol. No fever/chills.

## 2022-01-21 NOTE — PHYSICAL EXAM
[No Acute Distress] : no acute distress [Well-Appearing] : well-appearing [Normal Voice/Communication] : normal voice/communication [Normal Oropharynx] : the oropharynx was normal [Normal TMs] : both tympanic membranes were normal [No Respiratory Distress] : no respiratory distress  [No Accessory Muscle Use] : no accessory muscle use [Clear to Auscultation] : lungs were clear to auscultation bilaterally [Normal Rate] : normal rate  [Regular Rhythm] : with a regular rhythm [No Murmur] : no murmur heard [No Focal Deficits] : no focal deficits [Alert and Oriented x3] : oriented to person, place, and time [de-identified] : (+) TTP b/l maxillary sinuses

## 2022-02-03 NOTE — DIETITIAN INITIAL EVALUATION ADULT. - PROBLEM SELECTOR PROBLEM 4
When Was Isotretinoin Started?: 10/21
Your Weight In Pounds:: 160
Additional Comments (Use Complete Sentences): Starting month five.
Need for prophylactic measure

## 2022-04-12 NOTE — ED BEHAVIORAL HEALTH ASSESSMENT NOTE - NS ED BHA MED ROS GENITOURINARY
MA's: I am going to order prolia for this patient, sent to [facility] (patient's regular pharmacy is [facility]). Please let me know if there is any additional paperwork to be done or if my order needs to be corrected. If paperwork needs to be signed, please see if another PCP in the clinic can sign as [facility] and cannot be at the [facility] during work hours.    Called May to let her know the results of the DEXA scan. May stated she is doing well on supplemental oxygen; unfortunately her cane was stolen at [facility] and had to be replaced.    Patient stated that her chronic pain doctor told her she had a compound fracture.    Suggested her to continue taking vitamin D 2000U; I will also be starting prolia injections. Alendronate/other bisphosphonates are contraindicated due to her chronic dysphagia issues and hiatal hernia as confirmed via barium swallow study recently. May expressed understanding of the plan via the teachback method and thanked me for the call. Will be ordering prolia injections now. Will see the patient at her next appointment.    Sri Escudero MD, MPH  [facility], PGY-2     No complaints

## 2022-05-16 ENCOUNTER — APPOINTMENT (OUTPATIENT)
Dept: CT IMAGING | Facility: CLINIC | Age: 52
End: 2022-05-16
Payer: COMMERCIAL

## 2022-05-16 ENCOUNTER — EMERGENCY (EMERGENCY)
Facility: HOSPITAL | Age: 52
LOS: 1 days | Discharge: HOME | End: 2022-05-16
Payer: COMMERCIAL

## 2022-05-16 ENCOUNTER — OUTPATIENT (OUTPATIENT)
Dept: OUTPATIENT SERVICES | Facility: HOSPITAL | Age: 52
LOS: 1 days | End: 2022-05-16
Payer: COMMERCIAL

## 2022-05-16 VITALS
TEMPERATURE: 98 F | SYSTOLIC BLOOD PRESSURE: 127 MMHG | HEART RATE: 91 BPM | RESPIRATION RATE: 18 BRPM | OXYGEN SATURATION: 97 % | HEIGHT: 61 IN | DIASTOLIC BLOOD PRESSURE: 86 MMHG

## 2022-05-16 DIAGNOSIS — Z90.49 ACQUIRED ABSENCE OF OTHER SPECIFIED PARTS OF DIGESTIVE TRACT: Chronic | ICD-10-CM

## 2022-05-16 DIAGNOSIS — N80.9 ENDOMETRIOSIS, UNSPECIFIED: Chronic | ICD-10-CM

## 2022-05-16 DIAGNOSIS — Z98.890 OTHER SPECIFIED POSTPROCEDURAL STATES: Chronic | ICD-10-CM

## 2022-05-16 DIAGNOSIS — Z00.8 ENCOUNTER FOR OTHER GENERAL EXAMINATION: ICD-10-CM

## 2022-05-16 PROCEDURE — 74177 CT ABD & PELVIS W/CONTRAST: CPT

## 2022-05-16 PROCEDURE — 74177 CT ABD & PELVIS W/CONTRAST: CPT | Mod: 26

## 2022-05-16 PROCEDURE — L9991: CPT

## 2022-05-16 RX ORDER — SODIUM CHLORIDE 9 MG/ML
1000 INJECTION INTRAMUSCULAR; INTRAVENOUS; SUBCUTANEOUS ONCE
Refills: 0 | Status: DISCONTINUED | OUTPATIENT
Start: 2022-05-16 | End: 2022-05-20

## 2022-05-16 NOTE — ED ADULT TRIAGE NOTE - GLASGOW COMA SCALE: BEST VERBAL RESPONSE, MLM
----- Message from Rosangela Gallardo sent at 4/5/2017 11:07 AM CDT -----  Patient received a notification from bernadine stated the doctor requested a service for her. She doesn't recall what the service is. Please call at [phone] Thank you!   (V5) oriented

## 2022-05-16 NOTE — ED PROVIDER NOTE - PROGRESS NOTE DETAILS
Pt eloped from waiting room. Told RN staff she dd not want to wait. Dr Supa Husain MD, Facep Discussed with Dr Cowart will follow up  Ganga Husain MD, Facep PT  seen via Remote tele triage/qdoc during period of high volume w anticipation of full evaluation once pt is transferred to main ED. Initial orders were placed and pt was waiting for transfer. In conversation with nursing staff pt eloped from ED waiting room and such final eval was not performed.  Ganga Husain MD, Facep

## 2022-05-16 NOTE — ED PROVIDER NOTE - RAPID ASSESSMENT
51y F presents to the ED c/o abd pain a/w N/V. Pt went to her GI and got a CT scan today showing a blockage and twisting of the intestines. Pt's Dr. Clarke Cowart referred her to come to the ED in order to be evaluated for surgery by Dr. Kothari. Pt is well appearing in triage.    Elfego DEJESUS (Floibjad) have documented this rapid assessment note under the dictation of Ganga Husain) which has been reviewed and affirmed to be accurate. Patient was seen as a QPA patient. The patient will be seen and further worked up in the main emergency department and their care will be completed by the main emergency department team along with a thorough physical exam. Receiving team will follow up on labs, analgesia, any clinical imaging, reassess and disposition as clinically indicated, all decisions regarding the progression of care will be made at their discretion. 51y F presents to the ED c/o abd pain a/w N/V. Pt went to her GI and got a CT scan today showing a blockage and twisting of the intestines. Pt's Dr. Clarke Cowart referred her to come to the ED in order to be evaluated for surgery by Dr. Kothari. Pt adult female thin appearing otherwise nad.    IElfego (Scribe) have documented this rapid assessment note under the dictation of Ganga Husain (MD) which has been reviewed and affirmed to be accurate. Patient was seen as a QPA patient. The patient will be seen and further worked up in the main emergency department and their care will be completed by the main emergency department team along with a thorough physical exam. Receiving team will follow up on labs, analgesia, any clinical imaging, reassess and disposition as clinically indicated, all decisions regarding the progression of care will be made at their discretion.    Patient seen as Triage/Tele/Q-Doc w scribe under my supervision. Full evaluation, to be performed once pt is transferred to Main ED.  Ganga Husain MD, Facep

## 2022-05-16 NOTE — ED ADULT TRIAGE NOTE - PRO INTERPRETER NEED 2
Anesthesia Post Op Assessment  		(    ) Intubated           TV _SV____	Rate _14____	FiO2__NC 3L___  		(  x  ) Patent airway. Full return of protective reflexes  		( x   )Full recovery from anesthesia/sedation to baseline status      Cardiovascular Function:  		BP:  110/54		                  Pulse:	65	                  RR:	14	                  Temp:	97.6	                  O2Sat:99%      Mental Status:  	        ( x   ) awake		  (    ) alert		 (    ) drowsy	               (    ) sedated      Nausea/Vomiting:  		(    ) Yes, See post-op orders		   (  x  ) No      Pain Scale: (0-10):			Treatment:     (    ) None	            (  x  ) See Post-Op/PCA Orders      Post-operative Fluids: 	   (    ) Oral	          (  x  ) See post-op Orders        Comments:    Uneventful. No complications from anesthesia.  Discharge when criteria met.
English

## 2022-05-21 NOTE — ED ADULT NURSE NOTE - CCCP TRG CHIEF CMPLNT
You were evaluated in the emergency department for worsening ear pain  It was found that you have a large amount of wax buildup in both ears  A majority of the impacted wax was able to be expelled in the ED  For the next few days, you can use Debrox ear drops  Follow the instructions on the box  Follow-up with your doctor  Do not hesitate to be re-evaluated in the ED for any concerning signs or symptoms 
ear pain/headache

## 2023-02-02 ENCOUNTER — INPATIENT (INPATIENT)
Facility: HOSPITAL | Age: 53
LOS: 7 days | Discharge: HOME | End: 2023-02-10
Attending: INTERNAL MEDICINE | Admitting: INTERNAL MEDICINE
Payer: COMMERCIAL

## 2023-02-02 ENCOUNTER — EMERGENCY (EMERGENCY)
Facility: HOSPITAL | Age: 53
LOS: 1 days | Discharge: ANOTHER HOSPITAL | End: 2023-02-02
Attending: EMERGENCY MEDICINE | Admitting: EMERGENCY MEDICINE
Payer: COMMERCIAL

## 2023-02-02 VITALS
OXYGEN SATURATION: 99 % | SYSTOLIC BLOOD PRESSURE: 124 MMHG | HEIGHT: 61 IN | RESPIRATION RATE: 18 BRPM | HEART RATE: 78 BPM | TEMPERATURE: 98 F | WEIGHT: 80.03 LBS | DIASTOLIC BLOOD PRESSURE: 88 MMHG

## 2023-02-02 VITALS
HEIGHT: 61 IN | HEART RATE: 69 BPM | TEMPERATURE: 98 F | OXYGEN SATURATION: 100 % | DIASTOLIC BLOOD PRESSURE: 67 MMHG | RESPIRATION RATE: 18 BRPM | SYSTOLIC BLOOD PRESSURE: 100 MMHG

## 2023-02-02 VITALS
TEMPERATURE: 98 F | HEART RATE: 70 BPM | OXYGEN SATURATION: 98 % | SYSTOLIC BLOOD PRESSURE: 102 MMHG | RESPIRATION RATE: 18 BRPM | DIASTOLIC BLOOD PRESSURE: 68 MMHG

## 2023-02-02 DIAGNOSIS — Z90.49 ACQUIRED ABSENCE OF OTHER SPECIFIED PARTS OF DIGESTIVE TRACT: Chronic | ICD-10-CM

## 2023-02-02 DIAGNOSIS — Z98.890 OTHER SPECIFIED POSTPROCEDURAL STATES: Chronic | ICD-10-CM

## 2023-02-02 DIAGNOSIS — S02.609A FRACTURE OF MANDIBLE, UNSPECIFIED, INITIAL ENCOUNTER FOR CLOSED FRACTURE: ICD-10-CM

## 2023-02-02 DIAGNOSIS — Z87.898 PERSONAL HISTORY OF OTHER SPECIFIED CONDITIONS: ICD-10-CM

## 2023-02-02 DIAGNOSIS — N80.9 ENDOMETRIOSIS, UNSPECIFIED: Chronic | ICD-10-CM

## 2023-02-02 DIAGNOSIS — R63.0 ANOREXIA: ICD-10-CM

## 2023-02-02 LAB
ALBUMIN SERPL ELPH-MCNC: 4.3 G/DL — SIGNIFICANT CHANGE UP (ref 3.3–5)
ALP SERPL-CCNC: 52 U/L — SIGNIFICANT CHANGE UP (ref 30–120)
ALT FLD-CCNC: 30 U/L DA — SIGNIFICANT CHANGE UP (ref 10–60)
ANION GAP SERPL CALC-SCNC: 10 MMOL/L — SIGNIFICANT CHANGE UP (ref 5–17)
APTT BLD: 29.4 SEC — SIGNIFICANT CHANGE UP (ref 27.5–35.5)
AST SERPL-CCNC: 36 U/L — SIGNIFICANT CHANGE UP (ref 10–40)
BASOPHILS # BLD AUTO: 0.03 K/UL — SIGNIFICANT CHANGE UP (ref 0–0.2)
BASOPHILS NFR BLD AUTO: 0.4 % — SIGNIFICANT CHANGE UP (ref 0–2)
BILIRUB SERPL-MCNC: 0.4 MG/DL — SIGNIFICANT CHANGE UP (ref 0.2–1.2)
BUN SERPL-MCNC: 15 MG/DL — SIGNIFICANT CHANGE UP (ref 7–23)
CALCIUM SERPL-MCNC: 8.5 MG/DL — SIGNIFICANT CHANGE UP (ref 8.4–10.5)
CHLORIDE SERPL-SCNC: 99 MMOL/L — SIGNIFICANT CHANGE UP (ref 96–108)
CO2 SERPL-SCNC: 30 MMOL/L — SIGNIFICANT CHANGE UP (ref 22–31)
CREAT SERPL-MCNC: 0.89 MG/DL — SIGNIFICANT CHANGE UP (ref 0.5–1.3)
EGFR: 78 ML/MIN/1.73M2 — SIGNIFICANT CHANGE UP
EOSINOPHIL # BLD AUTO: 0.06 K/UL — SIGNIFICANT CHANGE UP (ref 0–0.5)
EOSINOPHIL NFR BLD AUTO: 0.9 % — SIGNIFICANT CHANGE UP (ref 0–6)
GLUCOSE SERPL-MCNC: 85 MG/DL — SIGNIFICANT CHANGE UP (ref 70–99)
HCT VFR BLD CALC: 40.2 % — SIGNIFICANT CHANGE UP (ref 34.5–45)
HGB BLD-MCNC: 14.5 G/DL — SIGNIFICANT CHANGE UP (ref 11.5–15.5)
IMM GRANULOCYTES NFR BLD AUTO: 0.3 % — SIGNIFICANT CHANGE UP (ref 0–0.9)
INR BLD: 1.13 RATIO — SIGNIFICANT CHANGE UP (ref 0.88–1.16)
LIDOCAIN IGE QN: 109 U/L — SIGNIFICANT CHANGE UP (ref 73–393)
LYMPHOCYTES # BLD AUTO: 1.64 K/UL — SIGNIFICANT CHANGE UP (ref 1–3.3)
LYMPHOCYTES # BLD AUTO: 23.4 % — SIGNIFICANT CHANGE UP (ref 13–44)
MCHC RBC-ENTMCNC: 32.3 PG — SIGNIFICANT CHANGE UP (ref 27–34)
MCHC RBC-ENTMCNC: 36.1 GM/DL — HIGH (ref 32–36)
MCV RBC AUTO: 89.5 FL — SIGNIFICANT CHANGE UP (ref 80–100)
MONOCYTES # BLD AUTO: 0.44 K/UL — SIGNIFICANT CHANGE UP (ref 0–0.9)
MONOCYTES NFR BLD AUTO: 6.3 % — SIGNIFICANT CHANGE UP (ref 2–14)
NEUTROPHILS # BLD AUTO: 4.82 K/UL — SIGNIFICANT CHANGE UP (ref 1.8–7.4)
NEUTROPHILS NFR BLD AUTO: 68.7 % — SIGNIFICANT CHANGE UP (ref 43–77)
NRBC # BLD: 0 /100 WBCS — SIGNIFICANT CHANGE UP (ref 0–0)
PLATELET # BLD AUTO: 261 K/UL — SIGNIFICANT CHANGE UP (ref 150–400)
POTASSIUM SERPL-MCNC: 3.5 MMOL/L — SIGNIFICANT CHANGE UP (ref 3.5–5.3)
POTASSIUM SERPL-SCNC: 3.5 MMOL/L — SIGNIFICANT CHANGE UP (ref 3.5–5.3)
PROT SERPL-MCNC: 7.3 G/DL — SIGNIFICANT CHANGE UP (ref 6–8.3)
PROTHROM AB SERPL-ACNC: 13.4 SEC — SIGNIFICANT CHANGE UP (ref 10.5–13.4)
RBC # BLD: 4.49 M/UL — SIGNIFICANT CHANGE UP (ref 3.8–5.2)
RBC # FLD: 11.2 % — SIGNIFICANT CHANGE UP (ref 10.3–14.5)
SARS-COV-2 RNA SPEC QL NAA+PROBE: SIGNIFICANT CHANGE UP
SODIUM SERPL-SCNC: 139 MMOL/L — SIGNIFICANT CHANGE UP (ref 135–145)
WBC # BLD: 7.01 K/UL — SIGNIFICANT CHANGE UP (ref 3.8–10.5)
WBC # FLD AUTO: 7.01 K/UL — SIGNIFICANT CHANGE UP (ref 3.8–10.5)

## 2023-02-02 PROCEDURE — 96365 THER/PROPH/DIAG IV INF INIT: CPT

## 2023-02-02 PROCEDURE — 99285 EMERGENCY DEPT VISIT HI MDM: CPT

## 2023-02-02 PROCEDURE — 70450 CT HEAD/BRAIN W/O DYE: CPT | Mod: 26,MA

## 2023-02-02 PROCEDURE — 99285 EMERGENCY DEPT VISIT HI MDM: CPT | Mod: 25

## 2023-02-02 PROCEDURE — 85025 COMPLETE CBC W/AUTO DIFF WBC: CPT

## 2023-02-02 PROCEDURE — 96361 HYDRATE IV INFUSION ADD-ON: CPT

## 2023-02-02 PROCEDURE — 87635 SARS-COV-2 COVID-19 AMP PRB: CPT

## 2023-02-02 PROCEDURE — 70486 CT MAXILLOFACIAL W/O DYE: CPT | Mod: MA

## 2023-02-02 PROCEDURE — 85730 THROMBOPLASTIN TIME PARTIAL: CPT

## 2023-02-02 PROCEDURE — 70486 CT MAXILLOFACIAL W/O DYE: CPT | Mod: 26,MA

## 2023-02-02 PROCEDURE — 86850 RBC ANTIBODY SCREEN: CPT

## 2023-02-02 PROCEDURE — 72125 CT NECK SPINE W/O DYE: CPT | Mod: 26,MA

## 2023-02-02 PROCEDURE — 80053 COMPREHEN METABOLIC PANEL: CPT

## 2023-02-02 PROCEDURE — 70450 CT HEAD/BRAIN W/O DYE: CPT | Mod: MA

## 2023-02-02 PROCEDURE — 86901 BLOOD TYPING SEROLOGIC RH(D): CPT

## 2023-02-02 PROCEDURE — 36415 COLL VENOUS BLD VENIPUNCTURE: CPT

## 2023-02-02 PROCEDURE — 96375 TX/PRO/DX INJ NEW DRUG ADDON: CPT

## 2023-02-02 PROCEDURE — 83690 ASSAY OF LIPASE: CPT

## 2023-02-02 PROCEDURE — 72125 CT NECK SPINE W/O DYE: CPT | Mod: MA

## 2023-02-02 PROCEDURE — 86900 BLOOD TYPING SEROLOGIC ABO: CPT

## 2023-02-02 PROCEDURE — 85610 PROTHROMBIN TIME: CPT

## 2023-02-02 RX ORDER — ZOLPIDEM TARTRATE 10 MG/1
12.5 TABLET ORAL AT BEDTIME
Refills: 0 | Status: DISCONTINUED | OUTPATIENT
Start: 2023-02-02 | End: 2023-02-02

## 2023-02-02 RX ORDER — CEFAZOLIN SODIUM 1 G
1000 VIAL (EA) INJECTION ONCE
Refills: 0 | Status: COMPLETED | OUTPATIENT
Start: 2023-02-02 | End: 2023-02-02

## 2023-02-02 RX ORDER — DIAZEPAM 5 MG
20 TABLET ORAL DAILY
Refills: 0 | Status: DISCONTINUED | OUTPATIENT
Start: 2023-02-02 | End: 2023-02-02

## 2023-02-02 RX ORDER — AMPICILLIN SODIUM AND SULBACTAM SODIUM 250; 125 MG/ML; MG/ML
3 INJECTION, POWDER, FOR SUSPENSION INTRAMUSCULAR; INTRAVENOUS EVERY 6 HOURS
Refills: 0 | Status: DISCONTINUED | OUTPATIENT
Start: 2023-02-03 | End: 2023-02-10

## 2023-02-02 RX ORDER — SODIUM CHLORIDE 9 MG/ML
1000 INJECTION INTRAMUSCULAR; INTRAVENOUS; SUBCUTANEOUS ONCE
Refills: 0 | Status: COMPLETED | OUTPATIENT
Start: 2023-02-02 | End: 2023-02-02

## 2023-02-02 RX ORDER — DIAZEPAM 5 MG
10 TABLET ORAL THREE TIMES A DAY
Refills: 0 | Status: DISCONTINUED | OUTPATIENT
Start: 2023-02-02 | End: 2023-02-02

## 2023-02-02 RX ORDER — DIAZEPAM 5 MG
2.5 TABLET ORAL ONCE
Refills: 0 | Status: DISCONTINUED | OUTPATIENT
Start: 2023-02-02 | End: 2023-02-02

## 2023-02-02 RX ORDER — FLUOXETINE HCL 10 MG
1 CAPSULE ORAL
Qty: 0 | Refills: 0 | DISCHARGE

## 2023-02-02 RX ORDER — HYDROMORPHONE HYDROCHLORIDE 2 MG/ML
1 INJECTION INTRAMUSCULAR; INTRAVENOUS; SUBCUTANEOUS ONCE
Refills: 0 | Status: DISCONTINUED | OUTPATIENT
Start: 2023-02-02 | End: 2023-02-02

## 2023-02-02 RX ORDER — OXYCODONE HYDROCHLORIDE 5 MG/1
5 TABLET ORAL EVERY 4 HOURS
Refills: 0 | Status: DISCONTINUED | OUTPATIENT
Start: 2023-02-02 | End: 2023-02-04

## 2023-02-02 RX ORDER — SODIUM CHLORIDE 9 MG/ML
1000 INJECTION, SOLUTION INTRAVENOUS
Refills: 0 | Status: DISCONTINUED | OUTPATIENT
Start: 2023-02-02 | End: 2023-02-03

## 2023-02-02 RX ORDER — TRAZODONE HCL 50 MG
100 TABLET ORAL AT BEDTIME
Refills: 0 | Status: DISCONTINUED | OUTPATIENT
Start: 2023-02-02 | End: 2023-02-10

## 2023-02-02 RX ORDER — MORPHINE SULFATE 50 MG/1
4 CAPSULE, EXTENDED RELEASE ORAL ONCE
Refills: 0 | Status: DISCONTINUED | OUTPATIENT
Start: 2023-02-02 | End: 2023-02-02

## 2023-02-02 RX ORDER — AMPICILLIN SODIUM AND SULBACTAM SODIUM 250; 125 MG/ML; MG/ML
INJECTION, POWDER, FOR SUSPENSION INTRAMUSCULAR; INTRAVENOUS
Refills: 0 | Status: DISCONTINUED | OUTPATIENT
Start: 2023-02-02 | End: 2023-02-10

## 2023-02-02 RX ORDER — DIAZEPAM 5 MG
20 TABLET ORAL
Refills: 0 | Status: DISCONTINUED | OUTPATIENT
Start: 2023-02-03 | End: 2023-02-07

## 2023-02-02 RX ORDER — ACETAMINOPHEN 500 MG
1000 TABLET ORAL EVERY 6 HOURS
Refills: 0 | Status: DISCONTINUED | OUTPATIENT
Start: 2023-02-02 | End: 2023-02-03

## 2023-02-02 RX ORDER — IBUPROFEN 200 MG
600 TABLET ORAL EVERY 6 HOURS
Refills: 0 | Status: DISCONTINUED | OUTPATIENT
Start: 2023-02-02 | End: 2023-02-05

## 2023-02-02 RX ORDER — ACETAMINOPHEN 500 MG
1000 TABLET ORAL ONCE
Refills: 0 | Status: COMPLETED | OUTPATIENT
Start: 2023-02-02 | End: 2023-02-02

## 2023-02-02 RX ORDER — OXYCODONE AND ACETAMINOPHEN 5; 325 MG/1; MG/1
1 TABLET ORAL
Qty: 0 | Refills: 0 | DISCHARGE

## 2023-02-02 RX ORDER — ONDANSETRON 8 MG/1
4 TABLET, FILM COATED ORAL ONCE
Refills: 0 | Status: COMPLETED | OUTPATIENT
Start: 2023-02-02 | End: 2023-02-02

## 2023-02-02 RX ORDER — OXYCODONE HYDROCHLORIDE 5 MG/1
5 TABLET ORAL ONCE
Refills: 0 | Status: DISCONTINUED | OUTPATIENT
Start: 2023-02-02 | End: 2023-02-02

## 2023-02-02 RX ORDER — AMPICILLIN SODIUM AND SULBACTAM SODIUM 250; 125 MG/ML; MG/ML
3 INJECTION, POWDER, FOR SUSPENSION INTRAMUSCULAR; INTRAVENOUS ONCE
Refills: 0 | Status: COMPLETED | OUTPATIENT
Start: 2023-02-02 | End: 2023-02-02

## 2023-02-02 RX ORDER — LANOLIN ALCOHOL/MO/W.PET/CERES
3 CREAM (GRAM) TOPICAL AT BEDTIME
Refills: 0 | Status: DISCONTINUED | OUTPATIENT
Start: 2023-02-02 | End: 2023-02-10

## 2023-02-02 RX ORDER — DIAZEPAM 5 MG
10 TABLET ORAL
Refills: 0 | Status: DISCONTINUED | OUTPATIENT
Start: 2023-02-03 | End: 2023-02-07

## 2023-02-02 RX ORDER — ZOLPIDEM TARTRATE 10 MG/1
10 TABLET ORAL AT BEDTIME
Refills: 0 | Status: DISCONTINUED | OUTPATIENT
Start: 2023-02-02 | End: 2023-02-08

## 2023-02-02 RX ORDER — FLUOXETINE HCL 10 MG
40 CAPSULE ORAL DAILY
Refills: 0 | Status: DISCONTINUED | OUTPATIENT
Start: 2023-02-02 | End: 2023-02-10

## 2023-02-02 RX ORDER — CHLORHEXIDINE GLUCONATE 213 G/1000ML
15 SOLUTION TOPICAL
Refills: 0 | Status: DISCONTINUED | OUTPATIENT
Start: 2023-02-02 | End: 2023-02-10

## 2023-02-02 RX ADMIN — Medication 10 MILLIGRAM(S): at 21:54

## 2023-02-02 RX ADMIN — ZOLPIDEM TARTRATE 10 MILLIGRAM(S): 10 TABLET ORAL at 23:21

## 2023-02-02 RX ADMIN — Medication 3 MILLIGRAM(S): at 23:21

## 2023-02-02 RX ADMIN — HYDROMORPHONE HYDROCHLORIDE 1 MILLIGRAM(S): 2 INJECTION INTRAMUSCULAR; INTRAVENOUS; SUBCUTANEOUS at 18:19

## 2023-02-02 RX ADMIN — SODIUM CHLORIDE 60 MILLILITER(S): 9 INJECTION, SOLUTION INTRAVENOUS at 23:41

## 2023-02-02 RX ADMIN — MORPHINE SULFATE 4 MILLIGRAM(S): 50 CAPSULE, EXTENDED RELEASE ORAL at 15:36

## 2023-02-02 RX ADMIN — ONDANSETRON 4 MILLIGRAM(S): 8 TABLET, FILM COATED ORAL at 15:37

## 2023-02-02 RX ADMIN — AMPICILLIN SODIUM AND SULBACTAM SODIUM 200 GRAM(S): 250; 125 INJECTION, POWDER, FOR SUSPENSION INTRAMUSCULAR; INTRAVENOUS at 22:29

## 2023-02-02 RX ADMIN — SODIUM CHLORIDE 1000 MILLILITER(S): 9 INJECTION INTRAMUSCULAR; INTRAVENOUS; SUBCUTANEOUS at 12:45

## 2023-02-02 RX ADMIN — SODIUM CHLORIDE 1000 MILLILITER(S): 9 INJECTION INTRAMUSCULAR; INTRAVENOUS; SUBCUTANEOUS at 15:38

## 2023-02-02 RX ADMIN — OXYCODONE HYDROCHLORIDE 5 MILLIGRAM(S): 5 TABLET ORAL at 11:07

## 2023-02-02 RX ADMIN — OXYCODONE HYDROCHLORIDE 5 MILLIGRAM(S): 5 TABLET ORAL at 22:30

## 2023-02-02 RX ADMIN — MORPHINE SULFATE 4 MILLIGRAM(S): 50 CAPSULE, EXTENDED RELEASE ORAL at 19:40

## 2023-02-02 RX ADMIN — HYDROMORPHONE HYDROCHLORIDE 1 MILLIGRAM(S): 2 INJECTION INTRAMUSCULAR; INTRAVENOUS; SUBCUTANEOUS at 17:20

## 2023-02-02 RX ADMIN — Medication 100 MILLIGRAM(S): at 11:43

## 2023-02-02 RX ADMIN — Medication 400 MILLIGRAM(S): at 17:20

## 2023-02-02 RX ADMIN — Medication 100 MILLIGRAM(S): at 23:48

## 2023-02-02 RX ADMIN — OXYCODONE HYDROCHLORIDE 5 MILLIGRAM(S): 5 TABLET ORAL at 10:07

## 2023-02-02 RX ADMIN — OXYCODONE HYDROCHLORIDE 5 MILLIGRAM(S): 5 TABLET ORAL at 21:54

## 2023-02-02 RX ADMIN — Medication 1000 MILLIGRAM(S): at 18:20

## 2023-02-02 RX ADMIN — SODIUM CHLORIDE 1000 MILLILITER(S): 9 INJECTION INTRAMUSCULAR; INTRAVENOUS; SUBCUTANEOUS at 11:45

## 2023-02-02 RX ADMIN — Medication 2.5 MILLIGRAM(S): at 13:04

## 2023-02-02 RX ADMIN — Medication 1000 MILLIGRAM(S): at 12:08

## 2023-02-02 NOTE — ED PROVIDER NOTE - PROGRESS NOTE DETAILS
TEETEE Rosado PGY2 OMFS at bedside to tack the chin laceration. pt added on to the OR schedule for repair tomorrow.

## 2023-02-02 NOTE — ED PROVIDER NOTE - PHYSICAL EXAMINATION
pt with diffuse jaw pain and difficulty with jaw movement  laceration noted to chin no active bleeding at this time   due to poor movement of jaw difficult to obtain detailed dental exam  no midline C/T/L  FROM of bilateral UE and LE with no pain on ROM NVI x 4  no pelvic TTP

## 2023-02-02 NOTE — H&P ADULT - NSHPREVIEWOFSYSTEMS_GEN_ALL_CORE
REVIEW OF SYSTEMS    General:	aaox3, well-appearing, patient currently agitated    Skin: negative  	  Ophthalmologic: negative  	  ENMT:	mandibular fracture    Respiratory and Thorax: negative  	  Cardiovascular:	negative    Gastrointestinal:	GERD    Genitourinary:	negative    Musculoskeletal:	negative    Neurological:	negative    Psychiatric:	severe anxiety, anorexia    Hematology/Lymphatics:	negative    Endocrine: negative    Allergic/Immunologic:	negative

## 2023-02-02 NOTE — H&P ADULT - NSHPPHYSICALEXAM_GEN_ALL_CORE
CONSTITUTIONAL: Well groomed, no apparent distress    EYES: PERRL and symmetric, EOMI, No conjunctival or scleral injection, non-icteric    ENMT:   ears: canals clear, no signs of hearing discrepancies  nose: nares clear, no rhinorrhea noted  IOE: adult dentition without signs of decay. no edema, erythema, or active infection. FOM soft, glands productive, uvula midline.              NECK: Supple, symmetric and without tracheal deviation     RESP: No respiratory distress, no use of accessory muscles; CTA b/l    CV: RRR, no JVD; no peripheral edema    GI: Soft, NT, ND, no rebound, no guarding; no palpable masses; no hepatosplenomegaly    LYMPH: No cervical LAD or tenderness; no axillary LAD or tenderness    MSK: Normal gait; No digital clubbing or cyanosis; examination of the head/neck without misalignment,            Normal ROM without pain, no spinal tenderness, normal muscle strength/tone    SKIN: No rashes or ulcers noted; no subcutaneous nodules or induration palpable    NEURO: CN II-XII intact; normal reflexes in upper, sensation intact in upper and lower extremities b/l to light touch     PSYCH: Appropriate insight/judgment; A+O x 3, mood and affect appropriate, recent/remote memory intact CONSTITUTIONAL: Well groomed, no apparent distress    EYES: PERRL and symmetric, EOMI, No conjunctival or scleral injection, non-icteric    ENMT:   Head: left chin laceration to mandible, laceration currently hemostatic  Eyes: EOMI, PERRL, visual acuity intact, no diplopia,  supra/infra orbital rims intact, no subconjunctival heme, no telecanthus, no exophthalmos  Ears: Gross hearing intact, No heme appreciated,   Nose: no crepitis/septal hematoma/asymmetry.  no Lacerations/abrasions/hematomas.  Malar: no malar depression, no CN V-2 paresthesia  Throat:  LAD, supple, FROM,     Extraoral/Intraoral Exam: TORO: 20 , Dentition grossly intact, no carious dentition, occlusion non-stable and non-reproducible, lingual anterior FOM hematoma present consistent with symphysis fracture, right sided mandibular step deformity from #27 distal, no CN V-3 paresthesia, no signs of infection, anterior edema and tenderness to palpation. FOM soft, NT. no mobility of maxilla/crepitis. TMJ: bilateral subcondylar fractures can be palpated    CN II-XII intact    RESP: No respiratory distress, no use of accessory muscles; CTA b/l    CV: RRR, no JVD; no peripheral edema    GI: Soft, NT, ND, no rebound, no guarding; no palpable masses; no hepatosplenomegaly    LYMPH: No cervical LAD or tenderness; no axillary LAD or tenderness    MSK: Normal gait; No digital clubbing or cyanosis; examination of the head/neck without misalignment,            Normal ROM without pain, no spinal tenderness, normal muscle strength/tone    SKIN: No rashes or ulcers noted; no subcutaneous nodules or induration palpable    NEURO: CN II-XII intact; normal reflexes in upper, sensation intact in upper and lower extremities b/l to light touch     PSYCH: Appropriate insight/judgment; A+O x 3, mood and affect appropriate, recent/remote memory intact

## 2023-02-02 NOTE — ED PROVIDER NOTE - CLINICAL SUMMARY MEDICAL DECISION MAKING FREE TEXT BOX
Chichi: Accidental fall ~16 hrs ago. Transferred here for OMFS for jaw fx and lac under chin. Will call OMFS. Chichi: Accidental fall ~16 hrs ago. Transferred here for OMFS for jaw fx and lac under chin. Will call OMFS.    Pt arrives with negative CT head and neck. transverse jaw fracture at the alveolar line and sagittal midline fracture. OMFS aware at time of pt arrival. severe pain s/p morphine. will add on fluids as pt will remain npo and dilaudid for pain. no historical features to suggest syncope/neurocardiac etiology of fall.

## 2023-02-02 NOTE — ED ADULT NURSE NOTE - CHIEF COMPLAINT QUOTE
transfer from [facility] for OMFS arrives with chin lac DSD in place had dose of oxy approx 5 hours ago at other facility

## 2023-02-02 NOTE — ED ADULT NURSE NOTE - OBJECTIVE STATEMENT
pt comes to ed s/p fall last night in bathroom, states she passed out, LAC to chin, c/o jaw pain pt comes to ed s/p fall last night in bathroom, states she passed out, LAC to chin, c/o jaw pain.

## 2023-02-02 NOTE — ED ADULT TRIAGE NOTE - CHIEF COMPLAINT QUOTE
" I fell going to the bathroom early am, I hit my chin on the tile abd cut it, my jaw hurts, I passed out after the fall, woke up in a pool of blood "

## 2023-02-02 NOTE — H&P ADULT - HISTORY OF PRESENT ILLNESS
51yo F transfer from [place] for [facility] care in the setting of known mandibular fracture status post fall overnight while walking to the bathroom.  Patient was in her usual state of health prior to this fall with no prodromal headache chest pain lightheadedness or shortness of breath.  Patient with jaw pain.  No headache neck pain back pain or joint pains.  Vision intact no nausea or vomiting

## 2023-02-02 NOTE — BH CONSULTATION LIAISON ASSESSMENT NOTE - NSBHATTESTCOMMENTATTENDFT_PSY_A_CORE
met with the patient today 2/3/2023. Discussed case with acp, impression and plan discussed and agreed upon.   Patient was anxious and wondering why she has not been resumed on her home dose of Valium. Keen on speaking to surgery team as well. No mood symptoms, denies any si or hi, no psychosis. Very anxious. Coordinated with  at bedside, who is keen on speaking to surgery team. According to him, patient is compliant with psychiatric treatment, and no evidence that she uses bzd more than what is prescribed.   Coordinated with rn

## 2023-02-02 NOTE — ED ADULT NURSE NOTE - OBJECTIVE STATEMENT
51 yo female, AOx4, received to room 10. Presents to ED as a transfer from [facility] with arrives with jaw fracture and laceration under chin after falling last night. Arrived with patent and intact 20g IV in RAC. Vitals documented.  Medicated as ordered. Pending Oral surgeon.

## 2023-02-02 NOTE — BH CONSULTATION LIAISON ASSESSMENT NOTE - OTHER PAST PSYCHIATRIC HISTORY (INCLUDE DETAILS REGARDING ONSET, COURSE OF ILLNESS, INPATIENT/OUTPATIENT TREATMENT)
patient with 1 admissions at [facility] in 2017  currently in outpatient treatment, has therapist

## 2023-02-02 NOTE — ED PROVIDER NOTE - OBJECTIVE STATEMENT
53yo F transfer from [place] 53yo F transfer from [place] for [facility] [place] care in the setting of known mandibular fracture status post fall overnight while walking to the bathroom.  Patient was in her usual state of health prior to this fall with no prodromal headache chest pain lightheadedness or shortness of breath.  Patient with jaw pain.  No headache neck pain back pain or joint pains.  Vision intact no nausea or vomiting

## 2023-02-02 NOTE — ED PROVIDER NOTE - OBJECTIVE STATEMENT
Patient is a 52-year-old female who presents to the emergency room with a chief complaint of jaw pain following fall past medical history of anxiety, depression, anorexia.  Patient reports that she is resting to the bathroom around 12:30 at night she believes she tripped lost her balance and fell striking her chin on the ground.  She reports she woke up several minutes later with severe jaw pain and a laceration noted to her lower jaw.  Did take Tylenol this morning without improvement in symptoms.  Denies any visual changes nausea vomiting chest pain shortness of breath abdominal pain extremity numbness or weakness.  Denies any neck or back pain.

## 2023-02-02 NOTE — H&P ADULT - NSICDXPASTMEDICALHX_GEN_ALL_CORE_FT
PAST MEDICAL HISTORY:  Anxiety     Closed Colles' fracture of left radius with malunion, subsequent encounter     Diverticulitis h/o    Endometriosis determined by laparoscopy     GERD (gastroesophageal reflux disease)     H/O alopecia     H/O heartburn     History of diverticulitis     Lymphadenopathy     Osteoporosis     Strabismus     
- - -

## 2023-02-02 NOTE — BH CONSULTATION LIAISON ASSESSMENT NOTE - NSBHCHARTREVIEWLAB_PSY_A_CORE FT
14.5   7.01  )-----------( 261      ( 02 Feb 2023 11:30 )             40.2   02-02    139  |  99  |  15  ----------------------------<  85  3.5   |  30  |  0.89    Ca    8.5      02 Feb 2023 11:30    TPro  7.3  /  Alb  4.3  /  TBili  0.4  /  DBili  x   /  AST  36  /  ALT  30  /  AlkPhos  52  02-02

## 2023-02-02 NOTE — ED PROVIDER NOTE - ATTENDING CONTRIBUTION TO CARE
I performed a face-to-face evaluation of the patient and performed a history and physical examination. I agree with the history and physical examination. If this was a PA visit, I personally saw the patient with the PA and performed a substantive portion of the visit including all aspects of the medical decision making.    Accidental fall ~16 hrs ago. Transferred here for OMFS for jaw fx and lac under chin. Will call OMFS.

## 2023-02-02 NOTE — ED ADULT TRIAGE NOTE - CHIEF COMPLAINT QUOTE
transfer from [facility] for OMFS arrives with chin lac DSD in place had dose of oxy approx 5 hours ago at other facility

## 2023-02-02 NOTE — BH CONSULTATION LIAISON ASSESSMENT NOTE - NSBHATTESTAPPAMEND_PSY_A_CORE
I have personally seen and examined this patient. I fully participated in the care of this patient. I have made amendments to the documentation where appropriate and otherwise agree with the history, physical exam, and plan as documented by the LUCY

## 2023-02-02 NOTE — ED PROVIDER NOTE - DIFFERENTIAL DIAGNOSIS
Differential Diagnosis Patient presenting to the emergency room chief complaint of jaw pain and facial laceration following a mechanical fall.  Concern for possible jaw fracture based on presentation.

## 2023-02-02 NOTE — BH CONSULTATION LIAISON ASSESSMENT NOTE - NSBHCHARTREVIEWVS_PSY_A_CORE FT
Vital Signs Last 24 Hrs  T(C): 36.3 (02 Feb 2023 21:20), Max: 36.8 (02 Feb 2023 17:23)  T(F): 97.4 (02 Feb 2023 21:20), Max: 98.2 (02 Feb 2023 17:23)  HR: 96 (02 Feb 2023 21:20) (69 - 96)  BP: 126/76 (02 Feb 2023 21:20) (100/67 - 126/76)  BP(mean): --  RR: 18 (02 Feb 2023 21:20) (18 - 18)  SpO2: 97% (02 Feb 2023 21:20) (97% - 100%)    Parameters below as of 02 Feb 2023 21:20  Patient On (Oxygen Delivery Method): room air

## 2023-02-02 NOTE — ED PROVIDER NOTE - PHYSICAL EXAMINATION
General: non-toxic, NAD  HEENT: 2cm laceration to inferior chin, PERRL, oropharyngeal AW patent. teeth without exposed dentin. minimal jaw opening on exam due to severe pain.   Cardiac: RRR, no murmurs, 2+ peripheral pulses  Resp: CTAB, normal rate  Abdomen: soft, non-distended, bowel sounds present, no ttp, no rebound or guarding. no organomegaly  Extremities: no deformity or ecchymosis.   Skin: warm and dry no rashes  Neuro: AAOx4, 5+motor, sensation grossly intact  Psych: mood and affect appropriate

## 2023-02-02 NOTE — BH CONSULTATION LIAISON ASSESSMENT NOTE - HPI (INCLUDE ILLNESS QUALITY, SEVERITY, DURATION, TIMING, CONTEXT, MODIFYING FACTORS, ASSOCIATED SIGNS AND SYMPTOMS)
Patient is a 53yo F transfer from [place] for [facility] [place] care in the setting of known mandibular fracture status post fall overnight while walking to the bathroom. Patient comes from home, lives with family. Patient has psychiatric hx of anorexia, substance addiction, anxiety and depression. Patient with 1 inpatient admission at [facility] in 2017 for eating disorder as well as substance (etoh) abuse. No hx of self harm, no hx of SA. Patient previously getting benzos illegally from the [facility] , now getting medications from psychiatrist and patient has 2 therapists as well. NO legal hx, no violence.     Spoke with  Jorge at bedside. As per , patient has been doing well at home. NO recent substance abuse - does still use ETOH socially. Patient in therapy for eating disorder. States she is addicted to valium, but it does help her anxiety. He confirms patient takes 30mg valium qam.   Istop Reference #: [id number] shows valium 10mg 4xaday prescribed by consistent provider.    Patient was seen and assessed at bedside. Patient is alert, oriented, irritable. Patient easily angered by questions and does not want provider in room. States she has not gotten her medication today and is very anxious without it. Patient denies any recent changes in mood at home, denies SI and HI, no over s/s of psychosis. interview is limited by pain in jaw as well as patient irritability.

## 2023-02-02 NOTE — BH CONSULTATION LIAISON ASSESSMENT NOTE - SUMMARY
Patient is a 53yo F transfer from [place] for [place] care in the setting of known mandibular fracture status post fall overnight while walking to the bathroom. Patient comes from home, lives with family. Patient has psychiatric hx of anorexia, substance addiction, anxiety and depression. Patient with 1 inpatient admission at [facility] in 2017 for eating disorder as well as substance (etoh) abuse. No hx of self harm, no hx of SA. Patient previously getting benzos illegally from the [facility] , now getting medications from psychiatrist and patient has 2 therapists as well. NO legal hx, no violence.     Spoke with  Jorge at bedside. As per , patient has been doing well at home. NO recent substance abuse - does still use ETOH socially. Patient in therapy for eating disorder. States she is addicted to valium, but it does help her anxiety. He confirms patient takes 30mg valium qam.   Istop Reference #: [id number] shows valium 10mg 4xaday prescribed by consistent provider.        PLAN  - EKG   - defer level of observation to primary team  - monitor PO intake    -*2/2: - please give valium 10mg STAT due to acute anxiety: -- patient did not take home valium today due to hospital visit    - please continue home medications: Prozac 40mg qhs, trazodone 100mg qhs ( hold for sedation)   - ambien 10mg qhs as ordered by primary team ( hold for sedation)   - istop checked - patient ordered for total 40mg daily of valium  - starting 2/3/23: patient and collateral confirm patient takes 30mg valium qam - continue home dosing during medical stay to prevent withdrawal and treat anxiety   -- (( if nursing policy differs can give 10mg TID - please check policy with nursing leadership on dayshift))  - PRN for anxiety: valium 5mg BID PRN

## 2023-02-02 NOTE — ED PROVIDER NOTE - NS ED ROS FT
Constitutional: no fevers, chills  HEENT: no HA, vision changes, rhinorrhea, sore throat  Cardiac: no chest pain, palpitations  Respiratory: no SOB, cough or hemoptysis  GI: no n/v/d/c, abd pain, bloody or dark stools  : no dysuria, frequency, or hematuria  MSK: no joint pain, neck pain or back pain  Skin: no rashes, jaundice, pruritis  Neuro: no numbness/tingling, weakness, unsteady gait  ROS otherwise negative except per HPI

## 2023-02-02 NOTE — BH CONSULTATION LIAISON ASSESSMENT NOTE - CURRENT MEDICATION
MEDICATIONS  (STANDING):  acetaminophen     Tablet .. 1000 milliGRAM(s) Oral every 6 hours  ampicillin/sulbactam  IVPB 3 Gram(s) IV Intermittent once  ampicillin/sulbactam  IVPB      chlorhexidine 0.12% Liquid 15 milliLiter(s) Oral Mucosa two times a day  dextrose 5% + lactated ringers. 1000 milliLiter(s) (60 mL/Hr) IV Continuous <Continuous>  diazepam    Tablet 20 milliGRAM(s) Oral daily  FLUoxetine 40 milliGRAM(s) Oral daily  ibuprofen  Tablet. 600 milliGRAM(s) Oral every 6 hours  melatonin 3 milliGRAM(s) Oral at bedtime  traZODone 100 milliGRAM(s) Oral at bedtime  zolpidem 12.5 milliGRAM(s) Oral at bedtime    MEDICATIONS  (PRN):  oxyCODONE    IR 5 milliGRAM(s) Oral every 4 hours PRN Moderate Pain (4 - 6)

## 2023-02-02 NOTE — ED PROVIDER NOTE - CLINICAL SUMMARY MEDICAL DECISION MAKING FREE TEXT BOX
Patient is a 52-year-old female who presents to the emergency room with a chief complaint of jaw pain following fall past medical history of anxiety, depression, anorexia.  Patient reports that she is resting to the bathroom around 12:30 at night she believes she tripped lost her balance and fell striking her chin on the ground.  She reports she woke up several minutes later with severe jaw pain and a laceration noted to her lower jaw.  Did take Tylenol this morning without improvement in symptoms.  Denies any visual changes nausea vomiting chest pain shortness of breath abdominal pain extremity numbness or weakness.  Denies any neck or back pain. Patient presenting to the emergency room chief complaint of jaw pain and facial laceration following a mechanical fall.  Concern for possible jaw fracture based on presentation.  Will obtain CT imaging of the head neck and facial bones will medicate for pain and monitor.  CT imaging noted no acute intracranial hemorrhage no cervical fracture there are multiple fractures of the mandible which appear to be nondisplaced including a full-thickness fracture in the mental region of the mandible.  Patient and family at bedside updated.  IV placed will obtain screening preop labs and begin antibiotics for open fracture.  Transfer center contacted for possible transfer for oral maxillofacial surgery.  Spoke with  will accept patient in transfer [facility].  Advised on laceration as if he would like this repaired prior to transfer declined

## 2023-02-02 NOTE — ED ADULT NURSE NOTE - CAS TRG GENERAL NORM CIRC DET
S/p fall at work yesterday was told she has a L knee fx and right knee injury that needs more imaging, sent by urgent care.
Strong peripheral pulses

## 2023-02-02 NOTE — H&P ADULT - NSHPLABSRESULTS_GEN_ALL_CORE
NTERPRETATION:  INDICATION:  Facial trauma. Jaw pain. Status post   trauma. Laceration.  TECHNIQUE:  Thin section axial and coronal CT imaging of the facial bones   was conducted.  In addition, coronal and sagittal reformations were   generated from the axial data.  3-D imaging was performed.    FINDINGS:    ORBITS:  No orbital fracture is depicted.  Both globes appear symmetric.    No intraconal or extraconal fluid collection or phlegmon is evident. The   extraocular muscles and optic nerves are of equal size bilaterally.    FACIAL BONES:   There are multiple fractures of the mandible. There is a   vertically oriented a fracturein the mental region of the mandible,   which is full-thickness that. There is no significant displacement, but   the fracture does angle posteriorly towards the left. Also noted are   nondisplaced fractures posteriorly involving the mandibular condyles. The   other facial bones appear to be intact that. There is no visible fracture   of the maxilla.    NASAL BONES:  no fracture is identified.    PARANASAL SINUSES:  clear    IMPRESSION:  Multiple fractures noted of the mandible, which appear to be   nondisplaced. This includes a full-thickness fracture in the mental   region of the mandible, in addition to nondisplaced fractures of both   proximal mandibular condyles. Both condylar heads remain within the TMJ.                          14.5   7.01  )-----------( 261      ( 02 Feb 2023 11:30 )             40.2     02-02    139  |  99  |  15  ----------------------------<  85  3.5   |  30  |  0.89    Ca    8.5      02 Feb 2023 11:30    TPro  7.3  /  Alb  4.3  /  TBili  0.4  /  DBili  x   /  AST  36  /  ALT  30  /  AlkPhos  52  02-02

## 2023-02-02 NOTE — BH CONSULTATION LIAISON ASSESSMENT NOTE - RISK ASSESSMENT
risk:  hx substance abuse, anxiety  Protective: no hx of SA, no SI, support from family, in treatment

## 2023-02-02 NOTE — H&P ADULT - ASSESSMENT
57 yr old female s/p mechanical fall sustaining mandibular parasymphysis fracture, anterior mandibular alveolar fracture, and bilateral subcondylar fractures. Patient's fractured indicated for operable repair. Patient will undergo ORIF of mandibular fractures in the OR.     Plan:  - admit to FS  - medicine consult, recs appreciated  - psych consult, recs appreciated  - nutrition consult  - ORIF of mandibular fractures tomorrow 2/3 in the OR  - chin laceration tacking sutures    Aiden Mcgill DDS  [facility] pager: [phone]   [place]: [phone]  Avaliable on teams

## 2023-02-03 DIAGNOSIS — F32.9 MAJOR DEPRESSIVE DISORDER, SINGLE EPISODE, UNSPECIFIED: ICD-10-CM

## 2023-02-03 DIAGNOSIS — R63.8 OTHER SYMPTOMS AND SIGNS CONCERNING FOOD AND FLUID INTAKE: ICD-10-CM

## 2023-02-03 DIAGNOSIS — S02.609A FRACTURE OF MANDIBLE, UNSPECIFIED, INITIAL ENCOUNTER FOR CLOSED FRACTURE: ICD-10-CM

## 2023-02-03 LAB
ANION GAP SERPL CALC-SCNC: 16 MMOL/L — HIGH (ref 7–14)
ANION GAP SERPL CALC-SCNC: 8 MMOL/L — SIGNIFICANT CHANGE UP (ref 7–14)
APTT BLD: 30.1 SEC — SIGNIFICANT CHANGE UP (ref 27–36.3)
BUN SERPL-MCNC: 5 MG/DL — LOW (ref 7–23)
BUN SERPL-MCNC: 7 MG/DL — SIGNIFICANT CHANGE UP (ref 7–23)
CALCIUM SERPL-MCNC: 7.9 MG/DL — LOW (ref 8.4–10.5)
CALCIUM SERPL-MCNC: 8.3 MG/DL — LOW (ref 8.4–10.5)
CHLORIDE SERPL-SCNC: 102 MMOL/L — SIGNIFICANT CHANGE UP (ref 98–107)
CHLORIDE SERPL-SCNC: 104 MMOL/L — SIGNIFICANT CHANGE UP (ref 98–107)
CO2 SERPL-SCNC: 22 MMOL/L — SIGNIFICANT CHANGE UP (ref 22–31)
CO2 SERPL-SCNC: 29 MMOL/L — SIGNIFICANT CHANGE UP (ref 22–31)
CREAT SERPL-MCNC: 0.57 MG/DL — SIGNIFICANT CHANGE UP (ref 0.5–1.3)
CREAT SERPL-MCNC: 0.66 MG/DL — SIGNIFICANT CHANGE UP (ref 0.5–1.3)
EGFR: 105 ML/MIN/1.73M2 — SIGNIFICANT CHANGE UP
EGFR: 109 ML/MIN/1.73M2 — SIGNIFICANT CHANGE UP
GLUCOSE SERPL-MCNC: 66 MG/DL — LOW (ref 70–99)
GLUCOSE SERPL-MCNC: 83 MG/DL — SIGNIFICANT CHANGE UP (ref 70–99)
HCG SERPL-ACNC: <5 MIU/ML — SIGNIFICANT CHANGE UP
HCT VFR BLD CALC: 37.4 % — SIGNIFICANT CHANGE UP (ref 34.5–45)
HGB BLD-MCNC: 12.6 G/DL — SIGNIFICANT CHANGE UP (ref 11.5–15.5)
INR BLD: 1.02 RATIO — SIGNIFICANT CHANGE UP (ref 0.88–1.16)
MAGNESIUM SERPL-MCNC: 1 MG/DL — CRITICAL LOW (ref 1.6–2.6)
MAGNESIUM SERPL-MCNC: 1 MG/DL — CRITICAL LOW (ref 1.6–2.6)
MCHC RBC-ENTMCNC: 31.1 PG — SIGNIFICANT CHANGE UP (ref 27–34)
MCHC RBC-ENTMCNC: 33.7 GM/DL — SIGNIFICANT CHANGE UP (ref 32–36)
MCV RBC AUTO: 92.3 FL — SIGNIFICANT CHANGE UP (ref 80–100)
NRBC # BLD: 0 /100 WBCS — SIGNIFICANT CHANGE UP (ref 0–0)
NRBC # FLD: 0 K/UL — SIGNIFICANT CHANGE UP (ref 0–0)
PHOSPHATE SERPL-MCNC: 2.3 MG/DL — LOW (ref 2.5–4.5)
PHOSPHATE SERPL-MCNC: 3 MG/DL — SIGNIFICANT CHANGE UP (ref 2.5–4.5)
PLATELET # BLD AUTO: 202 K/UL — SIGNIFICANT CHANGE UP (ref 150–400)
POTASSIUM SERPL-MCNC: 2.9 MMOL/L — CRITICAL LOW (ref 3.5–5.3)
POTASSIUM SERPL-MCNC: 4 MMOL/L — SIGNIFICANT CHANGE UP (ref 3.5–5.3)
POTASSIUM SERPL-SCNC: 2.9 MMOL/L — CRITICAL LOW (ref 3.5–5.3)
POTASSIUM SERPL-SCNC: 4 MMOL/L — SIGNIFICANT CHANGE UP (ref 3.5–5.3)
PROTHROM AB SERPL-ACNC: 11.8 SEC — SIGNIFICANT CHANGE UP (ref 10.5–13.4)
RBC # BLD: 4.05 M/UL — SIGNIFICANT CHANGE UP (ref 3.8–5.2)
RBC # FLD: 11.9 % — SIGNIFICANT CHANGE UP (ref 10.3–14.5)
SODIUM SERPL-SCNC: 140 MMOL/L — SIGNIFICANT CHANGE UP (ref 135–145)
SODIUM SERPL-SCNC: 141 MMOL/L — SIGNIFICANT CHANGE UP (ref 135–145)
WBC # BLD: 8.5 K/UL — SIGNIFICANT CHANGE UP (ref 3.8–10.5)
WBC # FLD AUTO: 8.5 K/UL — SIGNIFICANT CHANGE UP (ref 3.8–10.5)

## 2023-02-03 PROCEDURE — 99233 SBSQ HOSP IP/OBS HIGH 50: CPT

## 2023-02-03 PROCEDURE — 90792 PSYCH DIAG EVAL W/MED SRVCS: CPT

## 2023-02-03 PROCEDURE — 93010 ELECTROCARDIOGRAM REPORT: CPT

## 2023-02-03 RX ORDER — POTASSIUM CHLORIDE 20 MEQ
10 PACKET (EA) ORAL ONCE
Refills: 0 | Status: COMPLETED | OUTPATIENT
Start: 2023-02-03 | End: 2023-02-03

## 2023-02-03 RX ORDER — ACETAMINOPHEN 500 MG
800 TABLET ORAL ONCE
Refills: 0 | Status: DISCONTINUED | OUTPATIENT
Start: 2023-02-03 | End: 2023-02-03

## 2023-02-03 RX ORDER — ACETAMINOPHEN 500 MG
550 TABLET ORAL ONCE
Refills: 0 | Status: COMPLETED | OUTPATIENT
Start: 2023-02-04 | End: 2023-02-04

## 2023-02-03 RX ORDER — ACETAMINOPHEN 500 MG
800 TABLET ORAL EVERY 6 HOURS
Refills: 0 | Status: DISCONTINUED | OUTPATIENT
Start: 2023-02-03 | End: 2023-02-03

## 2023-02-03 RX ORDER — ACETAMINOPHEN 500 MG
550 TABLET ORAL ONCE
Refills: 0 | Status: COMPLETED | OUTPATIENT
Start: 2023-02-03 | End: 2023-02-03

## 2023-02-03 RX ORDER — HYDROMORPHONE HYDROCHLORIDE 2 MG/ML
0.5 INJECTION INTRAMUSCULAR; INTRAVENOUS; SUBCUTANEOUS EVERY 6 HOURS
Refills: 0 | Status: DISCONTINUED | OUTPATIENT
Start: 2023-02-03 | End: 2023-02-04

## 2023-02-03 RX ORDER — MAGNESIUM SULFATE 500 MG/ML
2 VIAL (ML) INJECTION ONCE
Refills: 0 | Status: COMPLETED | OUTPATIENT
Start: 2023-02-03 | End: 2023-02-03

## 2023-02-03 RX ORDER — POTASSIUM CHLORIDE 20 MEQ
10 PACKET (EA) ORAL
Refills: 0 | Status: COMPLETED | OUTPATIENT
Start: 2023-02-03 | End: 2023-02-03

## 2023-02-03 RX ORDER — SODIUM CHLORIDE 9 MG/ML
1000 INJECTION, SOLUTION INTRAVENOUS
Refills: 0 | Status: DISCONTINUED | OUTPATIENT
Start: 2023-02-03 | End: 2023-02-05

## 2023-02-03 RX ORDER — POTASSIUM CHLORIDE 20 MEQ
20 PACKET (EA) ORAL ONCE
Refills: 0 | Status: COMPLETED | OUTPATIENT
Start: 2023-02-03 | End: 2023-02-03

## 2023-02-03 RX ORDER — POTASSIUM CHLORIDE 20 MEQ
10 PACKET (EA) ORAL DAILY
Refills: 0 | Status: DISCONTINUED | OUTPATIENT
Start: 2023-02-03 | End: 2023-02-03

## 2023-02-03 RX ADMIN — CHLORHEXIDINE GLUCONATE 15 MILLILITER(S): 213 SOLUTION TOPICAL at 17:06

## 2023-02-03 RX ADMIN — AMPICILLIN SODIUM AND SULBACTAM SODIUM 200 GRAM(S): 250; 125 INJECTION, POWDER, FOR SUSPENSION INTRAMUSCULAR; INTRAVENOUS at 13:00

## 2023-02-03 RX ADMIN — Medication 100 MILLIGRAM(S): at 21:51

## 2023-02-03 RX ADMIN — OXYCODONE HYDROCHLORIDE 5 MILLIGRAM(S): 5 TABLET ORAL at 12:26

## 2023-02-03 RX ADMIN — Medication 20 MILLIEQUIVALENT(S): at 12:15

## 2023-02-03 RX ADMIN — OXYCODONE HYDROCHLORIDE 5 MILLIGRAM(S): 5 TABLET ORAL at 17:51

## 2023-02-03 RX ADMIN — HYDROMORPHONE HYDROCHLORIDE 0.5 MILLIGRAM(S): 2 INJECTION INTRAMUSCULAR; INTRAVENOUS; SUBCUTANEOUS at 14:49

## 2023-02-03 RX ADMIN — OXYCODONE HYDROCHLORIDE 5 MILLIGRAM(S): 5 TABLET ORAL at 17:21

## 2023-02-03 RX ADMIN — Medication 40 MILLIGRAM(S): at 14:12

## 2023-02-03 RX ADMIN — HYDROMORPHONE HYDROCHLORIDE 0.5 MILLIGRAM(S): 2 INJECTION INTRAMUSCULAR; INTRAVENOUS; SUBCUTANEOUS at 14:19

## 2023-02-03 RX ADMIN — Medication 20 MILLIGRAM(S): at 08:13

## 2023-02-03 RX ADMIN — AMPICILLIN SODIUM AND SULBACTAM SODIUM 200 GRAM(S): 250; 125 INJECTION, POWDER, FOR SUSPENSION INTRAMUSCULAR; INTRAVENOUS at 19:26

## 2023-02-03 RX ADMIN — HYDROMORPHONE HYDROCHLORIDE 0.5 MILLIGRAM(S): 2 INJECTION INTRAMUSCULAR; INTRAVENOUS; SUBCUTANEOUS at 01:52

## 2023-02-03 RX ADMIN — CHLORHEXIDINE GLUCONATE 15 MILLILITER(S): 213 SOLUTION TOPICAL at 07:16

## 2023-02-03 RX ADMIN — Medication 3 MILLIGRAM(S): at 21:50

## 2023-02-03 RX ADMIN — Medication 220 MILLIGRAM(S): at 08:00

## 2023-02-03 RX ADMIN — Medication 100 MILLIEQUIVALENT(S): at 10:03

## 2023-02-03 RX ADMIN — Medication 100 MILLIEQUIVALENT(S): at 12:16

## 2023-02-03 RX ADMIN — HYDROMORPHONE HYDROCHLORIDE 0.5 MILLIGRAM(S): 2 INJECTION INTRAMUSCULAR; INTRAVENOUS; SUBCUTANEOUS at 01:37

## 2023-02-03 RX ADMIN — ZOLPIDEM TARTRATE 10 MILLIGRAM(S): 10 TABLET ORAL at 21:51

## 2023-02-03 RX ADMIN — Medication 100 MILLIEQUIVALENT(S): at 10:58

## 2023-02-03 RX ADMIN — Medication 25 GRAM(S): at 17:06

## 2023-02-03 RX ADMIN — AMPICILLIN SODIUM AND SULBACTAM SODIUM 200 GRAM(S): 250; 125 INJECTION, POWDER, FOR SUSPENSION INTRAMUSCULAR; INTRAVENOUS at 04:04

## 2023-02-03 RX ADMIN — Medication 600 MILLIGRAM(S): at 07:14

## 2023-02-03 RX ADMIN — OXYCODONE HYDROCHLORIDE 5 MILLIGRAM(S): 5 TABLET ORAL at 12:56

## 2023-02-03 RX ADMIN — Medication 10 MILLIGRAM(S): at 14:12

## 2023-02-03 RX ADMIN — HYDROMORPHONE HYDROCHLORIDE 0.5 MILLIGRAM(S): 2 INJECTION INTRAMUSCULAR; INTRAVENOUS; SUBCUTANEOUS at 20:22

## 2023-02-03 RX ADMIN — SODIUM CHLORIDE 60 MILLILITER(S): 9 INJECTION, SOLUTION INTRAVENOUS at 07:17

## 2023-02-03 RX ADMIN — Medication 600 MILLIGRAM(S): at 07:40

## 2023-02-03 RX ADMIN — Medication 100 GRAM(S): at 15:34

## 2023-02-03 RX ADMIN — HYDROMORPHONE HYDROCHLORIDE 0.5 MILLIGRAM(S): 2 INJECTION INTRAMUSCULAR; INTRAVENOUS; SUBCUTANEOUS at 08:14

## 2023-02-03 RX ADMIN — HYDROMORPHONE HYDROCHLORIDE 0.5 MILLIGRAM(S): 2 INJECTION INTRAMUSCULAR; INTRAVENOUS; SUBCUTANEOUS at 20:52

## 2023-02-03 RX ADMIN — Medication 10 MILLIEQUIVALENT(S): at 12:59

## 2023-02-03 NOTE — PROVIDER CONTACT NOTE (CRITICAL VALUE NOTIFICATION) - BACKGROUND
52 F transfer from [place] for OMFS care in the setting of known mandibular fracture status post fall overnight while walking to the bathroom.  Patient with jaw pain.  No headache neck pain back pain or joint pains.

## 2023-02-03 NOTE — PROVIDER CONTACT NOTE (CRITICAL VALUE NOTIFICATION) - SITUATION
Lab value Mag 1.0 and potassium 2.9 are critical
Patient has critical lab value for Mag 1.0
Potassium 2.9  magnesium 1.0

## 2023-02-03 NOTE — PROVIDER CONTACT NOTE (CRITICAL VALUE NOTIFICATION) - BACKGROUND
51yo F transfer from [place] for [facility] [place] in the setting of known mandibular fracture status post fall overnight while walking to the bathroom.  Patient was in her usual state of health prior to this fall with no prodromal headache chest pain lightheadedness or shortness of breath.  Patient with jaw pain.  No headache neck pain back pain or joint pains.  Vision intact no nausea or vomiting 51yo F transfer from [place] for OMFS care in the setting of known mandibular fracture status post fall overnight while walking to the bathroom. Patient with jaw pain.  No headache neck pain back pain or joint pains.  Vision intact no nausea or vomiting.

## 2023-02-03 NOTE — CONSULT NOTE ADULT - PROBLEM SELECTOR RECOMMENDATION 9
-RCRI 0 for low risk procedure  -mets >4  -f/u EKG if this is normal (no pathologic Q waves, ST elevations, Qtc prolongation, and WV intervals wnl) patient is medically optimized for procedure  -fall likely polypharmacy, defer to psychiatric medications and later on possible valium taper per psychiatry. Patient SHOULD NOT be on long term valium as this increases the risks of falls. Though patient is extremely angry and frustrated, did not fully participate in history taking on interview. Explained risks of falls and syncope with high dose benzodiazapine, but patient became visibly upset and angry. -RCRI 0 for low risk procedure  -mets >4  -F/U EKG PATIENT REFUSED OVERNIGHT  -pt not optimized yet, want to assess pts Qtc given multiple psychiatric medications  -please reach out to day team once pt is amenable to ekg  -fall likely polypharmacy, defer to psychiatric medications and later on possible valium taper per psychiatry. Patient SHOULD NOT be on long term valium as this increases the risks of falls. Though patient is extremely angry and frustrated, did not fully participate in history taking on interview. Explained risks of falls and syncope with high dose benzodiazapine, but patient became visibly upset and angry. -RCRI 0 for low risk procedure  -mets >4  -F/U EKG if Qtc, MS interval are normal, and no pathologic Q waves or ST elevations patient is medically optimized. If abnormalities noted please reach out to day team.  -fall likely polypharmacy, defer to psychiatric medications and later on possible valium taper per psychiatry. Patient SHOULD NOT be on long term valium as this increases the risks of falls. Though patient is extremely angry and frustrated, did not fully participate in history taking on interview. Explained risks of falls and syncope with high dose benzodiazapine, but patient became visibly upset and angry.

## 2023-02-03 NOTE — CHART NOTE - NSCHARTNOTEFT_GEN_A_CORE
Internal Medicine Chart Note     EKG reviewed: Sinus tach and possible old septal infarct   EKG is unchanged from prior. No further cardiac w/up needed.   Supplement K and Mg, then repeat BMP.   May proceed w/ OR once electrolytes corrected.

## 2023-02-03 NOTE — CONSULT NOTE ADULT - ASSESSMENT
HPI: 52 year old F transfer from [facility] for mandibular fracture. She has a hx of anxiety, depression, anorexia, benzodiazapine abuse history.

## 2023-02-03 NOTE — PROGRESS NOTE ADULT - ASSESSMENT
57 yr old female s/p mechanical fall sustaining mandibular parasymphysis fracture, anterior mandibular alveolar fracture, and bilateral subcondylar fractures. Patient's fractured indicated for operable repair. Patient will undergo ORIF of mandibular fractures in the OR.     Plan:  - ORIF of mandible fracture today  - STRICT NPO  - EKG prior to procedure  - appreciate med recs  - appreciate psych recs  - nutrition consult    Aiden Mcgill DDS  [facility] pager: [phone]   [place]: [phone]  Avaliable on teams

## 2023-02-03 NOTE — CHART NOTE - NSCHARTNOTEFT_GEN_A_CORE
Patient refused EKG overnight  Will need to obtain this once able per primary team prior to medical optimization, want to assess Qtc and r/o any pathologic AV block or Q waves. Patient refused EKG overnight  Will need to obtain this if Qtc and LA intervals are wnl, and no pathologic Q waves and normal LA intervals, patient is medically optimized. If there are abnormalities in EKG please reach out to day team.

## 2023-02-03 NOTE — CONSULT NOTE ADULT - PROBLEM SELECTOR RECOMMENDATION 5
F-maintenance D5 1/2 NS  E-replete prn  N-NPO per OMFS  AC per OMFS F-maintenance D5 1/2 NS  E-replete prn  N-NPO per OMFS ONCE DIET STARTED NO TREE NUTS  AC per OMFS

## 2023-02-03 NOTE — CONSULT NOTE ADULT - SUBJECTIVE AND OBJECTIVE BOX
HPI: 52 year old F transfer from [facility] for mandibular fracture. She has a hx of anxiety, depression, anorexia, benzodiazapine abuse history. She fell while walking over to the bathroom, states she might have blacked out but is not completely sure. She denies chest pain, palpitations, nausea, vomiting, diarrhea, sob, headaches, visual changes, LE swelling diarrhea. She states drinking alcohol socially, but is addicted to valium. CT head noncontrast was negative for ICH, masses, or old strokes on imaging. Plan for mandibular fracture repair. Psychiatry was consulted due to patient's polypharmacy.       Past surgical history: no significant past surgical history    allergies: prednisone and tree nuts    family history: sudden cardiac arrests in family    ICU Vital Signs Last 24 Hrs  T(C): 36.9 (02 Feb 2023 23:00), Max: 36.9 (02 Feb 2023 23:00)  T(F): 98.4 (02 Feb 2023 23:00), Max: 98.4 (02 Feb 2023 23:00)  HR: 82 (02 Feb 2023 23:00) (69 - 96)  BP: 119/85 (02 Feb 2023 23:00) (100/67 - 126/76)  BP(mean): --  ABP: --  ABP(mean): --  RR: 18 (02 Feb 2023 23:00) (18 - 18)  SpO2: 100% (02 Feb 2023 23:00) (97% - 100%)    O2 Parameters below as of 02 Feb 2023 23:00  Patient On (Oxygen Delivery Method): room air    PHYSICAL EXAM:  GENERAL: NAD, well-developed  HEAD:  Atraumatic, Normocephalic  EYES: EOMI, PERRLA, conjunctiva and sclera clear  ENT: jaw pain on movement  NECK: Supple, No JVD  CHEST/LUNG: Clear to auscultation bilaterally; No wheeze  HEART: Regular rate and rhythm; No murmurs, rubs, or gallops  ABDOMEN: Soft, Nontender, Nondistended; Bowel sounds present  EXTREMITIES:  2+ Peripheral Pulses, No clubbing, cyanosis, or edema  PSYCH: AAOx3  NEUROLOGY: non-focal  SKIN: No rashes or lesions        MEDICATIONS  (STANDING):  acetaminophen     Tablet .. 1000 milliGRAM(s) Oral every 6 hours  ampicillin/sulbactam  IVPB 3 Gram(s) IV Intermittent every 6 hours  ampicillin/sulbactam  IVPB      chlorhexidine 0.12% Liquid 15 milliLiter(s) Oral Mucosa two times a day  dextrose 5% + lactated ringers. 1000 milliLiter(s) (60 mL/Hr) IV Continuous <Continuous>  diazepam    Tablet 20 milliGRAM(s) Oral <User Schedule>  diazepam    Tablet 10 milliGRAM(s) Oral <User Schedule>  FLUoxetine 40 milliGRAM(s) Oral daily  ibuprofen  Tablet. 600 milliGRAM(s) Oral every 6 hours  melatonin 3 milliGRAM(s) Oral at bedtime  traZODone 100 milliGRAM(s) Oral at bedtime  zolpidem 10 milliGRAM(s) Oral at bedtime    MEDICATIONS  (PRN):  HYDROmorphone  Injectable 0.5 milliGRAM(s) IV Push every 6 hours PRN Severe Pain (7 - 10)  oxyCODONE    IR 5 milliGRAM(s) Oral every 4 hours PRN Moderate Pain (4 - 6)    .  LABS:                         14.5   7.01  )-----------( 261      ( 02 Feb 2023 11:30 )             40.2     02-02    139  |  99  |  15  ----------------------------<  85  3.5   |  30  |  0.89    Ca    8.5      02 Feb 2023 11:30    TPro  7.3  /  Alb  4.3  /  TBili  0.4  /  DBili  x   /  AST  36  /  ALT  30  /  AlkPhos  52  02-02    PT/INR - ( 02 Feb 2023 11:30 )   PT: 13.4 sec;   INR: 1.13 ratio         PTT - ( 02 Feb 2023 11:30 )  PTT:29.4 sec              RADIOLOGY, EKG & ADDITIONAL TESTS: Reviewed.  HPI: 52 year old F transfer from [facility] for mandibular fracture. She has a hx of anxiety, depression, anorexia, benzodiazapine abuse history. She fell while walking over to the bathroom, states she might have blacked out but is not completely sure. She denies chest pain, palpitations, nausea, vomiting, diarrhea, sob, headaches, visual changes, LE swelling diarrhea. She states drinking alcohol socially, but is addicted to valium. CT head noncontrast was negative for ICH, masses, or old strokes on imaging. Plan for mandibular fracture repair. Psychiatry was consulted due to patient's polypharmacy.       Past surgical history: diverticular resection    allergies: prednisone and tree nuts    family history: sudden cardiac arrests in family    ICU Vital Signs Last 24 Hrs  T(C): 36.9 (02 Feb 2023 23:00), Max: 36.9 (02 Feb 2023 23:00)  T(F): 98.4 (02 Feb 2023 23:00), Max: 98.4 (02 Feb 2023 23:00)  HR: 82 (02 Feb 2023 23:00) (69 - 96)  BP: 119/85 (02 Feb 2023 23:00) (100/67 - 126/76)  BP(mean): --  ABP: --  ABP(mean): --  RR: 18 (02 Feb 2023 23:00) (18 - 18)  SpO2: 100% (02 Feb 2023 23:00) (97% - 100%)    O2 Parameters below as of 02 Feb 2023 23:00  Patient On (Oxygen Delivery Method): room air    PHYSICAL EXAM:  GENERAL: NAD, well-developed  HEAD:  Atraumatic, Normocephalic  EYES: EOMI, PERRLA, conjunctiva and sclera clear  ENT: jaw pain on movement  NECK: Supple, No JVD  CHEST/LUNG: Clear to auscultation bilaterally; No wheeze  HEART: Regular rate and rhythm; No murmurs, rubs, or gallops  ABDOMEN: Soft, Nontender, Nondistended; Bowel sounds present  EXTREMITIES:  2+ Peripheral Pulses, No clubbing, cyanosis, or edema  PSYCH: AAOx3  NEUROLOGY: non-focal  SKIN: No rashes or lesions        MEDICATIONS  (STANDING):  acetaminophen     Tablet .. 1000 milliGRAM(s) Oral every 6 hours  ampicillin/sulbactam  IVPB 3 Gram(s) IV Intermittent every 6 hours  ampicillin/sulbactam  IVPB      chlorhexidine 0.12% Liquid 15 milliLiter(s) Oral Mucosa two times a day  dextrose 5% + lactated ringers. 1000 milliLiter(s) (60 mL/Hr) IV Continuous <Continuous>  diazepam    Tablet 20 milliGRAM(s) Oral <User Schedule>  diazepam    Tablet 10 milliGRAM(s) Oral <User Schedule>  FLUoxetine 40 milliGRAM(s) Oral daily  ibuprofen  Tablet. 600 milliGRAM(s) Oral every 6 hours  melatonin 3 milliGRAM(s) Oral at bedtime  traZODone 100 milliGRAM(s) Oral at bedtime  zolpidem 10 milliGRAM(s) Oral at bedtime    MEDICATIONS  (PRN):  HYDROmorphone  Injectable 0.5 milliGRAM(s) IV Push every 6 hours PRN Severe Pain (7 - 10)  oxyCODONE    IR 5 milliGRAM(s) Oral every 4 hours PRN Moderate Pain (4 - 6)    .  LABS:                         14.5   7.01  )-----------( 261      ( 02 Feb 2023 11:30 )             40.2     02-02    139  |  99  |  15  ----------------------------<  85  3.5   |  30  |  0.89    Ca    8.5      02 Feb 2023 11:30    TPro  7.3  /  Alb  4.3  /  TBili  0.4  /  DBili  x   /  AST  36  /  ALT  30  /  AlkPhos  52  02-02    PT/INR - ( 02 Feb 2023 11:30 )   PT: 13.4 sec;   INR: 1.13 ratio         PTT - ( 02 Feb 2023 11:30 )  PTT:29.4 sec              RADIOLOGY, EKG & ADDITIONAL TESTS: Reviewed.

## 2023-02-03 NOTE — PROVIDER CONTACT NOTE (CRITICAL VALUE NOTIFICATION) - ASSESSMENT
Patient is very agitated, vitals stable,
no s/s of distress
A&Ox4, no acute distress noted, vitals are stable

## 2023-02-03 NOTE — PATIENT PROFILE ADULT - FALL HARM RISK - HARM RISK INTERVENTIONS

## 2023-02-04 LAB
AMPHET UR-MCNC: POSITIVE
ANION GAP SERPL CALC-SCNC: 9 MMOL/L — SIGNIFICANT CHANGE UP (ref 7–14)
APPEARANCE UR: CLEAR — SIGNIFICANT CHANGE UP
BARBITURATES UR SCN-MCNC: NEGATIVE — SIGNIFICANT CHANGE UP
BENZODIAZ UR-MCNC: POSITIVE
BILIRUB UR-MCNC: NEGATIVE — SIGNIFICANT CHANGE UP
BUN SERPL-MCNC: <2 MG/DL — LOW (ref 7–23)
CALCIUM SERPL-MCNC: 8.1 MG/DL — LOW (ref 8.4–10.5)
CHLORIDE SERPL-SCNC: 96 MMOL/L — LOW (ref 98–107)
CO2 SERPL-SCNC: 32 MMOL/L — HIGH (ref 22–31)
COCAINE METAB.OTHER UR-MCNC: NEGATIVE — SIGNIFICANT CHANGE UP
COLOR SPEC: COLORLESS — SIGNIFICANT CHANGE UP
CREAT SERPL-MCNC: 0.51 MG/DL — SIGNIFICANT CHANGE UP (ref 0.5–1.3)
CREATININE URINE RESULT, DAU: 52 MG/DL — SIGNIFICANT CHANGE UP
DIFF PNL FLD: NEGATIVE — SIGNIFICANT CHANGE UP
EGFR: 112 ML/MIN/1.73M2 — SIGNIFICANT CHANGE UP
GLUCOSE SERPL-MCNC: 91 MG/DL — SIGNIFICANT CHANGE UP (ref 70–99)
GLUCOSE UR QL: NEGATIVE — SIGNIFICANT CHANGE UP
HCT VFR BLD CALC: 34.8 % — SIGNIFICANT CHANGE UP (ref 34.5–45)
HGB BLD-MCNC: 11.8 G/DL — SIGNIFICANT CHANGE UP (ref 11.5–15.5)
KETONES UR-MCNC: NEGATIVE — SIGNIFICANT CHANGE UP
LEUKOCYTE ESTERASE UR-ACNC: NEGATIVE — SIGNIFICANT CHANGE UP
MAGNESIUM SERPL-MCNC: 1.5 MG/DL — LOW (ref 1.6–2.6)
MCHC RBC-ENTMCNC: 31 PG — SIGNIFICANT CHANGE UP (ref 27–34)
MCHC RBC-ENTMCNC: 33.9 GM/DL — SIGNIFICANT CHANGE UP (ref 32–36)
MCV RBC AUTO: 91.3 FL — SIGNIFICANT CHANGE UP (ref 80–100)
METHADONE UR-MCNC: NEGATIVE — SIGNIFICANT CHANGE UP
NITRITE UR-MCNC: NEGATIVE — SIGNIFICANT CHANGE UP
NRBC # BLD: 0 /100 WBCS — SIGNIFICANT CHANGE UP (ref 0–0)
NRBC # FLD: 0 K/UL — SIGNIFICANT CHANGE UP (ref 0–0)
OPIATES UR-MCNC: NEGATIVE — SIGNIFICANT CHANGE UP
OXYCODONE UR-MCNC: POSITIVE
PCP SPEC-MCNC: SIGNIFICANT CHANGE UP
PCP UR-MCNC: NEGATIVE — SIGNIFICANT CHANGE UP
PH UR: 6.5 — SIGNIFICANT CHANGE UP (ref 5–8)
PHOSPHATE SERPL-MCNC: 2.2 MG/DL — LOW (ref 2.5–4.5)
PLATELET # BLD AUTO: 189 K/UL — SIGNIFICANT CHANGE UP (ref 150–400)
POTASSIUM SERPL-MCNC: 3.1 MMOL/L — LOW (ref 3.5–5.3)
POTASSIUM SERPL-SCNC: 3.1 MMOL/L — LOW (ref 3.5–5.3)
PROT UR-MCNC: NEGATIVE — SIGNIFICANT CHANGE UP
RBC # BLD: 3.81 M/UL — SIGNIFICANT CHANGE UP (ref 3.8–5.2)
RBC # FLD: 11.7 % — SIGNIFICANT CHANGE UP (ref 10.3–14.5)
SODIUM SERPL-SCNC: 137 MMOL/L — SIGNIFICANT CHANGE UP (ref 135–145)
SP GR SPEC: 1.01 — LOW (ref 1.01–1.05)
THC UR QL: NEGATIVE — SIGNIFICANT CHANGE UP
UROBILINOGEN FLD QL: SIGNIFICANT CHANGE UP
WBC # BLD: 5.56 K/UL — SIGNIFICANT CHANGE UP (ref 3.8–10.5)
WBC # FLD AUTO: 5.56 K/UL — SIGNIFICANT CHANGE UP (ref 3.8–10.5)

## 2023-02-04 PROCEDURE — 99232 SBSQ HOSP IP/OBS MODERATE 35: CPT

## 2023-02-04 PROCEDURE — 71045 X-RAY EXAM CHEST 1 VIEW: CPT | Mod: 26

## 2023-02-04 RX ORDER — ACETAMINOPHEN 500 MG
650 TABLET ORAL EVERY 6 HOURS
Refills: 0 | Status: DISCONTINUED | OUTPATIENT
Start: 2023-02-04 | End: 2023-02-05

## 2023-02-04 RX ORDER — KETOROLAC TROMETHAMINE 30 MG/ML
15 SYRINGE (ML) INJECTION ONCE
Refills: 0 | Status: DISCONTINUED | OUTPATIENT
Start: 2023-02-04 | End: 2023-02-04

## 2023-02-04 RX ORDER — HYDROMORPHONE HYDROCHLORIDE 2 MG/ML
0.5 INJECTION INTRAMUSCULAR; INTRAVENOUS; SUBCUTANEOUS EVERY 6 HOURS
Refills: 0 | Status: DISCONTINUED | OUTPATIENT
Start: 2023-02-04 | End: 2023-02-04

## 2023-02-04 RX ORDER — ACETAMINOPHEN 500 MG
650 TABLET ORAL ONCE
Refills: 0 | Status: COMPLETED | OUTPATIENT
Start: 2023-02-04 | End: 2023-02-04

## 2023-02-04 RX ORDER — OXYCODONE HYDROCHLORIDE 5 MG/1
5 TABLET ORAL EVERY 4 HOURS
Refills: 0 | Status: DISCONTINUED | OUTPATIENT
Start: 2023-02-04 | End: 2023-02-07

## 2023-02-04 RX ORDER — HYDROMORPHONE HYDROCHLORIDE 2 MG/ML
0.5 INJECTION INTRAMUSCULAR; INTRAVENOUS; SUBCUTANEOUS EVERY 4 HOURS
Refills: 0 | Status: DISCONTINUED | OUTPATIENT
Start: 2023-02-04 | End: 2023-02-08

## 2023-02-04 RX ORDER — MAGNESIUM SULFATE 500 MG/ML
2 VIAL (ML) INJECTION ONCE
Refills: 0 | Status: COMPLETED | OUTPATIENT
Start: 2023-02-04 | End: 2023-02-04

## 2023-02-04 RX ORDER — OXYCODONE HYDROCHLORIDE 5 MG/1
7.5 TABLET ORAL EVERY 4 HOURS
Refills: 0 | Status: DISCONTINUED | OUTPATIENT
Start: 2023-02-04 | End: 2023-02-07

## 2023-02-04 RX ORDER — POTASSIUM PHOSPHATE, MONOBASIC POTASSIUM PHOSPHATE, DIBASIC 236; 224 MG/ML; MG/ML
30 INJECTION, SOLUTION INTRAVENOUS ONCE
Refills: 0 | Status: COMPLETED | OUTPATIENT
Start: 2023-02-04 | End: 2023-02-04

## 2023-02-04 RX ADMIN — HYDROMORPHONE HYDROCHLORIDE 0.5 MILLIGRAM(S): 2 INJECTION INTRAMUSCULAR; INTRAVENOUS; SUBCUTANEOUS at 03:58

## 2023-02-04 RX ADMIN — OXYCODONE HYDROCHLORIDE 5 MILLIGRAM(S): 5 TABLET ORAL at 00:38

## 2023-02-04 RX ADMIN — Medication 20 MILLIGRAM(S): at 06:48

## 2023-02-04 RX ADMIN — CHLORHEXIDINE GLUCONATE 15 MILLILITER(S): 213 SOLUTION TOPICAL at 17:09

## 2023-02-04 RX ADMIN — Medication 10 MILLIGRAM(S): at 14:14

## 2023-02-04 RX ADMIN — HYDROMORPHONE HYDROCHLORIDE 0.5 MILLIGRAM(S): 2 INJECTION INTRAMUSCULAR; INTRAVENOUS; SUBCUTANEOUS at 08:42

## 2023-02-04 RX ADMIN — Medication 85 MILLIMOLE(S): at 07:07

## 2023-02-04 RX ADMIN — HYDROMORPHONE HYDROCHLORIDE 0.5 MILLIGRAM(S): 2 INJECTION INTRAMUSCULAR; INTRAVENOUS; SUBCUTANEOUS at 21:51

## 2023-02-04 RX ADMIN — HYDROMORPHONE HYDROCHLORIDE 0.5 MILLIGRAM(S): 2 INJECTION INTRAMUSCULAR; INTRAVENOUS; SUBCUTANEOUS at 15:50

## 2023-02-04 RX ADMIN — AMPICILLIN SODIUM AND SULBACTAM SODIUM 200 GRAM(S): 250; 125 INJECTION, POWDER, FOR SUSPENSION INTRAMUSCULAR; INTRAVENOUS at 06:48

## 2023-02-04 RX ADMIN — Medication 600 MILLIGRAM(S): at 17:10

## 2023-02-04 RX ADMIN — AMPICILLIN SODIUM AND SULBACTAM SODIUM 200 GRAM(S): 250; 125 INJECTION, POWDER, FOR SUSPENSION INTRAMUSCULAR; INTRAVENOUS at 18:23

## 2023-02-04 RX ADMIN — OXYCODONE HYDROCHLORIDE 5 MILLIGRAM(S): 5 TABLET ORAL at 11:16

## 2023-02-04 RX ADMIN — Medication 260 MILLIGRAM(S): at 11:52

## 2023-02-04 RX ADMIN — OXYCODONE HYDROCHLORIDE 5 MILLIGRAM(S): 5 TABLET ORAL at 12:16

## 2023-02-04 RX ADMIN — OXYCODONE HYDROCHLORIDE 5 MILLIGRAM(S): 5 TABLET ORAL at 01:08

## 2023-02-04 RX ADMIN — CHLORHEXIDINE GLUCONATE 15 MILLILITER(S): 213 SOLUTION TOPICAL at 06:48

## 2023-02-04 RX ADMIN — Medication 25 GRAM(S): at 17:09

## 2023-02-04 RX ADMIN — Medication 40 MILLIGRAM(S): at 13:09

## 2023-02-04 RX ADMIN — Medication 600 MILLIGRAM(S): at 18:10

## 2023-02-04 RX ADMIN — HYDROMORPHONE HYDROCHLORIDE 0.5 MILLIGRAM(S): 2 INJECTION INTRAMUSCULAR; INTRAVENOUS; SUBCUTANEOUS at 16:05

## 2023-02-04 RX ADMIN — POTASSIUM PHOSPHATE, MONOBASIC POTASSIUM PHOSPHATE, DIBASIC 83.33 MILLIMOLE(S): 236; 224 INJECTION, SOLUTION INTRAVENOUS at 17:31

## 2023-02-04 RX ADMIN — Medication 15 MILLIGRAM(S): at 13:08

## 2023-02-04 RX ADMIN — ZOLPIDEM TARTRATE 10 MILLIGRAM(S): 10 TABLET ORAL at 21:46

## 2023-02-04 RX ADMIN — HYDROMORPHONE HYDROCHLORIDE 0.5 MILLIGRAM(S): 2 INJECTION INTRAMUSCULAR; INTRAVENOUS; SUBCUTANEOUS at 22:19

## 2023-02-04 RX ADMIN — AMPICILLIN SODIUM AND SULBACTAM SODIUM 200 GRAM(S): 250; 125 INJECTION, POWDER, FOR SUSPENSION INTRAMUSCULAR; INTRAVENOUS at 13:08

## 2023-02-04 RX ADMIN — Medication 3 MILLIGRAM(S): at 21:45

## 2023-02-04 RX ADMIN — HYDROMORPHONE HYDROCHLORIDE 0.5 MILLIGRAM(S): 2 INJECTION INTRAMUSCULAR; INTRAVENOUS; SUBCUTANEOUS at 03:28

## 2023-02-04 RX ADMIN — Medication 15 MILLIGRAM(S): at 13:23

## 2023-02-04 RX ADMIN — Medication 220 MILLIGRAM(S): at 01:52

## 2023-02-04 RX ADMIN — Medication 100 MILLIGRAM(S): at 21:45

## 2023-02-04 RX ADMIN — Medication 650 MILLIGRAM(S): at 12:07

## 2023-02-04 RX ADMIN — AMPICILLIN SODIUM AND SULBACTAM SODIUM 200 GRAM(S): 250; 125 INJECTION, POWDER, FOR SUSPENSION INTRAMUSCULAR; INTRAVENOUS at 00:40

## 2023-02-04 RX ADMIN — Medication 600 MILLIGRAM(S): at 11:16

## 2023-02-04 RX ADMIN — Medication 550 MILLIGRAM(S): at 02:22

## 2023-02-04 RX ADMIN — HYDROMORPHONE HYDROCHLORIDE 0.5 MILLIGRAM(S): 2 INJECTION INTRAMUSCULAR; INTRAVENOUS; SUBCUTANEOUS at 08:27

## 2023-02-04 RX ADMIN — Medication 600 MILLIGRAM(S): at 12:16

## 2023-02-04 NOTE — CONSULT NOTE ADULT - SUBJECTIVE AND OBJECTIVE BOX
Chief Complaint:  unrelieved jaw pain    HPI:   51yo F transfer from [place] for [facility] care in the setting of known mandibular fracture status post fall overnight while walking to the bathroom.  Patient was in her usual state of health prior to this fall with no prodromal headache chest pain lightheadedness or shortness of breath. Patient presents with jaw pain.  No headache neck pain back pain or joint pains.  Vision intact no nausea or vomiting (2023 21:05)    Patient's sister at the bedside and walked away from her sister to verbalize that the patient has had many psychiatric issues from anorexia to anxiety. The patient will always complain about pain and want more pain medications.  Explained to patient's sister we will recommend safe dosages and frequencies, while also recommending continuous pulse oximetry.  The patient was seen at the bedside, sitting up in bed drinking water through a straw.  Patient is able to sit up and down in bed without grimacing.  The patient states that she is currently in 7/10 pain, the worse being 10/10 since she fell in the bathroom.  The patient describes the pain as being constant and sharp, accompanied by a, "nerve pain" from chipping her teeth.  At home, the patient is taking Valium 30 mg in the morning once a day.  Before, she was on 40mg, but her doctor is slowly weaning her off.  Patient denies any smoking, recent alcohol or illicit drug use.  In the hospital, patient states that the Oxycodone is not enough, she needs more.  The IV Dilaudid 0.5mg helps but pain relief only last 2 hours.       PAST MEDICAL & SURGICAL HISTORY:  Anxiety  Endometriosis determined by laparoscopy  Strabismus  GERD (gastroesophageal reflux disease)  Diverticulitis  h/o Osteoporosis  Lymphadenopathy  H/O alopecia  H/O heartburn  History of diverticulitis  Closed Colles&#x27; fracture of left radius with malunion, subsequent encounter  Endometriosis determined by laparoscopy  over 15 years ago  History of strabismus surgery  2S/P colon resection  2017  S/P appendectomy  2017    FAMILY HISTORY:  Family history of heart disease  father  83      SOCIAL HISTORY:  [x ] Denies Smoking, Alcohol, or Drug Use    Allergies  predniSONE (Unknown)  Tree Nuts (Hives; Other)    PAIN MEDICATIONS:  diazepam    Tablet 20 milliGRAM(s) Oral <User Schedule>  diazepam    Tablet 10 milliGRAM(s) Oral <User Schedule>  FLUoxetine 40 milliGRAM(s) Oral daily  HYDROmorphone  Injectable 0.5 milliGRAM(s) IV Push every 6 hours PRN  HYDROmorphone  Injectable 0.5 milliGRAM(s) IV Push every 6 hours PRN  ibuprofen  Tablet. 600 milliGRAM(s) Oral every 6 hours  melatonin 3 milliGRAM(s) Oral at bedtime  oxyCODONE    IR 5 milliGRAM(s) Oral every 4 hours PRN  traZODone 100 milliGRAM(s) Oral at bedtime  zolpidem 10 milliGRAM(s) Oral at bedtime      Antibiotics:  ampicillin/sulbactam  IVPB      ampicillin/sulbactam  IVPB 3 Gram(s) IV Intermittent every 6 hours    All Other Medications:  chlorhexidine 0.12% Liquid 15 milliLiter(s) Oral Mucosa two times a day  dextrose 5% + sodium chloride 0.45%. 1000 milliLiter(s) IV Continuous <Continuous>      Vital Signs Last 24 Hrs  T(C): 37.1 (2023 12:15), Max: 39.2 (2023 02:10)  T(F): 98.7 (2023 12:15), Max: 102.5 (2023 02:10)  HR: 107 (2023 10:22) (79 - 107)  BP: 107/76 (2023 10:22) (106/73 - 121/81)  BP(mean): 85 (2023 08:35) (85 - 85)  RR: 18 (2023 10:22) (18 - 20)  SpO2: 97% (2023 10:22) (97% - 99%)    Parameters below as of 2023 10:22  Patient On (Oxygen Delivery Method): room air      PAIN SCORE:  7/10       SCALE USED: (1-10 VNRS)           LABS:                          11.8   5.56  )-----------( 189      ( 2023 07:49 )             34.8     02-04    137  |  96<L>  |  <2<L>  ----------------------------<  91  3.1<L>   |  32<H>  |  0.51    Ca    8.1<L>      2023 07:49  Phos  2.2     02-04  Mg     1.50     02-04      PT/INR - ( 2023 05:50 )   PT: 11.8 sec;   INR: 1.02 ratio         PTT - ( 2023 05:50 )  PTT:30.1 sec  Urinalysis Basic - ( 2023 03:38 )    Color: Colorless / Appearance: Clear / S.008 / pH: x  Gluc: x / Ketone: Negative  / Bili: Negative / Urobili: <2 mg/dL   Blood: x / Protein: Negative / Nitrite: Negative   Leuk Esterase: Negative / RBC: x / WBC x   Sq Epi: x / Non Sq Epi: x / Bacteria: x    Drug Screen:  Drug Screen W/PCP, Urine: Done ( @ 13:30)    [x ]  [facility]  Reviewed reference # [id number]  last dispensed 23 Valium 10mg, quantity 120, 30 day supply, Zolpidem ER 12.5 mg tablet, 30 quantity, 30 day supply  prescriber name Dr. Casper Rome      PHYSICAL EXAM:  GENERAL: Alert & Oriented x 3 in NAD, well-groomed, well-developed, gauze to lower chin.  Patient is able to sit up in bed, without issue. Sister is at the bedside.      Impression/Plan: Requested by [facility] to help manage pain.   Recommendations:  While patient is able to tolerate clears:  -  Consider discontinuing current Oxycodone and Dilaudid orders, instead order:  (Oxycodone can be a pill or po solution whatever patient prefers.)    Oxycodone 5 mg every 4 hours PRN for moderate pain.  Hold for oversedation. Not to be given within 1 hour of any other immediate acting opioid.  Only one dose of Oxycodone in any 4 hours.   Oxycodone 7.5 mg every 4 hours PRN for severe pain. Hold for oversedation. Not to be given within 1 hour of any other immediate acting opioid. Only one dose of Oxycodone in any 4 hours.   IV Dilaudid 0.5mg every 4 hours PRN for severe breakthrough pain. Hold for oversedation. Not to be given within 1 hour of any other immediate acting opioid.  Must give po Oxycodone first. To be given only if pain unrelieved by Oxycodone.  -  Consider ordering Acetaminophen 650 mg every 6 hours standing x 2 days, then PRN for pain.      -  Patient refused Gabapentin.    IF patient is NPO after midnight for OR on 23:  May consider:  IV Dilaudid 0.5mg every 4 hours PRN for moderate to severe pain. Hold for oversedation. Not to be given within 1 hour of any other immediate acting opioid.    IV Dilaudid 0.5mg every 4 hours PRN for severe breakthrough pain. Hold for oversedation. Not to be given within 1 hour of any other immediate acting opioid.      -  Recommend maintaining continuous pulse oximetry.  -  Recommend follow up with Psychiatry for possible SNRI for nerve pain like Cymbalta given patient refused Gabapentin.  -  Recommend follow up with [facility]  -  Recommend follow up with Pain Management  doctor when discharged. If patient does not have a Pain doctor, may acquire one through patient's personal insurance carrier.  Discussed patient with Anesthesia Pain Attending, Dr. Geronimo, who agrees with the above recommendations.   No further recommendations at this time, pain service to sign off. May call Acute Pain Service if needed.   Thank you.

## 2023-02-04 NOTE — DIETITIAN INITIAL EVALUATION ADULT - PERTINENT LABORATORY DATA
02-04    137  |  96<L>  |  <2<L>  ----------------------------<  91  3.1<L>   |  32<H>  |  0.51    Ca    8.1<L>      04 Feb 2023 07:49  Phos  2.2     02-04  Mg     1.50     02-04

## 2023-02-04 NOTE — DIETITIAN INITIAL EVALUATION ADULT - ORAL NUTRITION SUPPLEMENTS
Recommend add Ensure Plus (350 kcal, 20 gm protein per 8 oz serving) x 3 per day for added calories and protein.

## 2023-02-04 NOTE — DIETITIAN INITIAL EVALUATION ADULT - PERTINENT MEDS FT
MEDICATIONS  (STANDING):  ampicillin/sulbactam  IVPB      ampicillin/sulbactam  IVPB 3 Gram(s) IV Intermittent every 6 hours  chlorhexidine 0.12% Liquid 15 milliLiter(s) Oral Mucosa two times a day  dextrose 5% + sodium chloride 0.45%. 1000 milliLiter(s) (60 mL/Hr) IV Continuous <Continuous>  diazepam    Tablet 20 milliGRAM(s) Oral <User Schedule>  diazepam    Tablet 10 milliGRAM(s) Oral <User Schedule>  FLUoxetine 40 milliGRAM(s) Oral daily  ibuprofen  Tablet. 600 milliGRAM(s) Oral every 6 hours  melatonin 3 milliGRAM(s) Oral at bedtime  traZODone 100 milliGRAM(s) Oral at bedtime  zolpidem 10 milliGRAM(s) Oral at bedtime    MEDICATIONS  (PRN):  HYDROmorphone  Injectable 0.5 milliGRAM(s) IV Push every 6 hours PRN Severe Pain (7 - 10)  HYDROmorphone  Injectable 0.5 milliGRAM(s) IV Push every 6 hours PRN Severe Pain (7 - 10)  oxyCODONE    IR 5 milliGRAM(s) Oral every 4 hours PRN Moderate Pain (4 - 6)

## 2023-02-04 NOTE — PROGRESS NOTE ADULT - ASSESSMENT
57 yr old female s/p mechanical fall sustaining mandibular parasymphysis fracture, anterior mandibular alveolar fracture, and bilateral subcondylar fractures. Patient's fractured indicated for operable repair. Patient will undergo ORIF of mandibular fractures in the OR.     Plan:  - ORIF of mandible fracture Monday  - FLD then NPO after midnight on Sunday   - appreciate med recs  - appreciate psych recs  - nutrition consult    Nany Benitez DDS  [facility] pager: [phone]   [place]: [phone]  Avaliable on teams

## 2023-02-04 NOTE — DIETITIAN INITIAL EVALUATION ADULT - NS FNS DIET ORDER
Diet, Full Liquid:   Supplement Feeding Modality:  Oral  Ensure Max Cans or Servings Per Day:  5       Frequency:  Three Times a day (02-04-23 @ 09:02)

## 2023-02-04 NOTE — DIETITIAN INITIAL EVALUATION ADULT - PERSON TAUGHT/METHOD
Discussed having small frequent po intake as tolerated. Importance of having PO supplement as added calories and protein rather than only meal replacement at this time./verbal instruction/patient instructed

## 2023-02-04 NOTE — PROGRESS NOTE ADULT - ASSESSMENT
HPI: 52 year old F transfer from [facility] for mandibular fracture. She has a hx of anxiety, depression, anorexia, benzodiazapine abuse history.

## 2023-02-04 NOTE — DIETITIAN INITIAL EVALUATION ADULT - OTHER INFO
Per chart review, 57 yr old female s/p mechanical fall (likely 2/2 polypharmacy) sustaining mandibular parasymphysis fracture, anterior mandibular alveolar fracture, and bilateral subcondylar fractures. Patient's fractured indicated for operable repair. Patient will undergo ORIF of mandibular fractures in the OR. Per [facility] note on 2/2/23- patient w/ psychiatric hx of anorexia, substance addiction, anxiety and depression. Also, with 1 inpatient admission at [facility] in 2017 for eating disorder as well as substance (etoh) abuse. She is in therapy for eating disorder.    Patient was advanced to Full liquids on 2/3 and tolerating well. She requested to be started on Ensure nutritional supplement which she has taken PTA as a meal replacement. Patient is ordered for Ensure Max (150kcal and 30g pro) and would benefit from higher calorie supplement Ensure Plus (350 kcal, 20 gm protein per 8 oz serving) x 3 per day and RD informed OMFS team to change to this oral supplement. Patient denies any nausea/vomiting/diarrhea/constipation. +Difficulty chewing and swallowing due to mandibular fracture but tolerating liquids well. She provided minimal participation during interview questions. Did not elaborate when inquired further about diet or weight related history. Unreliable fluctuating weight history per Stu KONX.

## 2023-02-05 DIAGNOSIS — R50.9 FEVER, UNSPECIFIED: ICD-10-CM

## 2023-02-05 LAB
ANION GAP SERPL CALC-SCNC: 6 MMOL/L — LOW (ref 7–14)
BUN SERPL-MCNC: 2 MG/DL — LOW (ref 7–23)
CALCIUM SERPL-MCNC: 8.1 MG/DL — LOW (ref 8.4–10.5)
CHLORIDE SERPL-SCNC: 99 MMOL/L — SIGNIFICANT CHANGE UP (ref 98–107)
CO2 SERPL-SCNC: 33 MMOL/L — HIGH (ref 22–31)
CREAT SERPL-MCNC: 0.53 MG/DL — SIGNIFICANT CHANGE UP (ref 0.5–1.3)
EGFR: 111 ML/MIN/1.73M2 — SIGNIFICANT CHANGE UP
GLUCOSE SERPL-MCNC: 101 MG/DL — HIGH (ref 70–99)
HCT VFR BLD CALC: 32.4 % — LOW (ref 34.5–45)
HGB BLD-MCNC: 11.4 G/DL — LOW (ref 11.5–15.5)
MAGNESIUM SERPL-MCNC: 1.4 MG/DL — LOW (ref 1.6–2.6)
MCHC RBC-ENTMCNC: 31.8 PG — SIGNIFICANT CHANGE UP (ref 27–34)
MCHC RBC-ENTMCNC: 35.2 GM/DL — SIGNIFICANT CHANGE UP (ref 32–36)
MCV RBC AUTO: 90.5 FL — SIGNIFICANT CHANGE UP (ref 80–100)
NRBC # BLD: 0 /100 WBCS — SIGNIFICANT CHANGE UP (ref 0–0)
NRBC # FLD: 0 K/UL — SIGNIFICANT CHANGE UP (ref 0–0)
PHOSPHATE SERPL-MCNC: 3 MG/DL — SIGNIFICANT CHANGE UP (ref 2.5–4.5)
PLATELET # BLD AUTO: 146 K/UL — LOW (ref 150–400)
POTASSIUM SERPL-MCNC: 3.5 MMOL/L — SIGNIFICANT CHANGE UP (ref 3.5–5.3)
POTASSIUM SERPL-SCNC: 3.5 MMOL/L — SIGNIFICANT CHANGE UP (ref 3.5–5.3)
RBC # BLD: 3.58 M/UL — LOW (ref 3.8–5.2)
RBC # FLD: 11.9 % — SIGNIFICANT CHANGE UP (ref 10.3–14.5)
SARS-COV-2 RNA SPEC QL NAA+PROBE: SIGNIFICANT CHANGE UP
SODIUM SERPL-SCNC: 138 MMOL/L — SIGNIFICANT CHANGE UP (ref 135–145)
WBC # BLD: 5.29 K/UL — SIGNIFICANT CHANGE UP (ref 3.8–10.5)
WBC # FLD AUTO: 5.29 K/UL — SIGNIFICANT CHANGE UP (ref 3.8–10.5)

## 2023-02-05 PROCEDURE — 99232 SBSQ HOSP IP/OBS MODERATE 35: CPT

## 2023-02-05 RX ORDER — KETOROLAC TROMETHAMINE 30 MG/ML
15 SYRINGE (ML) INJECTION EVERY 4 HOURS
Refills: 0 | Status: DISCONTINUED | OUTPATIENT
Start: 2023-02-05 | End: 2023-02-05

## 2023-02-05 RX ORDER — DIAZEPAM 5 MG
5 TABLET ORAL
Refills: 0 | Status: DISCONTINUED | OUTPATIENT
Start: 2023-02-05 | End: 2023-02-07

## 2023-02-05 RX ORDER — ACETAMINOPHEN 500 MG
725 TABLET ORAL ONCE
Refills: 0 | Status: COMPLETED | OUTPATIENT
Start: 2023-02-05 | End: 2023-02-05

## 2023-02-05 RX ORDER — MAGNESIUM SULFATE 500 MG/ML
2 VIAL (ML) INJECTION ONCE
Refills: 0 | Status: COMPLETED | OUTPATIENT
Start: 2023-02-05 | End: 2023-02-05

## 2023-02-05 RX ORDER — KETOROLAC TROMETHAMINE 30 MG/ML
30 SYRINGE (ML) INJECTION EVERY 6 HOURS
Refills: 0 | Status: DISCONTINUED | OUTPATIENT
Start: 2023-02-05 | End: 2023-02-05

## 2023-02-05 RX ORDER — KETOROLAC TROMETHAMINE 30 MG/ML
15 SYRINGE (ML) INJECTION EVERY 6 HOURS
Refills: 0 | Status: DISCONTINUED | OUTPATIENT
Start: 2023-02-05 | End: 2023-02-07

## 2023-02-05 RX ORDER — ACETAMINOPHEN 500 MG
725 TABLET ORAL ONCE
Refills: 0 | Status: COMPLETED | OUTPATIENT
Start: 2023-02-06 | End: 2023-02-06

## 2023-02-05 RX ORDER — SODIUM CHLORIDE 9 MG/ML
1000 INJECTION, SOLUTION INTRAVENOUS
Refills: 0 | Status: DISCONTINUED | OUTPATIENT
Start: 2023-02-05 | End: 2023-02-06

## 2023-02-05 RX ADMIN — AMPICILLIN SODIUM AND SULBACTAM SODIUM 200 GRAM(S): 250; 125 INJECTION, POWDER, FOR SUSPENSION INTRAMUSCULAR; INTRAVENOUS at 06:02

## 2023-02-05 RX ADMIN — Medication 15 MILLIGRAM(S): at 15:25

## 2023-02-05 RX ADMIN — Medication 725 MILLIGRAM(S): at 12:30

## 2023-02-05 RX ADMIN — HYDROMORPHONE HYDROCHLORIDE 0.5 MILLIGRAM(S): 2 INJECTION INTRAMUSCULAR; INTRAVENOUS; SUBCUTANEOUS at 13:08

## 2023-02-05 RX ADMIN — Medication 20 MILLIGRAM(S): at 06:02

## 2023-02-05 RX ADMIN — Medication 15 MILLIGRAM(S): at 11:50

## 2023-02-05 RX ADMIN — HYDROMORPHONE HYDROCHLORIDE 0.5 MILLIGRAM(S): 2 INJECTION INTRAMUSCULAR; INTRAVENOUS; SUBCUTANEOUS at 19:07

## 2023-02-05 RX ADMIN — OXYCODONE HYDROCHLORIDE 5 MILLIGRAM(S): 5 TABLET ORAL at 02:47

## 2023-02-05 RX ADMIN — Medication 15 MILLIGRAM(S): at 10:59

## 2023-02-05 RX ADMIN — AMPICILLIN SODIUM AND SULBACTAM SODIUM 200 GRAM(S): 250; 125 INJECTION, POWDER, FOR SUSPENSION INTRAMUSCULAR; INTRAVENOUS at 13:12

## 2023-02-05 RX ADMIN — Medication 3 MILLIGRAM(S): at 22:17

## 2023-02-05 RX ADMIN — CHLORHEXIDINE GLUCONATE 15 MILLILITER(S): 213 SOLUTION TOPICAL at 17:28

## 2023-02-05 RX ADMIN — Medication 15 MILLIGRAM(S): at 22:30

## 2023-02-05 RX ADMIN — Medication 650 MILLIGRAM(S): at 06:32

## 2023-02-05 RX ADMIN — Medication 650 MILLIGRAM(S): at 06:02

## 2023-02-05 RX ADMIN — AMPICILLIN SODIUM AND SULBACTAM SODIUM 200 GRAM(S): 250; 125 INJECTION, POWDER, FOR SUSPENSION INTRAMUSCULAR; INTRAVENOUS at 00:35

## 2023-02-05 RX ADMIN — Medication 15 MILLIGRAM(S): at 15:55

## 2023-02-05 RX ADMIN — Medication 15 MILLIGRAM(S): at 22:15

## 2023-02-05 RX ADMIN — ZOLPIDEM TARTRATE 10 MILLIGRAM(S): 10 TABLET ORAL at 22:17

## 2023-02-05 RX ADMIN — OXYCODONE HYDROCHLORIDE 7.5 MILLIGRAM(S): 5 TABLET ORAL at 17:33

## 2023-02-05 RX ADMIN — Medication 25 GRAM(S): at 10:51

## 2023-02-05 RX ADMIN — Medication 290 MILLIGRAM(S): at 17:30

## 2023-02-05 RX ADMIN — Medication 290 MILLIGRAM(S): at 11:54

## 2023-02-05 RX ADMIN — OXYCODONE HYDROCHLORIDE 5 MILLIGRAM(S): 5 TABLET ORAL at 03:17

## 2023-02-05 RX ADMIN — SODIUM CHLORIDE 60 MILLILITER(S): 9 INJECTION, SOLUTION INTRAVENOUS at 09:26

## 2023-02-05 RX ADMIN — Medication 725 MILLIGRAM(S): at 18:00

## 2023-02-05 RX ADMIN — OXYCODONE HYDROCHLORIDE 5 MILLIGRAM(S): 5 TABLET ORAL at 06:31

## 2023-02-05 RX ADMIN — HYDROMORPHONE HYDROCHLORIDE 0.5 MILLIGRAM(S): 2 INJECTION INTRAMUSCULAR; INTRAVENOUS; SUBCUTANEOUS at 18:37

## 2023-02-05 RX ADMIN — AMPICILLIN SODIUM AND SULBACTAM SODIUM 200 GRAM(S): 250; 125 INJECTION, POWDER, FOR SUSPENSION INTRAMUSCULAR; INTRAVENOUS at 18:37

## 2023-02-05 RX ADMIN — Medication 100 MILLIGRAM(S): at 22:16

## 2023-02-05 RX ADMIN — Medication 40 MILLIGRAM(S): at 11:00

## 2023-02-05 RX ADMIN — OXYCODONE HYDROCHLORIDE 7.5 MILLIGRAM(S): 5 TABLET ORAL at 18:03

## 2023-02-05 RX ADMIN — OXYCODONE HYDROCHLORIDE 5 MILLIGRAM(S): 5 TABLET ORAL at 07:01

## 2023-02-05 RX ADMIN — CHLORHEXIDINE GLUCONATE 15 MILLILITER(S): 213 SOLUTION TOPICAL at 06:02

## 2023-02-05 RX ADMIN — HYDROMORPHONE HYDROCHLORIDE 0.5 MILLIGRAM(S): 2 INJECTION INTRAMUSCULAR; INTRAVENOUS; SUBCUTANEOUS at 13:38

## 2023-02-05 RX ADMIN — Medication 10 MILLIGRAM(S): at 15:23

## 2023-02-05 NOTE — PROGRESS NOTE ADULT - PROBLEM SELECTOR PLAN 2
-monitor phosphorous, calcium, potassium, and magnesium levels daily. - last spiked to 101.2 on 2/4 at 10 AM, afebrile since    - no leukocytosis, UA negative, CXR negative   - pt on empiric Unasyn per OMFS   - if spikes again, please obtain blood cultures - last spiked to 101.2 on 2/4 at 10 AM, afebrile since    - no leukocytosis, UA negative, CXR negative   - etiology could be drug withdrawal, drug fever, or reactive   - pt on empiric Unasyn per OMFS   - if spikes again, please obtain blood cultures

## 2023-02-05 NOTE — PROGRESS NOTE ADULT - ASSESSMENT
57 yr old female s/p mechanical fall sustaining mandibular parasymphysis fracture, anterior mandibular alveolar fracture, and bilateral subcondylar fractures. Patient's fractured indicated for operable repair. Patient will undergo ORIF of mandibular fractures in the OR.     Plan:  - ORIF of mandible fracture Monday  - NPO after midnigh  - appreciate med recs  - appreciate psych recs  - nutrition consult    Nany Benitez DDS  [facility]  CRISTINA pager: [phone]   [place]: [phone]  Avaliable on teams

## 2023-02-05 NOTE — PROGRESS NOTE ADULT - ASSESSMENT
HPI: 52 year old F transfer from [facility] for mandibular fracture. She has a hx of anxiety, depression, anorexia, benzodiazapine abuse history.

## 2023-02-05 NOTE — PROGRESS NOTE ADULT - PROBLEM SELECTOR PLAN 3
-psych recs appreciated   - need to taper benzos eventually given risks of falls. -monitor phosphorous, calcium, potassium, and magnesium levels daily.

## 2023-02-05 NOTE — PROGRESS NOTE ADULT - PROBLEM SELECTOR PLAN 5
F-maintenance D5 1/2 NS  E-replete prn  N-NPO per OMFS ONCE DIET STARTED NO TREE NUTS  AC per OMFS. -c/w trazadone 100 mg at bedtime, prozac 40 gm daily.

## 2023-02-05 NOTE — PROGRESS NOTE ADULT - PROBLEM SELECTOR PLAN 4
-c/w trazadone 100 mg at bedtime, prozac 40 gm daily. -psych recs appreciated   - need to taper benzos eventually given risks of falls.

## 2023-02-05 NOTE — PROGRESS NOTE ADULT - PROBLEM SELECTOR PLAN 1
-Patient sustained multiple mandibular fractures after a mechanical fall   -OMFS planning on ORIF of mandibular fractures in the OR  -RCRI 0 for low risk procedure  -mets >4  -F/U EKG if Qtc, NH interval are normal, and no pathologic Q waves or ST elevations patient is medically optimized. If abnormalities noted please reach    - psych to management home meds -Patient sustained multiple mandibular fractures after a mechanical fall   -OMFS planning on ORIF of mandibular fractures in the OR  -RCRI 0 for low risk procedure  -mets >4  - patient is medically optimized and can proceed to ORIF of mandible fracture    - psych to management home meds

## 2023-02-06 ENCOUNTER — TRANSCRIPTION ENCOUNTER (OUTPATIENT)
Age: 53
End: 2023-02-06

## 2023-02-06 LAB
ANION GAP SERPL CALC-SCNC: 6 MMOL/L — LOW (ref 7–14)
ANION GAP SERPL CALC-SCNC: 6 MMOL/L — LOW (ref 7–14)
BUN SERPL-MCNC: 2 MG/DL — LOW (ref 7–23)
BUN SERPL-MCNC: <2 MG/DL — LOW (ref 7–23)
CALCIUM SERPL-MCNC: 8.2 MG/DL — LOW (ref 8.4–10.5)
CALCIUM SERPL-MCNC: 8.4 MG/DL — SIGNIFICANT CHANGE UP (ref 8.4–10.5)
CHLORIDE SERPL-SCNC: 101 MMOL/L — SIGNIFICANT CHANGE UP (ref 98–107)
CHLORIDE SERPL-SCNC: 108 MMOL/L — HIGH (ref 98–107)
CO2 SERPL-SCNC: 32 MMOL/L — HIGH (ref 22–31)
CO2 SERPL-SCNC: 33 MMOL/L — HIGH (ref 22–31)
CREAT SERPL-MCNC: 0.52 MG/DL — SIGNIFICANT CHANGE UP (ref 0.5–1.3)
CREAT SERPL-MCNC: 0.53 MG/DL — SIGNIFICANT CHANGE UP (ref 0.5–1.3)
EGFR: 111 ML/MIN/1.73M2 — SIGNIFICANT CHANGE UP
EGFR: 112 ML/MIN/1.73M2 — SIGNIFICANT CHANGE UP
GLUCOSE SERPL-MCNC: 85 MG/DL — SIGNIFICANT CHANGE UP (ref 70–99)
GLUCOSE SERPL-MCNC: 93 MG/DL — SIGNIFICANT CHANGE UP (ref 70–99)
HCT VFR BLD CALC: 28.4 % — LOW (ref 34.5–45)
HCT VFR BLD CALC: 30.4 % — LOW (ref 34.5–45)
HGB BLD-MCNC: 10.1 G/DL — LOW (ref 11.5–15.5)
HGB BLD-MCNC: 9.9 G/DL — LOW (ref 11.5–15.5)
MAGNESIUM SERPL-MCNC: 1.3 MG/DL — LOW (ref 1.6–2.6)
MAGNESIUM SERPL-MCNC: 1.9 MG/DL — SIGNIFICANT CHANGE UP (ref 1.6–2.6)
MCHC RBC-ENTMCNC: 30.9 PG — SIGNIFICANT CHANGE UP (ref 27–34)
MCHC RBC-ENTMCNC: 31.7 PG — SIGNIFICANT CHANGE UP (ref 27–34)
MCHC RBC-ENTMCNC: 33.2 GM/DL — SIGNIFICANT CHANGE UP (ref 32–36)
MCHC RBC-ENTMCNC: 34.9 GM/DL — SIGNIFICANT CHANGE UP (ref 32–36)
MCV RBC AUTO: 91 FL — SIGNIFICANT CHANGE UP (ref 80–100)
MCV RBC AUTO: 93 FL — SIGNIFICANT CHANGE UP (ref 80–100)
NRBC # BLD: 0 /100 WBCS — SIGNIFICANT CHANGE UP (ref 0–0)
NRBC # BLD: 0 /100 WBCS — SIGNIFICANT CHANGE UP (ref 0–0)
NRBC # FLD: 0 K/UL — SIGNIFICANT CHANGE UP (ref 0–0)
NRBC # FLD: 0 K/UL — SIGNIFICANT CHANGE UP (ref 0–0)
PHOSPHATE SERPL-MCNC: 2.2 MG/DL — LOW (ref 2.5–4.5)
PHOSPHATE SERPL-MCNC: 4.7 MG/DL — HIGH (ref 2.5–4.5)
PLATELET # BLD AUTO: 141 K/UL — LOW (ref 150–400)
PLATELET # BLD AUTO: 164 K/UL — SIGNIFICANT CHANGE UP (ref 150–400)
POTASSIUM SERPL-MCNC: 3.8 MMOL/L — SIGNIFICANT CHANGE UP (ref 3.5–5.3)
POTASSIUM SERPL-MCNC: 4.6 MMOL/L — SIGNIFICANT CHANGE UP (ref 3.5–5.3)
POTASSIUM SERPL-SCNC: 3.8 MMOL/L — SIGNIFICANT CHANGE UP (ref 3.5–5.3)
POTASSIUM SERPL-SCNC: 4.6 MMOL/L — SIGNIFICANT CHANGE UP (ref 3.5–5.3)
RBC # BLD: 3.12 M/UL — LOW (ref 3.8–5.2)
RBC # BLD: 3.27 M/UL — LOW (ref 3.8–5.2)
RBC # FLD: 11.9 % — SIGNIFICANT CHANGE UP (ref 10.3–14.5)
RBC # FLD: 11.9 % — SIGNIFICANT CHANGE UP (ref 10.3–14.5)
SODIUM SERPL-SCNC: 139 MMOL/L — SIGNIFICANT CHANGE UP (ref 135–145)
SODIUM SERPL-SCNC: 147 MMOL/L — HIGH (ref 135–145)
WBC # BLD: 4.16 K/UL — SIGNIFICANT CHANGE UP (ref 3.8–10.5)
WBC # BLD: 4.73 K/UL — SIGNIFICANT CHANGE UP (ref 3.8–10.5)
WBC # FLD AUTO: 4.16 K/UL — SIGNIFICANT CHANGE UP (ref 3.8–10.5)
WBC # FLD AUTO: 4.73 K/UL — SIGNIFICANT CHANGE UP (ref 3.8–10.5)

## 2023-02-06 PROCEDURE — 99232 SBSQ HOSP IP/OBS MODERATE 35: CPT

## 2023-02-06 RX ORDER — NICOTINE POLACRILEX 2 MG
1 GUM BUCCAL DAILY
Refills: 0 | Status: DISCONTINUED | OUTPATIENT
Start: 2023-02-06 | End: 2023-02-10

## 2023-02-06 RX ORDER — POLYETHYLENE GLYCOL 3350 17 G/17G
17 POWDER, FOR SOLUTION ORAL AT BEDTIME
Refills: 0 | Status: DISCONTINUED | OUTPATIENT
Start: 2023-02-06 | End: 2023-02-10

## 2023-02-06 RX ORDER — SODIUM CHLORIDE 9 MG/ML
1000 INJECTION, SOLUTION INTRAVENOUS
Refills: 0 | Status: DISCONTINUED | OUTPATIENT
Start: 2023-02-07 | End: 2023-02-08

## 2023-02-06 RX ORDER — SENNA PLUS 8.6 MG/1
2 TABLET ORAL AT BEDTIME
Refills: 0 | Status: DISCONTINUED | OUTPATIENT
Start: 2023-02-06 | End: 2023-02-10

## 2023-02-06 RX ORDER — POTASSIUM PHOSPHATE, MONOBASIC POTASSIUM PHOSPHATE, DIBASIC 236; 224 MG/ML; MG/ML
30 INJECTION, SOLUTION INTRAVENOUS ONCE
Refills: 0 | Status: COMPLETED | OUTPATIENT
Start: 2023-02-06 | End: 2023-02-06

## 2023-02-06 RX ORDER — MAGNESIUM SULFATE 500 MG/ML
2 VIAL (ML) INJECTION ONCE
Refills: 0 | Status: COMPLETED | OUTPATIENT
Start: 2023-02-06 | End: 2023-02-06

## 2023-02-06 RX ADMIN — OXYCODONE HYDROCHLORIDE 5 MILLIGRAM(S): 5 TABLET ORAL at 17:34

## 2023-02-06 RX ADMIN — Medication 290 MILLIGRAM(S): at 06:01

## 2023-02-06 RX ADMIN — Medication 15 MILLIGRAM(S): at 22:18

## 2023-02-06 RX ADMIN — Medication 3 MILLIGRAM(S): at 22:18

## 2023-02-06 RX ADMIN — OXYCODONE HYDROCHLORIDE 5 MILLIGRAM(S): 5 TABLET ORAL at 17:55

## 2023-02-06 RX ADMIN — AMPICILLIN SODIUM AND SULBACTAM SODIUM 200 GRAM(S): 250; 125 INJECTION, POWDER, FOR SUSPENSION INTRAMUSCULAR; INTRAVENOUS at 01:13

## 2023-02-06 RX ADMIN — Medication 25 GRAM(S): at 06:52

## 2023-02-06 RX ADMIN — Medication 40 MILLIGRAM(S): at 13:34

## 2023-02-06 RX ADMIN — Medication 20 MILLIGRAM(S): at 06:51

## 2023-02-06 RX ADMIN — Medication 15 MILLIGRAM(S): at 03:55

## 2023-02-06 RX ADMIN — HYDROMORPHONE HYDROCHLORIDE 0.5 MILLIGRAM(S): 2 INJECTION INTRAMUSCULAR; INTRAVENOUS; SUBCUTANEOUS at 05:19

## 2023-02-06 RX ADMIN — Medication 15 MILLIGRAM(S): at 09:26

## 2023-02-06 RX ADMIN — AMPICILLIN SODIUM AND SULBACTAM SODIUM 200 GRAM(S): 250; 125 INJECTION, POWDER, FOR SUSPENSION INTRAMUSCULAR; INTRAVENOUS at 22:17

## 2023-02-06 RX ADMIN — HYDROMORPHONE HYDROCHLORIDE 0.5 MILLIGRAM(S): 2 INJECTION INTRAMUSCULAR; INTRAVENOUS; SUBCUTANEOUS at 04:49

## 2023-02-06 RX ADMIN — CHLORHEXIDINE GLUCONATE 15 MILLILITER(S): 213 SOLUTION TOPICAL at 17:32

## 2023-02-06 RX ADMIN — ZOLPIDEM TARTRATE 10 MILLIGRAM(S): 10 TABLET ORAL at 22:17

## 2023-02-06 RX ADMIN — AMPICILLIN SODIUM AND SULBACTAM SODIUM 200 GRAM(S): 250; 125 INJECTION, POWDER, FOR SUSPENSION INTRAMUSCULAR; INTRAVENOUS at 09:34

## 2023-02-06 RX ADMIN — POLYETHYLENE GLYCOL 3350 17 GRAM(S): 17 POWDER, FOR SOLUTION ORAL at 22:19

## 2023-02-06 RX ADMIN — OXYCODONE HYDROCHLORIDE 5 MILLIGRAM(S): 5 TABLET ORAL at 09:34

## 2023-02-06 RX ADMIN — Medication 1 PATCH: at 23:48

## 2023-02-06 RX ADMIN — Medication 100 MILLIGRAM(S): at 22:17

## 2023-02-06 RX ADMIN — HYDROMORPHONE HYDROCHLORIDE 0.5 MILLIGRAM(S): 2 INJECTION INTRAMUSCULAR; INTRAVENOUS; SUBCUTANEOUS at 18:35

## 2023-02-06 RX ADMIN — Medication 15 MILLIGRAM(S): at 15:43

## 2023-02-06 RX ADMIN — CHLORHEXIDINE GLUCONATE 15 MILLILITER(S): 213 SOLUTION TOPICAL at 06:00

## 2023-02-06 RX ADMIN — HYDROMORPHONE HYDROCHLORIDE 0.5 MILLIGRAM(S): 2 INJECTION INTRAMUSCULAR; INTRAVENOUS; SUBCUTANEOUS at 10:36

## 2023-02-06 RX ADMIN — HYDROMORPHONE HYDROCHLORIDE 0.5 MILLIGRAM(S): 2 INJECTION INTRAMUSCULAR; INTRAVENOUS; SUBCUTANEOUS at 10:51

## 2023-02-06 RX ADMIN — SENNA PLUS 2 TABLET(S): 8.6 TABLET ORAL at 22:17

## 2023-02-06 RX ADMIN — POTASSIUM PHOSPHATE, MONOBASIC POTASSIUM PHOSPHATE, DIBASIC 83.33 MILLIMOLE(S): 236; 224 INJECTION, SOLUTION INTRAVENOUS at 09:55

## 2023-02-06 RX ADMIN — OXYCODONE HYDROCHLORIDE 5 MILLIGRAM(S): 5 TABLET ORAL at 10:05

## 2023-02-06 RX ADMIN — OXYCODONE HYDROCHLORIDE 5 MILLIGRAM(S): 5 TABLET ORAL at 13:34

## 2023-02-06 RX ADMIN — Medication 15 MILLIGRAM(S): at 09:45

## 2023-02-06 RX ADMIN — Medication 15 MILLIGRAM(S): at 22:48

## 2023-02-06 RX ADMIN — Medication 725 MILLIGRAM(S): at 06:31

## 2023-02-06 RX ADMIN — Medication 10 MILLIGRAM(S): at 14:06

## 2023-02-06 RX ADMIN — HYDROMORPHONE HYDROCHLORIDE 0.5 MILLIGRAM(S): 2 INJECTION INTRAMUSCULAR; INTRAVENOUS; SUBCUTANEOUS at 18:55

## 2023-02-06 RX ADMIN — AMPICILLIN SODIUM AND SULBACTAM SODIUM 200 GRAM(S): 250; 125 INJECTION, POWDER, FOR SUSPENSION INTRAMUSCULAR; INTRAVENOUS at 15:43

## 2023-02-06 RX ADMIN — OXYCODONE HYDROCHLORIDE 5 MILLIGRAM(S): 5 TABLET ORAL at 14:15

## 2023-02-06 NOTE — PROGRESS NOTE ADULT - ASSESSMENT
57 yr old female s/p mechanical fall sustaining mandibular parasymphysis fracture, anterior mandibular alveolar fracture, and bilateral subcondylar fractures. Patient's fractured indicated for operable repair. Patient will undergo ORIF of mandibular fractures in the OR.     Plan:  - ORIF of mandible fracture today  - NPO   - appreciate med recs  - appreciate psych recs  - nutrition consult    NISH Chappell pager: [phone]   [place]: [phone]  Avaliable on teams

## 2023-02-06 NOTE — PROGRESS NOTE ADULT - ASSESSMENT
52 y.o. F w/ a hx of anxiety, anorexia, substance use d/o who is transferred from [facility] for mandibular fracture awaiting ORIF of mandibular fracture.     # Mandibular fracture   - Sustained after mechanical fall   [ ] ORIF of mandibular fracture planned for tomorrow   - Pain control: Toradol, Dilaudid, Oxycodone PRN   - Has not had Bm during hospitalization- would start laxatives- Senna + miralax     # Fever   - Spiked fever to 101.2 on 2/4, has been afebrile since then. No s/s of infection.   - u/a, CXR, negative   - Patient on empiric Unasyn per OMFS- Can consider discontinuing since no s/s of infection noted.     # Anemia  - Hbg 14 on admission, then 11-12 now down to 9.9. No acute bleeding noted. May be dilutional 2/2 IVF.   - Re-check CBC this PM.   - Would transfuse if Hbg less than 7  - If hbg continues to down-trend, please check reticulocyte count, iron studies, LDH, haptoglobin     # Anorexia   - w/ hypophosphatemia and hypomagnesemia- would replete aggressively   - Consider repeating BMP to ensure Mag and Phos adequately replete prior to OR tomorrow     # Hx of substance use d/o   - Reports hx of benzo and etoh abuse. Now drinks Etoh socially and uses Valium as prescribed by her Psychiatrist   - Cont Valium per [facility] reccs     # MDD   - Cont Trazafone, Prozac

## 2023-02-07 LAB
ALBUMIN SERPL ELPH-MCNC: 2.9 G/DL — LOW (ref 3.3–5)
ALP SERPL-CCNC: 33 U/L — LOW (ref 40–120)
ALT FLD-CCNC: 7 U/L — SIGNIFICANT CHANGE UP (ref 4–33)
ANION GAP SERPL CALC-SCNC: 5 MMOL/L — LOW (ref 7–14)
ANION GAP SERPL CALC-SCNC: 9 MMOL/L — SIGNIFICANT CHANGE UP (ref 7–14)
APTT BLD: 31.8 SEC — SIGNIFICANT CHANGE UP (ref 27–36.3)
AST SERPL-CCNC: 13 U/L — SIGNIFICANT CHANGE UP (ref 4–32)
BILIRUB SERPL-MCNC: <0.2 MG/DL — SIGNIFICANT CHANGE UP (ref 0.2–1.2)
BUN SERPL-MCNC: <2 MG/DL — LOW (ref 7–23)
BUN SERPL-MCNC: <2 MG/DL — LOW (ref 7–23)
CALCIUM SERPL-MCNC: 8.2 MG/DL — LOW (ref 8.4–10.5)
CALCIUM SERPL-MCNC: 8.2 MG/DL — LOW (ref 8.4–10.5)
CHLORIDE SERPL-SCNC: 105 MMOL/L — SIGNIFICANT CHANGE UP (ref 98–107)
CHLORIDE SERPL-SCNC: 106 MMOL/L — SIGNIFICANT CHANGE UP (ref 98–107)
CO2 SERPL-SCNC: 31 MMOL/L — SIGNIFICANT CHANGE UP (ref 22–31)
CO2 SERPL-SCNC: 33 MMOL/L — HIGH (ref 22–31)
CREAT SERPL-MCNC: 0.49 MG/DL — LOW (ref 0.5–1.3)
CREAT SERPL-MCNC: 0.55 MG/DL — SIGNIFICANT CHANGE UP (ref 0.5–1.3)
EGFR: 110 ML/MIN/1.73M2 — SIGNIFICANT CHANGE UP
EGFR: 113 ML/MIN/1.73M2 — SIGNIFICANT CHANGE UP
GLUCOSE SERPL-MCNC: 85 MG/DL — SIGNIFICANT CHANGE UP (ref 70–99)
GLUCOSE SERPL-MCNC: 91 MG/DL — SIGNIFICANT CHANGE UP (ref 70–99)
HCG SERPL-ACNC: <5 MIU/ML — SIGNIFICANT CHANGE UP
HCT VFR BLD CALC: 27.8 % — LOW (ref 34.5–45)
HCT VFR BLD CALC: 28 % — LOW (ref 34.5–45)
HGB BLD-MCNC: 9 G/DL — LOW (ref 11.5–15.5)
HGB BLD-MCNC: 9.4 G/DL — LOW (ref 11.5–15.5)
INR BLD: 1.03 RATIO — SIGNIFICANT CHANGE UP (ref 0.88–1.16)
LDH SERPL L TO P-CCNC: 117 U/L — LOW (ref 135–225)
MAGNESIUM SERPL-MCNC: 1.6 MG/DL — SIGNIFICANT CHANGE UP (ref 1.6–2.6)
MCHC RBC-ENTMCNC: 31.1 PG — SIGNIFICANT CHANGE UP (ref 27–34)
MCHC RBC-ENTMCNC: 31.6 PG — SIGNIFICANT CHANGE UP (ref 27–34)
MCHC RBC-ENTMCNC: 32.4 GM/DL — SIGNIFICANT CHANGE UP (ref 32–36)
MCHC RBC-ENTMCNC: 33.6 GM/DL — SIGNIFICANT CHANGE UP (ref 32–36)
MCV RBC AUTO: 94.3 FL — SIGNIFICANT CHANGE UP (ref 80–100)
MCV RBC AUTO: 96.2 FL — SIGNIFICANT CHANGE UP (ref 80–100)
NRBC # BLD: 0 /100 WBCS — SIGNIFICANT CHANGE UP (ref 0–0)
NRBC # BLD: 0 /100 WBCS — SIGNIFICANT CHANGE UP (ref 0–0)
NRBC # FLD: 0 K/UL — SIGNIFICANT CHANGE UP (ref 0–0)
NRBC # FLD: 0 K/UL — SIGNIFICANT CHANGE UP (ref 0–0)
PHOSPHATE SERPL-MCNC: 4 MG/DL — SIGNIFICANT CHANGE UP (ref 2.5–4.5)
PLATELET # BLD AUTO: 125 K/UL — LOW (ref 150–400)
PLATELET # BLD AUTO: 139 K/UL — LOW (ref 150–400)
POTASSIUM SERPL-MCNC: 4.2 MMOL/L — SIGNIFICANT CHANGE UP (ref 3.5–5.3)
POTASSIUM SERPL-MCNC: 4.5 MMOL/L — SIGNIFICANT CHANGE UP (ref 3.5–5.3)
POTASSIUM SERPL-SCNC: 4.2 MMOL/L — SIGNIFICANT CHANGE UP (ref 3.5–5.3)
POTASSIUM SERPL-SCNC: 4.5 MMOL/L — SIGNIFICANT CHANGE UP (ref 3.5–5.3)
PROT SERPL-MCNC: 4.7 G/DL — LOW (ref 6–8.3)
PROTHROM AB SERPL-ACNC: 12 SEC — SIGNIFICANT CHANGE UP (ref 10.5–13.4)
RBC # BLD: 2.89 M/UL — LOW (ref 3.8–5.2)
RBC # BLD: 2.89 M/UL — LOW (ref 3.8–5.2)
RBC # BLD: 2.97 M/UL — LOW (ref 3.8–5.2)
RBC # FLD: 12 % — SIGNIFICANT CHANGE UP (ref 10.3–14.5)
RBC # FLD: 12.2 % — SIGNIFICANT CHANGE UP (ref 10.3–14.5)
RETICS #: 85.3 K/UL — SIGNIFICANT CHANGE UP (ref 25–125)
RETICS/RBC NFR: 3 % — HIGH (ref 0.5–2.5)
SODIUM SERPL-SCNC: 144 MMOL/L — SIGNIFICANT CHANGE UP (ref 135–145)
SODIUM SERPL-SCNC: 145 MMOL/L — SIGNIFICANT CHANGE UP (ref 135–145)
WBC # BLD: 3.14 K/UL — LOW (ref 3.8–10.5)
WBC # BLD: 3.82 K/UL — SIGNIFICANT CHANGE UP (ref 3.8–10.5)
WBC # FLD AUTO: 3.14 K/UL — LOW (ref 3.8–10.5)
WBC # FLD AUTO: 3.82 K/UL — SIGNIFICANT CHANGE UP (ref 3.8–10.5)

## 2023-02-07 PROCEDURE — 99232 SBSQ HOSP IP/OBS MODERATE 35: CPT

## 2023-02-07 DEVICE — IMPLANTABLE DEVICE: Type: IMPLANTABLE DEVICE | Status: FUNCTIONAL

## 2023-02-07 DEVICE — SCREW 2.0X13MM: Type: IMPLANTABLE DEVICE | Status: FUNCTIONAL

## 2023-02-07 DEVICE — SCREW LOKG 2X15MM: Type: IMPLANTABLE DEVICE | Status: FUNCTIONAL

## 2023-02-07 DEVICE — SCREW LOCKING 2X13MM: Type: IMPLANTABLE DEVICE | Status: FUNCTIONAL

## 2023-02-07 DEVICE — SCREW MAXDRIVE CMF LVL 1 2X8MM: Type: IMPLANTABLE DEVICE | Status: FUNCTIONAL

## 2023-02-07 RX ORDER — SODIUM CHLORIDE 9 MG/ML
500 INJECTION, SOLUTION INTRAVENOUS ONCE
Refills: 0 | Status: COMPLETED | OUTPATIENT
Start: 2023-02-07 | End: 2023-02-07

## 2023-02-07 RX ORDER — IBUPROFEN 200 MG
600 TABLET ORAL EVERY 6 HOURS
Refills: 0 | Status: DISCONTINUED | OUTPATIENT
Start: 2023-02-07 | End: 2023-02-10

## 2023-02-07 RX ORDER — ACETAMINOPHEN 500 MG
650 TABLET ORAL EVERY 6 HOURS
Refills: 0 | Status: DISCONTINUED | OUTPATIENT
Start: 2023-02-07 | End: 2023-02-10

## 2023-02-07 RX ORDER — ONDANSETRON 8 MG/1
4 TABLET, FILM COATED ORAL ONCE
Refills: 0 | Status: DISCONTINUED | OUTPATIENT
Start: 2023-02-07 | End: 2023-02-07

## 2023-02-07 RX ORDER — HYDROMORPHONE HYDROCHLORIDE 2 MG/ML
0.5 INJECTION INTRAMUSCULAR; INTRAVENOUS; SUBCUTANEOUS
Refills: 0 | Status: DISCONTINUED | OUTPATIENT
Start: 2023-02-07 | End: 2023-02-07

## 2023-02-07 RX ORDER — ALBUMIN HUMAN 25 %
250 VIAL (ML) INTRAVENOUS ONCE
Refills: 0 | Status: COMPLETED | OUTPATIENT
Start: 2023-02-07 | End: 2023-02-07

## 2023-02-07 RX ORDER — OXYCODONE HYDROCHLORIDE 5 MG/1
5 TABLET ORAL EVERY 4 HOURS
Refills: 0 | Status: DISCONTINUED | OUTPATIENT
Start: 2023-02-07 | End: 2023-02-10

## 2023-02-07 RX ORDER — DIAZEPAM 5 MG
20 TABLET ORAL
Refills: 0 | Status: DISCONTINUED | OUTPATIENT
Start: 2023-02-07 | End: 2023-02-08

## 2023-02-07 RX ORDER — DIAZEPAM 5 MG
10 TABLET ORAL
Refills: 0 | Status: DISCONTINUED | OUTPATIENT
Start: 2023-02-07 | End: 2023-02-08

## 2023-02-07 RX ORDER — OXYCODONE HYDROCHLORIDE 5 MG/1
10 TABLET ORAL EVERY 4 HOURS
Refills: 0 | Status: DISCONTINUED | OUTPATIENT
Start: 2023-02-07 | End: 2023-02-08

## 2023-02-07 RX ORDER — OXYCODONE HYDROCHLORIDE 5 MG/1
5 TABLET ORAL ONCE
Refills: 0 | Status: DISCONTINUED | OUTPATIENT
Start: 2023-02-07 | End: 2023-02-07

## 2023-02-07 RX ADMIN — Medication 15 MILLIGRAM(S): at 03:55

## 2023-02-07 RX ADMIN — Medication 20 MILLIGRAM(S): at 07:14

## 2023-02-07 RX ADMIN — Medication 15 MILLIGRAM(S): at 12:14

## 2023-02-07 RX ADMIN — AMPICILLIN SODIUM AND SULBACTAM SODIUM 200 GRAM(S): 250; 125 INJECTION, POWDER, FOR SUSPENSION INTRAMUSCULAR; INTRAVENOUS at 03:25

## 2023-02-07 RX ADMIN — HYDROMORPHONE HYDROCHLORIDE 0.5 MILLIGRAM(S): 2 INJECTION INTRAMUSCULAR; INTRAVENOUS; SUBCUTANEOUS at 22:06

## 2023-02-07 RX ADMIN — Medication 650 MILLIGRAM(S): at 18:28

## 2023-02-07 RX ADMIN — HYDROMORPHONE HYDROCHLORIDE 0.5 MILLIGRAM(S): 2 INJECTION INTRAMUSCULAR; INTRAVENOUS; SUBCUTANEOUS at 12:49

## 2023-02-07 RX ADMIN — Medication 1 PATCH: at 07:31

## 2023-02-07 RX ADMIN — SODIUM CHLORIDE 40 MILLILITER(S): 9 INJECTION, SOLUTION INTRAVENOUS at 05:32

## 2023-02-07 RX ADMIN — Medication 5 MILLIGRAM(S): at 03:48

## 2023-02-07 RX ADMIN — Medication 650 MILLIGRAM(S): at 17:58

## 2023-02-07 RX ADMIN — OXYCODONE HYDROCHLORIDE 10 MILLIGRAM(S): 5 TABLET ORAL at 19:33

## 2023-02-07 RX ADMIN — Medication 15 MILLIGRAM(S): at 03:25

## 2023-02-07 RX ADMIN — HYDROMORPHONE HYDROCHLORIDE 0.5 MILLIGRAM(S): 2 INJECTION INTRAMUSCULAR; INTRAVENOUS; SUBCUTANEOUS at 13:15

## 2023-02-07 RX ADMIN — CHLORHEXIDINE GLUCONATE 15 MILLILITER(S): 213 SOLUTION TOPICAL at 19:45

## 2023-02-07 RX ADMIN — AMPICILLIN SODIUM AND SULBACTAM SODIUM 200 GRAM(S): 250; 125 INJECTION, POWDER, FOR SUSPENSION INTRAMUSCULAR; INTRAVENOUS at 15:59

## 2023-02-07 RX ADMIN — CHLORHEXIDINE GLUCONATE 15 MILLILITER(S): 213 SOLUTION TOPICAL at 05:33

## 2023-02-07 RX ADMIN — HYDROMORPHONE HYDROCHLORIDE 0.5 MILLIGRAM(S): 2 INJECTION INTRAMUSCULAR; INTRAVENOUS; SUBCUTANEOUS at 12:45

## 2023-02-07 RX ADMIN — AMPICILLIN SODIUM AND SULBACTAM SODIUM 200 GRAM(S): 250; 125 INJECTION, POWDER, FOR SUSPENSION INTRAMUSCULAR; INTRAVENOUS at 22:06

## 2023-02-07 RX ADMIN — OXYCODONE HYDROCHLORIDE 10 MILLIGRAM(S): 5 TABLET ORAL at 19:03

## 2023-02-07 RX ADMIN — HYDROMORPHONE HYDROCHLORIDE 0.5 MILLIGRAM(S): 2 INJECTION INTRAMUSCULAR; INTRAVENOUS; SUBCUTANEOUS at 12:20

## 2023-02-07 RX ADMIN — Medication 15 MILLIGRAM(S): at 12:20

## 2023-02-07 RX ADMIN — SODIUM CHLORIDE 1000 MILLILITER(S): 9 INJECTION, SOLUTION INTRAVENOUS at 13:37

## 2023-02-07 RX ADMIN — Medication 125 MILLILITER(S): at 14:22

## 2023-02-07 RX ADMIN — HYDROMORPHONE HYDROCHLORIDE 0.5 MILLIGRAM(S): 2 INJECTION INTRAMUSCULAR; INTRAVENOUS; SUBCUTANEOUS at 23:00

## 2023-02-07 NOTE — PRE-OP CHECKLIST - SELECT TESTS ORDERED
BMP/CBC/Type and Cross/Type and Screen/COVID-19 BMP/CBC/PT/PTT/Type and Cross/Type and Screen/EKG/COVID-19

## 2023-02-07 NOTE — PROGRESS NOTE ADULT - ASSESSMENT
57 yr old female s/p mechanical fall sustaining mandibular parasymphysis fracture, anterior mandibular alveolar fracture, and bilateral subcondylar fractures. Patient's fractured indicated for operable repair. Patient will undergo ORIF of mandibular fractures in the OR.     Plan:  - ORIF of mandible fracture today  - NPO   - appreciate med recs  - appreciate psych recs  - nutrition consult    NISH Chappell pager: [phone]   [place]: [phone]  Avaliable on teams

## 2023-02-07 NOTE — PROGRESS NOTE ADULT - ASSESSMENT
52 y.o. F w/ a hx of anxiety, anorexia, substance use d/o who is transferred from [facility] for mandibular fracture awaiting ORIF of mandibular fracture.     # Mandibular fracture   - Sustained after mechanical fall   - S/p ORIF of mandibular fracture 2/7  - Pain control: Tylenol, Dilaudid, Oxycodone PRN   - Has not had Bm during hospitalization- would start laxatives- Senna + miralax     # Fever   - Spiked fever to 101.2 on 2/4, has been afebrile since then. No s/s of infection.   - u/a, CXR, negative   - Patient on empiric Unasyn per OMFS- Can consider discontinuing since no s/s of infection noted.     # Anemia  - Hbg 14 on admission, then 11-12 now down to 9's No acute bleeding noted. May be dilutional 2/2 IVF.   - Would transfuse if Hbg less than 7  [ ] Please check reticulocyte count, iron studies, LDH, haptoglobin, hepatic function panel with next set of labs    # Anorexia   - w/ hypophosphatemia and hypomagnesemia now normalized   - Monitor electrolytes daily     # Hx of substance use d/o   - Reports hx of benzo and etoh abuse. Now drinks Etoh socially and uses Valium as prescribed by her Psychiatrist   - Cont Valium per [facility] reccs     # MDD   - Cont Trazafone, Prozac

## 2023-02-08 ENCOUNTER — TRANSCRIPTION ENCOUNTER (OUTPATIENT)
Age: 53
End: 2023-02-08

## 2023-02-08 LAB
ALBUMIN SERPL ELPH-MCNC: 3.2 G/DL — LOW (ref 3.3–5)
ALP SERPL-CCNC: 43 U/L — SIGNIFICANT CHANGE UP (ref 40–120)
ALT FLD-CCNC: 8 U/L — SIGNIFICANT CHANGE UP (ref 4–33)
ANION GAP SERPL CALC-SCNC: 9 MMOL/L — SIGNIFICANT CHANGE UP (ref 7–14)
AST SERPL-CCNC: 14 U/L — SIGNIFICANT CHANGE UP (ref 4–32)
BILIRUB DIRECT SERPL-MCNC: <0.2 MG/DL — SIGNIFICANT CHANGE UP (ref 0–0.3)
BILIRUB INDIRECT FLD-MCNC: >0 MG/DL — SIGNIFICANT CHANGE UP (ref 0–1)
BILIRUB SERPL-MCNC: 0.2 MG/DL — SIGNIFICANT CHANGE UP (ref 0.2–1.2)
BUN SERPL-MCNC: <2 MG/DL — LOW (ref 7–23)
CALCIUM SERPL-MCNC: 8.4 MG/DL — SIGNIFICANT CHANGE UP (ref 8.4–10.5)
CHLORIDE SERPL-SCNC: 98 MMOL/L — SIGNIFICANT CHANGE UP (ref 98–107)
CO2 SERPL-SCNC: 34 MMOL/L — HIGH (ref 22–31)
CREAT SERPL-MCNC: 0.58 MG/DL — SIGNIFICANT CHANGE UP (ref 0.5–1.3)
EGFR: 109 ML/MIN/1.73M2 — SIGNIFICANT CHANGE UP
FERRITIN SERPL-MCNC: 154 NG/ML — HIGH (ref 15–150)
GLUCOSE SERPL-MCNC: 118 MG/DL — HIGH (ref 70–99)
HAPTOGLOB SERPL-MCNC: 120 MG/DL — SIGNIFICANT CHANGE UP (ref 34–200)
HCT VFR BLD CALC: 33.9 % — LOW (ref 34.5–45)
HGB BLD-MCNC: 11.1 G/DL — LOW (ref 11.5–15.5)
IRON SATN MFR SERPL: 12 % — LOW (ref 14–50)
IRON SATN MFR SERPL: 20 UG/DL — LOW (ref 30–160)
MAGNESIUM SERPL-MCNC: 1.2 MG/DL — LOW (ref 1.6–2.6)
MCHC RBC-ENTMCNC: 31.8 PG — SIGNIFICANT CHANGE UP (ref 27–34)
MCHC RBC-ENTMCNC: 32.7 GM/DL — SIGNIFICANT CHANGE UP (ref 32–36)
MCV RBC AUTO: 97.1 FL — SIGNIFICANT CHANGE UP (ref 80–100)
NRBC # BLD: 0 /100 WBCS — SIGNIFICANT CHANGE UP (ref 0–0)
NRBC # FLD: 0 K/UL — SIGNIFICANT CHANGE UP (ref 0–0)
NT-PROBNP SERPL-SCNC: 1689 PG/ML — HIGH
PHOSPHATE SERPL-MCNC: 3.8 MG/DL — SIGNIFICANT CHANGE UP (ref 2.5–4.5)
PLATELET # BLD AUTO: 160 K/UL — SIGNIFICANT CHANGE UP (ref 150–400)
POTASSIUM SERPL-MCNC: 4.1 MMOL/L — SIGNIFICANT CHANGE UP (ref 3.5–5.3)
POTASSIUM SERPL-SCNC: 4.1 MMOL/L — SIGNIFICANT CHANGE UP (ref 3.5–5.3)
PROT SERPL-MCNC: 5.5 G/DL — LOW (ref 6–8.3)
RBC # BLD: 3.49 M/UL — LOW (ref 3.8–5.2)
RBC # FLD: 12.1 % — SIGNIFICANT CHANGE UP (ref 10.3–14.5)
SODIUM SERPL-SCNC: 141 MMOL/L — SIGNIFICANT CHANGE UP (ref 135–145)
TIBC SERPL-MCNC: 161 UG/DL — LOW (ref 220–430)
TRANSFERRIN SERPL-MCNC: 142 MG/DL — LOW (ref 200–360)
UIBC SERPL-MCNC: 141 UG/DL — SIGNIFICANT CHANGE UP (ref 110–370)
WBC # BLD: 5.03 K/UL — SIGNIFICANT CHANGE UP (ref 3.8–10.5)
WBC # FLD AUTO: 5.03 K/UL — SIGNIFICANT CHANGE UP (ref 3.8–10.5)

## 2023-02-08 PROCEDURE — 99232 SBSQ HOSP IP/OBS MODERATE 35: CPT

## 2023-02-08 PROCEDURE — 93306 TTE W/DOPPLER COMPLETE: CPT | Mod: 26

## 2023-02-08 PROCEDURE — 71045 X-RAY EXAM CHEST 1 VIEW: CPT | Mod: 26

## 2023-02-08 RX ORDER — CHLORHEXIDINE GLUCONATE 213 G/1000ML
15 SOLUTION TOPICAL
Qty: 1 | Refills: 0
Start: 2023-02-08

## 2023-02-08 RX ORDER — ZOLPIDEM TARTRATE 10 MG/1
0 TABLET ORAL
Qty: 0 | Refills: 0 | DISCHARGE

## 2023-02-08 RX ORDER — DIAZEPAM 5 MG
20 TABLET ORAL
Qty: 0 | Refills: 0 | DISCHARGE

## 2023-02-08 RX ORDER — FUROSEMIDE 40 MG
20 TABLET ORAL ONCE
Refills: 0 | Status: COMPLETED | OUTPATIENT
Start: 2023-02-08 | End: 2023-02-08

## 2023-02-08 RX ORDER — ZOLPIDEM TARTRATE 10 MG/1
1 TABLET ORAL
Qty: 0 | Refills: 0 | DISCHARGE

## 2023-02-08 RX ORDER — DIAZEPAM 5 MG
20 TABLET ORAL
Refills: 0 | Status: DISCONTINUED | OUTPATIENT
Start: 2023-02-08 | End: 2023-02-09

## 2023-02-08 RX ORDER — ENOXAPARIN SODIUM 100 MG/ML
40 INJECTION SUBCUTANEOUS EVERY 24 HOURS
Refills: 0 | Status: DISCONTINUED | OUTPATIENT
Start: 2023-02-08 | End: 2023-02-10

## 2023-02-08 RX ORDER — IBUPROFEN 200 MG
30 TABLET ORAL
Qty: 840 | Refills: 0
Start: 2023-02-08 | End: 2023-02-14

## 2023-02-08 RX ORDER — DIAZEPAM 5 MG
10 TABLET ORAL
Refills: 0 | Status: DISCONTINUED | OUTPATIENT
Start: 2023-02-08 | End: 2023-02-09

## 2023-02-08 RX ORDER — ZOLPIDEM TARTRATE 10 MG/1
10 TABLET ORAL AT BEDTIME
Refills: 0 | Status: DISCONTINUED | OUTPATIENT
Start: 2023-02-08 | End: 2023-02-10

## 2023-02-08 RX ORDER — FUROSEMIDE 40 MG
20 TABLET ORAL ONCE
Refills: 0 | Status: DISCONTINUED | OUTPATIENT
Start: 2023-02-08 | End: 2023-02-08

## 2023-02-08 RX ORDER — OXYCODONE HYDROCHLORIDE 5 MG/1
10 TABLET ORAL
Qty: 150 | Refills: 0
Start: 2023-02-08 | End: 2023-02-12

## 2023-02-08 RX ORDER — ACETAMINOPHEN 500 MG
20.31 TABLET ORAL
Qty: 568.68 | Refills: 0
Start: 2023-02-08 | End: 2023-02-14

## 2023-02-08 RX ORDER — FLUOXETINE HCL 10 MG
0 CAPSULE ORAL
Qty: 0 | Refills: 0 | DISCHARGE

## 2023-02-08 RX ORDER — HYDROMORPHONE HYDROCHLORIDE 2 MG/ML
0.5 INJECTION INTRAMUSCULAR; INTRAVENOUS; SUBCUTANEOUS EVERY 6 HOURS
Refills: 0 | Status: DISCONTINUED | OUTPATIENT
Start: 2023-02-08 | End: 2023-02-10

## 2023-02-08 RX ORDER — TRAZODONE HCL 50 MG
0 TABLET ORAL
Qty: 0 | Refills: 0 | DISCHARGE

## 2023-02-08 RX ORDER — MAGNESIUM SULFATE 500 MG/ML
2 VIAL (ML) INJECTION ONCE
Refills: 0 | Status: COMPLETED | OUTPATIENT
Start: 2023-02-08 | End: 2023-02-08

## 2023-02-08 RX ADMIN — Medication 650 MILLIGRAM(S): at 06:28

## 2023-02-08 RX ADMIN — Medication 20 MILLIGRAM(S): at 06:28

## 2023-02-08 RX ADMIN — ENOXAPARIN SODIUM 40 MILLIGRAM(S): 100 INJECTION SUBCUTANEOUS at 14:26

## 2023-02-08 RX ADMIN — CHLORHEXIDINE GLUCONATE 15 MILLILITER(S): 213 SOLUTION TOPICAL at 06:28

## 2023-02-08 RX ADMIN — AMPICILLIN SODIUM AND SULBACTAM SODIUM 200 GRAM(S): 250; 125 INJECTION, POWDER, FOR SUSPENSION INTRAMUSCULAR; INTRAVENOUS at 04:36

## 2023-02-08 RX ADMIN — Medication 10 MILLIGRAM(S): at 14:25

## 2023-02-08 RX ADMIN — AMPICILLIN SODIUM AND SULBACTAM SODIUM 200 GRAM(S): 250; 125 INJECTION, POWDER, FOR SUSPENSION INTRAMUSCULAR; INTRAVENOUS at 10:12

## 2023-02-08 RX ADMIN — Medication 650 MILLIGRAM(S): at 23:12

## 2023-02-08 RX ADMIN — Medication 40 MILLIGRAM(S): at 14:25

## 2023-02-08 RX ADMIN — OXYCODONE HYDROCHLORIDE 5 MILLIGRAM(S): 5 TABLET ORAL at 16:29

## 2023-02-08 RX ADMIN — Medication 20 MILLIGRAM(S): at 14:26

## 2023-02-08 RX ADMIN — HYDROMORPHONE HYDROCHLORIDE 0.5 MILLIGRAM(S): 2 INJECTION INTRAMUSCULAR; INTRAVENOUS; SUBCUTANEOUS at 18:16

## 2023-02-08 RX ADMIN — POLYETHYLENE GLYCOL 3350 17 GRAM(S): 17 POWDER, FOR SOLUTION ORAL at 23:05

## 2023-02-08 RX ADMIN — AMPICILLIN SODIUM AND SULBACTAM SODIUM 200 GRAM(S): 250; 125 INJECTION, POWDER, FOR SUSPENSION INTRAMUSCULAR; INTRAVENOUS at 16:27

## 2023-02-08 RX ADMIN — HYDROMORPHONE HYDROCHLORIDE 0.5 MILLIGRAM(S): 2 INJECTION INTRAMUSCULAR; INTRAVENOUS; SUBCUTANEOUS at 06:20

## 2023-02-08 RX ADMIN — Medication 1 PATCH: at 22:59

## 2023-02-08 RX ADMIN — Medication 650 MILLIGRAM(S): at 11:37

## 2023-02-08 RX ADMIN — Medication 25 GRAM(S): at 10:12

## 2023-02-08 RX ADMIN — CHLORHEXIDINE GLUCONATE 15 MILLILITER(S): 213 SOLUTION TOPICAL at 18:16

## 2023-02-08 RX ADMIN — AMPICILLIN SODIUM AND SULBACTAM SODIUM 200 GRAM(S): 250; 125 INJECTION, POWDER, FOR SUSPENSION INTRAMUSCULAR; INTRAVENOUS at 22:18

## 2023-02-08 RX ADMIN — HYDROMORPHONE HYDROCHLORIDE 0.5 MILLIGRAM(S): 2 INJECTION INTRAMUSCULAR; INTRAVENOUS; SUBCUTANEOUS at 04:36

## 2023-02-08 RX ADMIN — Medication 100 MILLIGRAM(S): at 23:13

## 2023-02-08 RX ADMIN — Medication 650 MILLIGRAM(S): at 06:30

## 2023-02-08 RX ADMIN — Medication 650 MILLIGRAM(S): at 12:07

## 2023-02-08 RX ADMIN — OXYCODONE HYDROCHLORIDE 5 MILLIGRAM(S): 5 TABLET ORAL at 11:38

## 2023-02-08 NOTE — PROGRESS NOTE ADULT - ASSESSMENT
57 yr old female s/p mechanical fall sustaining mandibular parasymphysis fracture, anterior mandibular alveolar fracture, and bilateral subcondylar fractures. Patient's fractured indicated for operable repair. Patient went to the OR yesterday for ORIF of mandibular symphasis and b/l condylar fractures via ORIF of symphasis fracture and modified closed reduction of b/l subcondylar fractures via mandibular arch bar placement, maxillary IMF screws and elastics.     Plan:  - Planning for discharge today / tomorrow   - FLD   - Continue w/ multimodal pain control   - Continue IV abx while inpatient   - Appreciate medicine recommendations     Rashad Avila DDS  [facility] pager: [phone]   Available on teams

## 2023-02-08 NOTE — DISCHARGE NOTE PROVIDER - NSDCMRMEDTOKEN_GEN_ALL_CORE_FT
acetaminophen 160 mg/5 mL oral suspension: 20.31 milliliter(s) orally every 6 hours  Augmentin 250 mg-62.5 mg/5 mL oral liquid: 35 milliliter(s) orally 2 times a day   chlorhexidine 0.12% mucous membrane liquid: 15 milliliter(s) mucous membrane 2 times a day  ibuprofen 100 mg/5 mL oral suspension: 30 milliliter(s) orally every 6 hours  oxyCODONE 5 mg/5 mL oral solution: 10 milliliter(s) orally every 8 hours MDD:30mg   amoxicillin-clavulanate 875 mg-125 mg oral tablet: 1 milligram(s) orally 2 times a day   chlorhexidine 0.12% mucous membrane liquid: 15 milliliter(s) mucous membrane 2 times a day  FLUoxetine 40 mg oral capsule: 1 cap(s) orally once a day  Outpatient Physical Therapy: Dx: Mandibular Fracture         Weakness  oxyCODONE 5 mg/5 mL oral solution: 10 milliliter(s) orally every 8 hours, As Needed MDD:30mg   polyethylene glycol 3350 oral powder for reconstitution: 17 gram(s) orally once a day (at bedtime)  senna leaf extract oral tablet: 2 tab(s) orally once a day (at bedtime)  traZODone 100 mg oral tablet: 1 tab(s) orally once a day (at bedtime)   amoxicillin-clavulanate 875 mg-125 mg oral tablet: 1 milligram(s) orally 2 times a day   chlorhexidine 0.12% mucous membrane liquid: 15 milliliter(s) mucous membrane 2 times a day  FLUoxetine 40 mg oral capsule: 1 cap(s) orally once a day  Outpatient Physical Therapy: Dx: Mandibular Fracture         Weakness  oxyCODONE 5 mg oral capsule: 1 cap(s) orally every 6 hours, As Needed MDD:4   polyethylene glycol 3350 oral powder for reconstitution: 17 gram(s) orally once a day (at bedtime)  senna leaf extract oral tablet: 2 tab(s) orally once a day (at bedtime)  traZODone 100 mg oral tablet: 1 tab(s) orally once a day (at bedtime)

## 2023-02-08 NOTE — PHYSICAL THERAPY INITIAL EVALUATION ADULT - ADDITIONAL COMMENTS
Pt lives in a private house with 2 exterior steps to enter. + flight of stairs within home to second floor bedroom. Pt has means to stay on first floor if necessary. Prior to admission, pt ambulated independently, no assistive device.

## 2023-02-08 NOTE — DISCHARGE NOTE PROVIDER - CARE PROVIDER_API CALL
Garry Pelletier (DDS; MD)  OralMaxillofacial Surgery  [address] [place] [address]  Phone: [phone]  Fax: [phone]  Follow Up Time: 1 week   Garry Pelletier (DDS; MD)  OralMaxillofacial Surgery  [address] [place] [address]  Phone: [phone]  Fax: [phone]  Follow Up Time: 1 week    Amanda Villa (DO)  Family Medicine  [address] [place] [address]  Phone: [phone]  Fax: [phone]  Follow Up Time:

## 2023-02-08 NOTE — DISCHARGE NOTE PROVIDER - NSDCCPTREATMENT_GEN_ALL_CORE_FT
PRINCIPAL PROCEDURE  Procedure: Open reduction and internal fixation (ORIF) of fracture of mandible with wiring of denture  Findings and Treatment: ORIF of Symphysic fracture + Closed reduction of Bilateral condylar fractures + Repair of facial laceration

## 2023-02-08 NOTE — DISCHARGE NOTE PROVIDER - HOSPITAL COURSE
History of Present Illness:    51yo F transfer from [place] for OMFS care in the setting of known mandibular fracture status post fall overnight while walking to the bathroom.  Patient was in her usual state of health prior to this fall with no prodromal headache chest pain lightheadedness or shortness of breath.  Patient with jaw pain.  No headache neck pain back pain or joint pains.  Vision intact no nausea or vomiting    HD2  patient visited bedside, resting comfortably. AVSS. NAEON. patient refused EKG overnight, will attempt again in morning. Patient planned for ORIF mandibular fractures today in the OR with Dr. Jordan. medicine and psych following. nutrition consulted.   HD3: Patient seen and evaluated at bedside this morning. Resting comfortably in bed, in NAD. AVSS. Febrile to 102.5 last night, given tylenol. Now afebrile at 97.8. Tolerating PO intake.   HD4: Patient seen and evaluated at bedside this morning. Resting comfortably in bed, in NAD. AVSS. Afebrile overnight. Tolerating PO intake.   HD5: Patient seen and evaluated at bedside this morning. Resting comfortably in bed, in NAD. NAEON. AVSS. Afebrile overnight. Mg 1.3 and K+ 3.8 this morning. 2g Mg repletion ordered.  HD6: Patient seen and evaluated at bedside this morning. Resting comfortably in bed, in NAD. NAEON. AVSS. Afebrile overnight. Electrolytes optimized for OR this morning.   HD7: POD1 s/p ORIF of mandibular fractures. Patient seen and evaluated at bedside this morning. Resting comfortably in bed, in NAD. NAEON. AVSS. Afebrile overnight. Occlusion is stable and reproducible Pending labs and hospitalist recommendations.      History of Present Illness:    51yo F transfer from [place] for OMFS care in the setting of known mandibular fracture status post fall overnight while walking to the bathroom.  Patient was in her usual state of health prior to this fall with no prodromal headache chest pain lightheadedness or shortness of breath.  Patient with jaw pain.  No headache neck pain back pain or joint pains.  Vision intact no nausea or vomiting    Pt had CT head showing Multiple fractures noted of the mandible, which appear to be nondisplaced. This includes a full-thickness fracture in the mental region of the mandible, in addition to nondisplaced fractures of both   proximal mandibular condyles. Both condylar heads remain within the TMJ. Pt underwent ORIF of fracture of mandible with wiring of denture on 2/7. tolerating liquid diet post-op, pain controlled. She was started on prophylasix abx Unasyn post op, transitioned to augmentin on discharge to complete total 5 day course. Pt was discharge home in stable condition with plan to follow OMFS outpt History of Present Illness:    53yo F transfer from [place] for OMFS care in the setting of known mandibular fracture status post fall overnight while walking to the bathroom.  Patient was in her usual state of health prior to this fall with no prodromal headache chest pain lightheadedness or shortness of breath.  Patient with jaw pain.  No headache neck pain back pain or joint pains.  Vision intact no nausea or vomiting    Pt had CT head showing Multiple fractures noted of the mandible, which appear to be nondisplaced. This includes a full-thickness fracture in the mental region of the mandible, in addition to nondisplaced fractures of both   proximal mandibular condyles. Both condylar heads remain within the TMJ. Pt underwent ORIF of fracture of mandible with wiring of denture on 2/7. tolerating liquid diet post-op, pain controlled. She was started on prophylasix abx Unasyn post op, transitioned to augmentin on discharge to complete total 7 day course. Pt was discharge home in stable condition with plan to follow OMFS outpt

## 2023-02-08 NOTE — DISCHARGE NOTE PROVIDER - CARE PROVIDERS DIRECT ADDRESSES
,DirectAddress_Unknown ,DirectAddress_Unknown,[email or web address]

## 2023-02-08 NOTE — PHYSICAL THERAPY INITIAL EVALUATION ADULT - DID THE PATIENT HAVE SURGERY?
Open reduction and internal fixation (ORIF) of fracture of mandible with wiring of denture; ORIF of Symphysic fracture + Closed reduction of Bilateral condylar fractures + Repair of facial laceration/yes

## 2023-02-08 NOTE — DISCHARGE NOTE PROVIDER - NSDCCPCAREPLAN_GEN_ALL_CORE_FT
PRINCIPAL DISCHARGE DIAGNOSIS  Diagnosis: Jaw fracture  Assessment and Plan of Treatment:        PRINCIPAL DISCHARGE DIAGNOSIS  Diagnosis: Jaw fracture  Assessment and Plan of Treatment: shown on CT. s/p ORIF. f/u OMFS outpt. pain control. advance diet as tolerated      SECONDARY DISCHARGE DIAGNOSES  Diagnosis: Major depression  Assessment and Plan of Treatment: continue home medications. f/u psychiatry in clinic

## 2023-02-08 NOTE — PHYSICAL THERAPY INITIAL EVALUATION ADULT - PERTINENT HX OF CURRENT PROBLEM, REHAB EVAL
Pt is a 52 year old female presenting as a transfer from [place] after experiencing a fall at home. Pt with a pre-operative diagnosis of multiple open mandibular fractures. Pt POD#1 ORIF of mandibular symphysis and bilateral condylar fractures via ORIF of symphysis fracture and modified closed reduction of bilateral subcondylar fractures via mandibular arch bar placement, maxillary IMF screws and elastics. PMH significant for substance use disorder, further PMH listed below.

## 2023-02-08 NOTE — PROGRESS NOTE ADULT - ASSESSMENT
52 y.o. F w/ a hx of anxiety, anorexia, substance use d/o who is transferred from [facility] for mandibular fracture now s/p ORIF of mandibular fracture 2/8, course c/b hypoxia.     # Mandibular fracture   - Sustained after mechanical fall   - S/p ORIF of mandibular fracture 2/7  - Pain control: Tylenol, Dilaudid, Oxycodone PRN   - Bowel regimen     # Hypoxia   - New O2 requirement- weaned down to 3L and SpO2 in low 90's- unclear etiology.   [ ] CXR to further evaluate     # Fever   - Spiked fever to 101.2 on 2/4, has been afebrile since then. No s/s of infection.   - u/a, CXR, negative   - Patient on empiric Unasyn per OMFS- Can consider discontinuing since no s/s of infection noted.     # Anemia  - Hbg 14 on admission, then 11-12 now down to 9's No acute bleeding noted. May be dilutional 2/2 IVF.   - Would transfuse if Hbg less than 7  - Iron studies, LDH, haptoglobin, hepatic function wnl, no obvious s/s of bleeding noted.   - Ctm CBC daily     # Anorexia   - w/ hypophosphatemia and hypomagnesemia now normalized   - Monitor electrolytes daily     # Hx of substance use d/o   - Reports hx of benzo and etoh abuse. Now drinks Etoh socially and uses Valium as prescribed by her Psychiatrist   - Cont Valium per [facility] reccs     # MDD   - Cont Trazafone, Prozac    52 y.o. F w/ a hx of anxiety, anorexia, substance use d/o who is transferred from [facility] for mandibular fracture now s/p ORIF of mandibular fracture 2/8, course c/b hypoxia.     # Mandibular fracture   - Sustained after mechanical fall   - S/p ORIF of mandibular fracture 2/7  - Pain control: Tylenol, Dilaudid, Oxycodone PRN   - Bowel regimen     # Hypoxia   - New O2 requirement- weaned down to 3L and SpO2 in low 90's- unclear etiology.   [ ] CXR to further evaluate     # Fever   - Spiked fever to 101.2 on 2/4, has been afebrile since then. No s/s of infection.   - u/a, CXR, negative   - Patient on empiric Unasyn per OMFS- Can consider discontinuing since no s/s of infection noted.     # Anemia  - Hbg 14 on admission, then 11-12 now down to 9's No acute bleeding noted. May be dilutional 2/2 IVF.   - Would transfuse if Hbg less than 7  - Iron studies, LDH, haptoglobin, hepatic function wnl, no obvious s/s of bleeding noted.   - Ctm CBC daily     # Anorexia   - w/ hypophosphatemia and hypomagnesemia now normalized   - Monitor electrolytes daily     # Hx of substance use d/o   - Reports hx of benzo and etoh abuse. Now drinks Etoh socially and uses Valium as prescribed by her Psychiatrist   - Cont Valium per [facility] reccs     # MDD   - Cont Trazafone, Prozac     Would hold discharge pending workup of hypoxia    52 y.o. F w/ a hx of anxiety, anorexia, substance use d/o who is transferred from [facility] for mandibular fracture now s/p ORIF of mandibular fracture 2/8, course c/b hypoxia.     # Mandibular fracture   - Sustained after mechanical fall   - S/p ORIF of mandibular fracture 2/7  - Pain control: Tylenol, Dilaudid, Oxycodone PRN   - Bowel regimen     # Hypoxia   - New O2 requirement- weaned down to 3L and SpO2 in low 90's   - CXR: Layering bilateral pleural effusions, new   - Stop IVF   - Lasix IV 20   [ ] Check TTE, none in system   [ ] Add on pro-BNP     # Fever   - Spiked fever to 101.2 on 2/4, has been afebrile since then. No s/s of infection.   - u/a, CXR, negative   - Patient on empiric Unasyn per OMFS- Can consider discontinuing since no s/s of infection noted.     # Anemia  - Hbg 14 on admission, then 11-12 now down to 9's No acute bleeding noted. May be dilutional 2/2 IVF.   - Would transfuse if Hbg less than 7  - Iron studies, LDH, haptoglobin, hepatic function wnl, no obvious s/s of bleeding noted.   - Ctm CBC daily     # Anorexia   - w/ hypophosphatemia and hypomagnesemia now normalized   - Monitor electrolytes daily     # Hx of substance use d/o   - Reports hx of benzo and etoh abuse. Now drinks Etoh socially and uses Valium as prescribed by her Psychiatrist   - Cont Valium per [facility] reccs though would switch to PO     # MDD   - Cont Trazafone, Prozac   - Would switch Ambien to PRN, patient appears lethargic    Would hold discharge pending improvement in hypoxia.    52 y.o. F w/ a hx of anxiety, anorexia, substance use d/o who is transferred from [facility] for mandibular fracture now s/p ORIF of mandibular fracture 2/8, course c/b hypoxia.     # Mandibular fracture   - Sustained after mechanical fall   - S/p ORIF of mandibular fracture 2/7  - Pain control: Tylenol, Dilaudid, Oxycodone PRN   - Bowel regimen     # Hypoxia   - New O2 requirement- weaned down to 3L and SpO2 in low 90's   - CXR: Layering bilateral pleural effusions, new   - C/f new onset HF, maybe 2/2 anorexia?   - Stop IVF   - Lasix IV 20   [ ] Check TTE, none in system   [ ] Add on pro-BNP     # Fever   - Spiked fever to 101.2 on 2/4, has been afebrile since then. No s/s of infection.   - u/a, CXR, negative   - Patient on empiric Unasyn per OMFS- Would discontinue since no s/s of infection noted.     # Anemia  - Hbg 14 on admission, then 11-12 now down to 9's No acute bleeding noted. May be dilutional 2/2 IVF.   - Would transfuse if Hbg less than 7  - Iron studies, LDH, haptoglobin, hepatic function wnl, no obvious s/s of bleeding noted.   - Ctm CBC daily     # Anorexia   - w/ hypophosphatemia and hypomagnesemia now normalized   - Monitor electrolytes daily     # Hx of substance use d/o   - Reports hx of benzo and etoh abuse. Now drinks Etoh socially and uses Valium as prescribed by her Psychiatrist   - Cont Valium per [facility] reccs though would switch to PO. If remains lethargic, consider tapering down slowly.      # MDD   - Cont Trazadone, Prozac   - Would switch Ambien to PRN, patient appears lethargic    Would hold discharge pending improvement in hypoxia.

## 2023-02-08 NOTE — DISCHARGE NOTE PROVIDER - NSDCACTIVITY_GEN_ALL_CORE
No heavy lifting/straining/Follow Instructions Provided by your Surgical Team No restrictions/No heavy lifting/straining/Follow Instructions Provided by your Surgical Team

## 2023-02-08 NOTE — DISCHARGE NOTE PROVIDER - PROVIDER TOKENS
PROVIDER:[TOKEN:[[id number]],FOLLOWUP:[1 week]] PROVIDER:[TOKEN:[[id number]],FOLLOWUP:[1 week]],PROVIDER:[TOKEN:[[id number]]]

## 2023-02-08 NOTE — PHYSICAL THERAPY INITIAL EVALUATION ADULT - GAIT DISTANCE, PT EVAL
25 feet 15 feet hand held assist + 15 feet with use of rolling walker (no significant improvement with use of device)

## 2023-02-08 NOTE — PHYSICAL THERAPY INITIAL EVALUATION ADULT - GENERAL OBSERVATIONS, REHAB EVAL
Pt received semisupine in bed, +IV, +pulse ox, in NAD. Spouse present at bedside. Pt agreeable to participate in PT evaluation. Pt left semisupine in bed, all lines intact and RN aware.

## 2023-02-08 NOTE — PHYSICAL THERAPY INITIAL EVALUATION ADULT - PATIENT PROFILE REVIEW, REHAB EVAL
PT evaluate and treat orders received: Ambulate as tolerated. Consult with JOCELINE FLYNN, pt may participate in PT evaluation./yes

## 2023-02-09 LAB
ANION GAP SERPL CALC-SCNC: 5 MMOL/L — LOW (ref 7–14)
BUN SERPL-MCNC: 3 MG/DL — LOW (ref 7–23)
CALCIUM SERPL-MCNC: 8.4 MG/DL — SIGNIFICANT CHANGE UP (ref 8.4–10.5)
CHLORIDE SERPL-SCNC: 95 MMOL/L — LOW (ref 98–107)
CO2 SERPL-SCNC: 41 MMOL/L — HIGH (ref 22–31)
CREAT SERPL-MCNC: 0.65 MG/DL — SIGNIFICANT CHANGE UP (ref 0.5–1.3)
EGFR: 106 ML/MIN/1.73M2 — SIGNIFICANT CHANGE UP
GLUCOSE SERPL-MCNC: 87 MG/DL — SIGNIFICANT CHANGE UP (ref 70–99)
HCT VFR BLD CALC: 28.8 % — LOW (ref 34.5–45)
HGB BLD-MCNC: 9.6 G/DL — LOW (ref 11.5–15.5)
MAGNESIUM SERPL-MCNC: 1.6 MG/DL — SIGNIFICANT CHANGE UP (ref 1.6–2.6)
MCHC RBC-ENTMCNC: 31.4 PG — SIGNIFICANT CHANGE UP (ref 27–34)
MCHC RBC-ENTMCNC: 33.3 GM/DL — SIGNIFICANT CHANGE UP (ref 32–36)
MCV RBC AUTO: 94.1 FL — SIGNIFICANT CHANGE UP (ref 80–100)
NRBC # BLD: 0 /100 WBCS — SIGNIFICANT CHANGE UP (ref 0–0)
NRBC # FLD: 0 K/UL — SIGNIFICANT CHANGE UP (ref 0–0)
NT-PROBNP SERPL-SCNC: 1282 PG/ML — HIGH
PHOSPHATE SERPL-MCNC: 3 MG/DL — SIGNIFICANT CHANGE UP (ref 2.5–4.5)
PLATELET # BLD AUTO: 159 K/UL — SIGNIFICANT CHANGE UP (ref 150–400)
POTASSIUM SERPL-MCNC: 3.2 MMOL/L — LOW (ref 3.5–5.3)
POTASSIUM SERPL-SCNC: 3.2 MMOL/L — LOW (ref 3.5–5.3)
RBC # BLD: 3.06 M/UL — LOW (ref 3.8–5.2)
RBC # FLD: 11.7 % — SIGNIFICANT CHANGE UP (ref 10.3–14.5)
SODIUM SERPL-SCNC: 141 MMOL/L — SIGNIFICANT CHANGE UP (ref 135–145)
WBC # BLD: 4.54 K/UL — SIGNIFICANT CHANGE UP (ref 3.8–10.5)
WBC # FLD AUTO: 4.54 K/UL — SIGNIFICANT CHANGE UP (ref 3.8–10.5)

## 2023-02-09 PROCEDURE — 99232 SBSQ HOSP IP/OBS MODERATE 35: CPT

## 2023-02-09 PROCEDURE — 99222 1ST HOSP IP/OBS MODERATE 55: CPT

## 2023-02-09 RX ORDER — DIAZEPAM 5 MG
10 TABLET ORAL
Refills: 0 | Status: DISCONTINUED | OUTPATIENT
Start: 2023-02-09 | End: 2023-02-10

## 2023-02-09 RX ORDER — POTASSIUM CHLORIDE 20 MEQ
20 PACKET (EA) ORAL ONCE
Refills: 0 | Status: DISCONTINUED | OUTPATIENT
Start: 2023-02-09 | End: 2023-02-10

## 2023-02-09 RX ORDER — DIAZEPAM 5 MG
20 TABLET ORAL
Refills: 0 | Status: DISCONTINUED | OUTPATIENT
Start: 2023-02-09 | End: 2023-02-10

## 2023-02-09 RX ADMIN — Medication 40 MILLIGRAM(S): at 12:31

## 2023-02-09 RX ADMIN — HYDROMORPHONE HYDROCHLORIDE 0.5 MILLIGRAM(S): 2 INJECTION INTRAMUSCULAR; INTRAVENOUS; SUBCUTANEOUS at 02:53

## 2023-02-09 RX ADMIN — Medication 650 MILLIGRAM(S): at 06:30

## 2023-02-09 RX ADMIN — Medication 20 MILLIGRAM(S): at 06:29

## 2023-02-09 RX ADMIN — ENOXAPARIN SODIUM 40 MILLIGRAM(S): 100 INJECTION SUBCUTANEOUS at 15:03

## 2023-02-09 RX ADMIN — CHLORHEXIDINE GLUCONATE 15 MILLILITER(S): 213 SOLUTION TOPICAL at 18:41

## 2023-02-09 RX ADMIN — Medication 650 MILLIGRAM(S): at 02:53

## 2023-02-09 RX ADMIN — Medication 650 MILLIGRAM(S): at 12:30

## 2023-02-09 RX ADMIN — HYDROMORPHONE HYDROCHLORIDE 0.5 MILLIGRAM(S): 2 INJECTION INTRAMUSCULAR; INTRAVENOUS; SUBCUTANEOUS at 06:29

## 2023-02-09 RX ADMIN — Medication 10 MILLIGRAM(S): at 15:02

## 2023-02-09 RX ADMIN — AMPICILLIN SODIUM AND SULBACTAM SODIUM 200 GRAM(S): 250; 125 INJECTION, POWDER, FOR SUSPENSION INTRAMUSCULAR; INTRAVENOUS at 12:32

## 2023-02-09 RX ADMIN — Medication 1 PATCH: at 06:58

## 2023-02-09 RX ADMIN — HYDROMORPHONE HYDROCHLORIDE 0.5 MILLIGRAM(S): 2 INJECTION INTRAMUSCULAR; INTRAVENOUS; SUBCUTANEOUS at 12:37

## 2023-02-09 RX ADMIN — CHLORHEXIDINE GLUCONATE 15 MILLILITER(S): 213 SOLUTION TOPICAL at 06:59

## 2023-02-09 RX ADMIN — Medication 650 MILLIGRAM(S): at 18:41

## 2023-02-09 RX ADMIN — HYDROMORPHONE HYDROCHLORIDE 0.5 MILLIGRAM(S): 2 INJECTION INTRAMUSCULAR; INTRAVENOUS; SUBCUTANEOUS at 00:11

## 2023-02-09 RX ADMIN — HYDROMORPHONE HYDROCHLORIDE 0.5 MILLIGRAM(S): 2 INJECTION INTRAMUSCULAR; INTRAVENOUS; SUBCUTANEOUS at 06:59

## 2023-02-09 RX ADMIN — AMPICILLIN SODIUM AND SULBACTAM SODIUM 200 GRAM(S): 250; 125 INJECTION, POWDER, FOR SUSPENSION INTRAMUSCULAR; INTRAVENOUS at 06:29

## 2023-02-09 RX ADMIN — AMPICILLIN SODIUM AND SULBACTAM SODIUM 200 GRAM(S): 250; 125 INJECTION, POWDER, FOR SUSPENSION INTRAMUSCULAR; INTRAVENOUS at 18:42

## 2023-02-09 RX ADMIN — HYDROMORPHONE HYDROCHLORIDE 0.5 MILLIGRAM(S): 2 INJECTION INTRAMUSCULAR; INTRAVENOUS; SUBCUTANEOUS at 18:42

## 2023-02-09 NOTE — CONSULT NOTE ADULT - SUBJECTIVE AND OBJECTIVE BOX
Patient is a 52y old  Female who presents with a chief complaint of mandibular fracture (09 Feb 2023 07:43)      HPI:   53yo F transfer from [place] for [facility] care in the setting of known mandibular fracture status post fall overnight while walking to the bathroom.  Patient was in her usual state of health prior to this fall with no prodromal headache chest pain lightheadedness or shortness of breath.  Patient with jaw pain.  No headache neck pain back pain or joint pains.  Vision intact no nausea or vomiting (02 Feb 2023 21:05)      REVIEW OF SYSTEMS  weakness      PAST MEDICAL & SURGICAL HISTORY  Anxiety    Endometriosis determined by laparoscopy    Menopause praecox    Strabismus    GERD (gastroesophageal reflux disease)    Diverticulitis    Osteoporosis    Lymphadenopathy    H/O alopecia    H/O heartburn    History of diverticulitis    Closed Colles fracture of left radius with malunion, subsequent encounter    No significant past surgical history    Endometriosis determined by laparoscopy    History of strabismus surgery    S/P colon resection    S/P appendectomy        SOCIAL HISTORY  Smoking - Denied  EtOH - Denied   Drugs - Denied    FUNCTIONAL HISTORY  Lives in private house with 2 steps outside, 1 flight inside to bedroom, can stay on main level if needed  Independent at baseline    CURRENT FUNCTIONAL STATUS  2/8  Range of Motion Exam:   · Range of Motion Examination	bilateral upper extremity ROM was WFL (within functional limits); bilateral lower extremity ROM was WFL (within functional limits)    Manual Muscle Testing:   · Manual Muscle Testing Results	Bilateral UE/LE grossly 3+/5 throughout.    Bed Mobility: Sit to Supine:     · Level of [place]	minimum assist (75% patients effort)  · Physical Assist/Nonphysical Assist	1 person assist; verbal cues    Bed Mobility: Supine to Sit:     · Level of [place]	contact guard  · Physical Assist/Nonphysical Assist	1 person assist; verbal cues  · Assistive Device	bed rails    Bed Mobility Analysis:     · Impairments Contributing to Impaired Bed Mobility	decreased strength; impaired balance    Transfer: Sit to Stand:     · Level of [place]	minimum assist (75% patients effort)  · Physical Assist/Nonphysical Assist	1 person assist; verbal cues  · Weight-Bearing Restrictions	full weight-bearing  · Assistive Device	hand held assist    Transfer: Stand to Sit:     · Level of [place]	minimum assist (75% patients effort)  · Physical Assist/Nonphysical Assist	1 person assist; verbal cues  · Weight-Bearing Restrictions	full weight-bearing  · Assistive Device	hand held assist    Sit/Stand Transfer Safety Analysis:     · Impairments Contributing to Impaired Transfers	decreased strength; impaired balance    Gait Skills:     · Level of [place]	minimum assist (75% patients effort)  · Physical Assist/Nonphysical Assist	1 person assist; verbal cues  · Weight-Bearing Restrictions	full weight-bearing   	  · Gait Distance	15 feet hand held assist + 15 feet with use of rolling walker (no significant improvement with use of device)  	    Gait Analysis:     · Gait Pattern Used	swing-through gait  · Gait Deviations Noted	decreased davis; narrow base of support; + scissoring gait  · Impairments Contributing to Gait Deviations	decreased strength; impaired balance; ataxic      FAMILY HISTORY   Family history of heart disease      RECENT LABS/IMAGING  CBC Full  -  ( 09 Feb 2023 03:58 )  WBC Count : 4.54 K/uL  RBC Count : 3.06 M/uL  Hemoglobin : 9.6 g/dL  Hematocrit : 28.8 %  Platelet Count - Automated : 159 K/uL  Mean Cell Volume : 94.1 fL  Mean Cell Hemoglobin : 31.4 pg  Mean Cell Hemoglobin Concentration : 33.3 gm/dL  Auto Neutrophil # : x  Auto Lymphocyte # : x  Auto Monocyte # : x  Auto Eosinophil # : x  Auto Basophil # : x  Auto Neutrophil % : x  Auto Lymphocyte % : x  Auto Monocyte % : x  Auto Eosinophil % : x  Auto Basophil % : x    02-09    141  |  95<L>  |  3<L>  ----------------------------<  87  3.2<L>   |  41<H>  |  0.65    Ca    8.4      09 Feb 2023 03:58  Phos  3.0     02-09  Mg     1.60     02-09    TPro  5.5<L>  /  Alb  3.2<L>  /  TBili  0.2  /  DBili  <0.2  /  AST  14  /  ALT  8   /  AlkPhos  43  02-08        VITALS  T(C): 36.4 (02-09-23 @ 10:00), Max: 37.2 (02-09-23 @ 06:00)  HR: 113 (02-09-23 @ 10:00) (68 - 113)  BP: 98/67 (02-09-23 @ 10:00) (96/62 - 133/88)  RR: 17 (02-09-23 @ 10:00) (17 - 18)  SpO2: 89% (02-09-23 @ 10:00) (89% - 99%)  Wt(kg): --    ALLERGIES  predniSONE (Unknown)  Tree Nuts (Hives; Other)      MEDICATIONS   acetaminophen    Suspension .. 650 milliGRAM(s) Oral every 6 hours  ampicillin/sulbactam  IVPB 3 Gram(s) IV Intermittent every 6 hours  ampicillin/sulbactam  IVPB      chlorhexidine 0.12% Liquid 15 milliLiter(s) Oral Mucosa two times a day  diazepam    Tablet 20 milliGRAM(s) Oral <User Schedule>  diazepam    Tablet 10 milliGRAM(s) Oral <User Schedule>  enoxaparin Injectable 40 milliGRAM(s) SubCutaneous every 24 hours  FLUoxetine 40 milliGRAM(s) Oral daily  HYDROmorphone  Injectable 0.5 milliGRAM(s) IV Push every 6 hours PRN  ibuprofen  Suspension. 600 milliGRAM(s) Oral every 6 hours  melatonin 3 milliGRAM(s) Oral at bedtime  nicotine -  14 mG/24Hr(s) Patch 1 Patch Transdermal daily  oxyCODONE    Solution 5 milliGRAM(s) Oral every 4 hours PRN  polyethylene glycol 3350 17 Gram(s) Oral at bedtime  potassium chloride    Tablet ER 20 milliEquivalent(s) Oral once  senna 2 Tablet(s) Oral at bedtime  traZODone 100 milliGRAM(s) Oral at bedtime  zolpidem 10 milliGRAM(s) Oral at bedtime PRN      ----------------------------------------------------------------------------------------  PHYSICAL EXAM  Constitutional - NAD, Comfortable  HEENT - NCAT, EOMI  Neck - Supple, No limited ROM  Chest - CTA bilaterally, No wheeze, No rhonchi, No crackles  Cardiovascular - RRR, S1S2, No murmurs  Abdomen - BS+, Soft, NTND  Extremities - No C/C/E, No calf tenderness   Neurologic Exam -                    Cognitive - Awake, Alert, AAO to self, place, date, year, situation     Communication - Fluent, No dysarthria     Cranial Nerves - CN 2-12 intact     Motor - No focal deficits                    LEFT    UE - ShAB 5/5, EF 5/5, EE 5/5, WE 5/5,  5/5                    RIGHT UE - ShAB 5/5, EF 5/5, EE 5/5, WE 5/5,  5/5                    LEFT    LE - HF 5/5, KE 5/5, DF 5/5, PF 5/5                    RIGHT LE - HF 5/5, KE 5/5, DF 5/5, PF 5/5        Sensory - Intact to LT     Reflexes - DTR Intact, No primitive reflexive     Coordination - FTN intact     OculoVestibular - No saccades, No nystagmus, VOR         Balance - WNL Static  Psychiatric - Mood stable, Affect WNL  ----------------------------------------------------------------------------------------  ASSESSMENT/PLAN    Pain -  DVT PPX -   Rehab -       incomplete note, consult in progress Patient is a 52y old  Female who presents with a chief complaint of mandibular fracture (09 Feb 2023 07:43)      HPI:   53yo F transfer from [place] [facility] [place] in the setting of known mandibular fracture status post fall overnight while walking to the bathroom.  Patient was in her usual state of health prior to this fall with no prodromal headache chest pain lightheadedness or shortness of breath.  Patient with jaw pain.  No headache neck pain back pain or joint pains.  Vision intact no nausea or vomiting (02 Feb 2023 21:05)    s/p ORIF mandibular symphasis and b/l condylar fractures via ORIF of symphasis fracture and modified closed reduction of b/l subcondylar fractures, maxillary IMF screws and elastics    REVIEW OF SYSTEMS  weakness      PAST MEDICAL & SURGICAL HISTORY  Anxiety    Endometriosis determined by laparoscopy    Menopause praecox    Strabismus    GERD (gastroesophageal reflux disease)    Diverticulitis    Osteoporosis    Lymphadenopathy    H/O alopecia    H/O heartburn    History of diverticulitis    Closed Colles fracture of left radius with malunion, subsequent encounter    No significant past surgical history    Endometriosis determined by laparoscopy    History of strabismus surgery    S/P colon resection    S/P appendectomy        SOCIAL HISTORY  Smoking - former smoker  EtOH - Denied   Drugs - Denied    FUNCTIONAL HISTORY  Lives in private house with 2 steps outside, 1 flight inside to bedroom, can stay on main level if needed  Independent at baseline    CURRENT FUNCTIONAL STATUS  2/8  Range of Motion Exam:   · Range of Motion Examination	bilateral upper extremity ROM was WFL (within functional limits); bilateral lower extremity ROM was WFL (within functional limits)    Manual Muscle Testing:   · Manual Muscle Testing Results	Bilateral UE/LE grossly 3+/5 throughout.    Bed Mobility: Sit to Supine:     · Level of [place]	minimum assist (75% patients effort)  · Physical Assist/Nonphysical Assist	1 person assist; verbal cues    Bed Mobility: Supine to Sit:     · Level of [place]	contact guard  · Physical Assist/Nonphysical Assist	1 person assist; verbal cues  · Assistive Device	bed rails    Bed Mobility Analysis:     · Impairments Contributing to Impaired Bed Mobility	decreased strength; impaired balance    Transfer: Sit to Stand:     · Level of [place]	minimum assist (75% patients effort)  · Physical Assist/Nonphysical Assist	1 person assist; verbal cues  · Weight-Bearing Restrictions	full weight-bearing  · Assistive Device	hand held assist    Transfer: Stand to Sit:     · Level of [place]	minimum assist (75% patients effort)  · Physical Assist/Nonphysical Assist	1 person assist; verbal cues  · Weight-Bearing Restrictions	full weight-bearing  · Assistive Device	hand held assist    Sit/Stand Transfer Safety Analysis:     · Impairments Contributing to Impaired Transfers	decreased strength; impaired balance    Gait Skills:     · Level of [place]	minimum assist (75% patients effort)  · Physical Assist/Nonphysical Assist	1 person assist; verbal cues  · Weight-Bearing Restrictions	full weight-bearing   	  · Gait Distance	15 feet hand held assist + 15 feet with use of rolling walker (no significant improvement with use of device)  	    Gait Analysis:     · Gait Pattern Used	swing-through gait  · Gait Deviations Noted	decreased davis; narrow base of support; + scissoring gait  · Impairments Contributing to Gait Deviations	decreased strength; impaired balance; ataxic      FAMILY HISTORY   Family history of heart disease      RECENT LABS/IMAGING  CBC Full  -  ( 09 Feb 2023 03:58 )  WBC Count : 4.54 K/uL  RBC Count : 3.06 M/uL  Hemoglobin : 9.6 g/dL  Hematocrit : 28.8 %  Platelet Count - Automated : 159 K/uL  Mean Cell Volume : 94.1 fL  Mean Cell Hemoglobin : 31.4 pg  Mean Cell Hemoglobin Concentration : 33.3 gm/dL  Auto Neutrophil # : x  Auto Lymphocyte # : x  Auto Monocyte # : x  Auto Eosinophil # : x  Auto Basophil # : x  Auto Neutrophil % : x  Auto Lymphocyte % : x  Auto Monocyte % : x  Auto Eosinophil % : x  Auto Basophil % : x    02-09    141  |  95<L>  |  3<L>  ----------------------------<  87  3.2<L>   |  41<H>  |  0.65    Ca    8.4      09 Feb 2023 03:58  Phos  3.0     02-09  Mg     1.60     02-09    TPro  5.5<L>  /  Alb  3.2<L>  /  TBili  0.2  /  DBili  <0.2  /  AST  14  /  ALT  8   /  AlkPhos  43  02-08        VITALS  T(C): 36.4 (02-09-23 @ 10:00), Max: 37.2 (02-09-23 @ 06:00)  HR: 113 (02-09-23 @ 10:00) (68 - 113)  BP: 98/67 (02-09-23 @ 10:00) (96/62 - 133/88)  RR: 17 (02-09-23 @ 10:00) (17 - 18)  SpO2: 89% (02-09-23 @ 10:00) (89% - 99%)  Wt(kg): --    ALLERGIES  predniSONE (Unknown)  Tree Nuts (Hives; Other)      MEDICATIONS   acetaminophen    Suspension .. 650 milliGRAM(s) Oral every 6 hours  ampicillin/sulbactam  IVPB 3 Gram(s) IV Intermittent every 6 hours  ampicillin/sulbactam  IVPB      chlorhexidine 0.12% Liquid 15 milliLiter(s) Oral Mucosa two times a day  diazepam    Tablet 20 milliGRAM(s) Oral <User Schedule>  diazepam    Tablet 10 milliGRAM(s) Oral <User Schedule>  enoxaparin Injectable 40 milliGRAM(s) SubCutaneous every 24 hours  FLUoxetine 40 milliGRAM(s) Oral daily  HYDROmorphone  Injectable 0.5 milliGRAM(s) IV Push every 6 hours PRN  ibuprofen  Suspension. 600 milliGRAM(s) Oral every 6 hours  melatonin 3 milliGRAM(s) Oral at bedtime  nicotine -  14 mG/24Hr(s) Patch 1 Patch Transdermal daily  oxyCODONE    Solution 5 milliGRAM(s) Oral every 4 hours PRN  polyethylene glycol 3350 17 Gram(s) Oral at bedtime  potassium chloride    Tablet ER 20 milliEquivalent(s) Oral once  senna 2 Tablet(s) Oral at bedtime  traZODone 100 milliGRAM(s) Oral at bedtime  zolpidem 10 milliGRAM(s) Oral at bedtime PRN      ----------------------------------------------------------------------------------------  PHYSICAL EXAM  Constitutional - NAD  HEENT - NCAT   Chest - no respiratory distress  Cardiovascular - RRR, S1S2   Abdomen -Soft, NTND  Extremities - No C/C/E, No calf tenderness   Neurologic Exam -                    Cognitive - Awake, Alert, AAO to self, place, date, year, situation     Communication - Fluent, No dysarthria     Cranial Nerves - impaired vision, difficulty opening eyes due to facial pain     Motor - No focal deficits                    LEFT    UE - ShAB 5/5, EF 5/5, EE 5/5, WE 5/5,  5/5                    RIGHT UE - ShAB 5/5, EF 5/5, EE 5/5, WE 5/5,  5/5                    LEFT    LE - HF 5/5, KE 5/5, DF 5/5, PF 5/5                    RIGHT LE - HF 5/5, KE 5/5, DF 5/5, PF 5/5        Sensory - Intact to LT      Balance - WNL Static  Psychiatric - Mood stable, Affect WNL  ----------------------------------------------------------------------------------------  ASSESSMENT/PLAN   53 yo f p/w fall, mandibular fx s/p ORIF, reports chronic limitation in peripheral vision, also worsened by difficulty opening her eyes due to facial pain  states vision improving, better today than yesterda  states her  can assist her at home  Pain -dilaudid iv prn, oxy ir prn   ambulated with PT today with stand by assist 80' with PT today  Rehab -  recommend home with family assistance when medically cleared.  consider assistive device (cane), as well as home vs outpatient PT/OT

## 2023-02-09 NOTE — PROGRESS NOTE ADULT - PROVIDER SPECIALTY LIST ADULT
OMFS
OMFS
Hospitalist
OMFS
Hospitalist

## 2023-02-09 NOTE — PROGRESS NOTE ADULT - REASON FOR ADMISSION
mandibular fracture

## 2023-02-09 NOTE — PROGRESS NOTE ADULT - ASSESSMENT
57 yr old female s/p mechanical fall sustaining mandibular parasymphysis fracture, anterior mandibular alveolar fracture, and bilateral subcondylar fractures. Patient's fractured indicated for operable repair. Patient went to the OR yesterday for ORIF of mandibular symphasis and b/l condylar fractures via ORIF of symphasis fracture and modified closed reduction of b/l subcondylar fractures via mandibular arch bar placement, maxillary IMF screws and elastics.     Plan:  - FLD  - Continue w/ multimodal pain control   - Continue IV abx while inpatient   - Appreciate medicine recommendations     Nany Benitez DDS  [facility] [address] pager: [phone]   Available on teams 57 yr old female s/p mechanical fall sustaining mandibular parasymphysis fracture, anterior mandibular alveolar fracture, and bilateral subcondylar fractures. Patient's fractured indicated for operable repair. Patient went to the OR yesterday for ORIF of mandibular symphasis and b/l condylar fractures via ORIF of symphasis fracture and modified closed reduction of b/l subcondylar fractures via mandibular arch bar placement, maxillary IMF screws and elastics.     Patient is cleared from OMFS perspective. no further inpatient OMFS needs    Plan:  - patient is cleared for discharge from OMFS perspective.   - patient will be transferred to medicine service today  - continue unasyn   - please discharge on 5 day course of Augmentin 875 bid  - elastics taught to patient for home care  - patient has f/u appointment with OMFS 2/15 10:00 AM  - please call [phone] for any questions    - [facility]   OMFS/ Dental clinic: 1st floor  [address] [place] [address]  [phone]    Aiden Mcgill DDS  [facility] pager: [phone]   [place]: [phone]  Avaliable on teams 57 yr old female s/p mechanical fall sustaining mandibular parasymphysis fracture, anterior mandibular alveolar fracture, and bilateral subcondylar fractures. Patient's fractured indicated for operable repair. Patient went to the OR yesterday for ORIF of mandibular symphasis and b/l condylar fractures via ORIF of symphasis fracture and modified closed reduction of b/l subcondylar fractures via mandibular arch bar placement, maxillary IMF screws and elastics.     Patient is cleared from OMFS perspective. no further inpatient OMFS needs    Plan:  - patient is cleared for discharge from OMFS perspective.   - patient will be transferred to medicine service today  - patient to receive a total of 5 days of post op abx: surgery 2/7  - continue unasyn   - please discharge on augmentin 875 BID  - elastics taught to patient for home care  - patient has f/u appointment with OMFS 2/15 10:00 AM  - please call [phone] for any questions    - [facility]   OMFS/ Dental clinic: 1st floor  [address] [place] [address]  [phone]    Aiden Mcgill DDS  [facility] pager: [phone]   Home: [phone]  Avaliable on teams

## 2023-02-09 NOTE — PROGRESS NOTE ADULT - ASSESSMENT
52 y.o. F w/ a hx of anxiety, anorexia, substance use d/o who is transferred from [facility] for mandibular fracture now s/p ORIF of mandibular fracture 2/8, course c/b hypoxia.     # Mandibular fracture   - Sustained after mechanical fall   - S/p ORIF of mandibular fracture 2/7  - Pain control: Tylenol, Dilaudid, Oxycodone PRN   - Bowel regimen     # Hypoxia   resolved  - CXR: Layering bilateral pleural effusions  - s/p Lasix IV 20   - echo normal LVEF     # Fever   - Spiked fever to 101.2 on 2/4, has been afebrile since then. No s/s of infection.   - u/a, CXR, negative   - Patient on empiric Unasyn x5 days per OMFS- will change to augmenin on dc to complete course     # Anemia  - Hbg 14 on admission, then 11-12 now stable ~10. likely dilutional d/t IVF   - Would transfuse if Hbg less than 7  - Iron studies, LDH, haptoglobin, hepatic function wnl, no obvious s/s of bleeding noted.   - Ctm CBC daily     # Anorexia   - w/ hypophosphatemia and hypomagnesemia now normalized   - Monitor electrolytes daily     # Hx of substance use d/o   - Reports hx of benzo and etoh abuse. Now drinks Etoh socially and uses Valium as prescribed by her Psychiatrist   - Cont Valium per [facility] reccs though would switch to PO.    # MDD   - Cont Trazadone, Prozac   - switch Ambien to PRN    #Dispo  anticipate dc home tomorrow with home PT/outpt PT

## 2023-02-09 NOTE — PROGRESS NOTE ADULT - SUBJECTIVE AND OBJECTIVE BOX
Interval hx:  HD3: Patient seen and evaluated at bedside this morning. Resting comfortably in bed, in NAD. AVSS. Febrile to 102.5 last night, given tylenol. Now afebrile at 97.8. Tolerating PO intake.     HD2  patient visited bedside, resting comfortably. AVSS. NAEON. patient refused EKG overnight, will attempt again in morning. Patient planned for ORIF mandibular fractures today in the OR with Dr. Jordan. medicine and psych following. nutrition consulted. Patient was shivering this morning, warm blanekts given and titus hugger ordered.     HPI:   52 F transfer from [place] for FS care in the setting of known mandibular fracture status post fall overnight while walking to the bathroom.  Patient was in her usual state of health prior to this fall with no prodromal headache chest pain lightheadedness or shortness of breath.  Patient with jaw pain.  No headache neck pain back pain or joint pains.  Vision intact no nausea.       Patient is a 52y old  Female who presents with a chief complaint of mandibular fracture (03 Feb 2023 00:48)      PAST MEDICAL & SURGICAL HISTORY:  Anxiety      Endometriosis determined by laparoscopy      Strabismus      GERD (gastroesophageal reflux disease)      Diverticulitis  h/o      Osteoporosis      Lymphadenopathy      H/O alopecia      H/O heartburn      History of diverticulitis      Closed Colles&#x27; fracture of left radius with malunion, subsequent encounter      Endometriosis determined by laparoscopy  over 15 years ago      History of strabismus surgery  2x      S/P colon resection  july 2017      S/P appendectomy  july 2017        MEDICATIONS  (STANDING):  acetaminophen     Tablet .. 1000 milliGRAM(s) Oral every 6 hours  ampicillin/sulbactam  IVPB 3 Gram(s) IV Intermittent every 6 hours  ampicillin/sulbactam  IVPB      chlorhexidine 0.12% Liquid 15 milliLiter(s) Oral Mucosa two times a day  dextrose 5% + sodium chloride 0.45%. 1000 milliLiter(s) (60 mL/Hr) IV Continuous <Continuous>  diazepam    Tablet 20 milliGRAM(s) Oral <User Schedule>  diazepam    Tablet 10 milliGRAM(s) Oral <User Schedule>  FLUoxetine 40 milliGRAM(s) Oral daily  ibuprofen  Tablet. 600 milliGRAM(s) Oral every 6 hours  melatonin 3 milliGRAM(s) Oral at bedtime  traZODone 100 milliGRAM(s) Oral at bedtime  zolpidem 10 milliGRAM(s) Oral at bedtime    MEDICATIONS  (PRN):  HYDROmorphone  Injectable 0.5 milliGRAM(s) IV Push every 6 hours PRN Severe Pain (7 - 10)  oxyCODONE    IR 5 milliGRAM(s) Oral every 4 hours PRN Moderate Pain (4 - 6)    Allergies    predniSONE (Unknown)  Tree Nuts (Hives; Other)    Intolerances      FAMILY HISTORY:  Family history of heart disease  father  83      *SOCIAL HISTORY: Denies ETOH use, Tobacco, drugs    Review of systems:  denies fever, chills. denies LOC. denies nausea/vomiting/headache. denies CP, SOB, cough. Denies palpatitions. denies blurred/double vision. denies dyspahgia, dyspnea.    ICU Vital Signs Last 24 Hrs  T(C): 36.6 (04 Feb 2023 06:50), Max: 39.2 (04 Feb 2023 02:10)  T(F): 97.8 (04 Feb 2023 06:50), Max: 102.5 (04 Feb 2023 02:10)  HR: 82 (04 Feb 2023 02:10) (79 - 87)  BP: 109/79 (04 Feb 2023 06:50) (85/56 - 121/81)  BP(mean): --  ABP: --  ABP(mean): --  RR: 20 (04 Feb 2023 06:50) (17 - 20)  SpO2: 99% (04 Feb 2023 06:50) (96% - 99%)    O2 Parameters below as of 04 Feb 2023 06:50  Patient On (Oxygen Delivery Method): room air        I&O's Detail    03 Feb 2023 07:01  -  04 Feb 2023 07:00  --------------------------------------------------------  IN:    dextrose 5% + sodium chloride 0.45%: 1320 mL    IV PiggyBack: 800 mL    Oral Fluid: 820 mL  Total IN: 2940 mL    OUT:  Total OUT: 0 mL    Total NET: 2940 mL          PHYSICAL EXAM:    CONSTITUTIONAL: Well groomed, no apparent distress    EYES: PERRL and symmetric, EOMI, No conjunctival or scleral injection, non-icteric    ENMT:   Head: left chin laceration to mandible, laceration currently hemostatic  Eyes: EOMI, PERRL, visual acuity intact, no diplopia,  supra/infra orbital rims intact, no subconjunctival heme, no telecanthus, no exophthalmos  Ears: Gross hearing intact, No heme appreciated,   Nose: no crepitis/septal hematoma/asymmetry.  no Lacerations/abrasions/hematomas.  Malar: no malar depression, no CN V-2 paresthesia  Throat:  LAD, supple, FROM,     Extraoral/Intraoral Exam: TORO: 20 , Dentition grossly intact, no carious dentition, occlusion non-stable and non-reproducible, lingual anterior FOM hematoma present consistent with symphysis fracture, right sided mandibular step deformity from #27 distal, no CN V-3 paresthesia, no signs of infection, anterior edema and tenderness to palpation. FOM soft, NT. no mobility of maxilla/crepitis. TMJ: bilateral subcondylar fractures can be palpated    CN II-XII intact        LABS:                          14.5   7.01  )-----------( 261      ( 02 Feb 2023 11:30 )             40.2     02-02    139  |  99  |  15  ----------------------------<  85  3.5   |  30  |  0.89    Ca    8.5      02 Feb 2023 11:30    TPro  7.3  /  Alb  4.3  /  TBili  0.4  /  DBili  x   /  AST  36  /  ALT  30  /  AlkPhos  52  02-02      PT/INR - ( 02 Feb 2023 11:30 )   PT: 13.4 sec;   INR: 1.13 ratio         PTT - ( 02 Feb 2023 11:30 )  PTT:29.4 sec            IMAGING:    NTERPRETATION:  INDICATION:  Facial trauma. Jaw pain. Status post   trauma. Laceration.  TECHNIQUE:  Thin section axial and coronal CT imaging of the facial bones   was conducted.  In addition, coronal and sagittal reformations were   generated from the axial data.  3-D imaging was performed.    FINDINGS:    ORBITS:  No orbital fracture is depicted.  Both globes appear symmetric.    No intraconal or extraconal fluid collection or phlegmon is evident. The   extraocular muscles and optic nerves are of equal size bilaterally.    FACIAL BONES:   There are multiple fractures of the mandible. There is a   vertically oriented a fracturein the mental region of the mandible,   which is full-thickness that. There is no significant displacement, but   the fracture does angle posteriorly towards the left. Also noted are   nondisplaced fractures posteriorly involving the mandibular condyles. The   other facial bones appear to be intact that. There is no visible fracture   of the maxilla.    NASAL BONES:  no fracture is identified.    PARANASAL SINUSES:  clear    IMPRESSION:  Multiple fractures noted of the mandible, which appear to be   nondisplaced. This includes a full-thickness fracture in the mental   region of the mandible, in addition to nondisplaced fractures of both   proximal mandibular condyles. Both condylar heads remain within the TMJ.
Interval hx:  HD7: POD2 s/p ORIF of parasymphysis fx, patient seen and evaluated at bedside this morning. Resting comfortably in bed, in NAD. NAEON. AVSS. Afebrile overnight. Occlusion is stable and reproducible. WBC this morning 4.54 this morning from 5.03 yesterday     HD7: POD1 Patient seen and evaluated at bedside this morning. Resting comfortably in bed, in NAD. NAEON. AVSS. Afebrile overnight. Occlusion is stable and reproducible Pending labs and hospitalist recommendations.     HD6: Patient seen and evaluated at bedside this morning. Resting comfortably in bed, in NAD. NAEON. AVSS. Afebrile overnight. Electrolytes optimized for OR this morning.     HD5: Patient seen and evaluated at bedside this morning. Resting comfortably in bed, in NAD. NAEON. AVSS. Afebrile overnight. Mg 1.3 and K+ 3.8 this morning. 2g Mg repletion ordered.    HD4: Patient seen and evaluated at bedside this morning. Resting comfortably in bed, in NAD. AVSS. Afebrile overnight. Tolerating PO intake.     HD3: Patient seen and evaluated at bedside this morning. Resting comfortably in bed, in NAD. AVSS. Febrile to 102.5 last night, given tylenol. Now afebrile at 97.8. Tolerating PO intake.     HD2  patient visited bedside, resting comfortably. AVSS. NAEON. patient refused EKG overnight, will attempt again in morning. Patient planned for ORIF mandibular fractures today in the OR with Dr. Jordan. medicine and psych following. nutrition consulted. Patient was shivering this morning, warm blanekts given and titus hugger ordered.     HPI:   52 F transfer from [place] for OMFS care in the setting of known mandibular fracture status post fall overnight while walking to the bathroom.  Patient was in her usual state of health prior to this fall with no prodromal headache chest pain lightheadedness or shortness of breath.  Patient with jaw pain.  No headache neck pain back pain or joint pains.  Vision intact no nausea.     Patient is a 52y old  Female who presents with a chief complaint of mandibular fracture (03 Feb 2023 00:48)    PAST MEDICAL & SURGICAL HISTORY:  Anxiety  Endometriosis determined by laparoscopy  Strabismus  GERD (gastroesophageal reflux disease)  Diverticulitis  Osteoporosis  Lymphadenopathy  Alopecia  Diverticulitis    PSH:  Closed Colles&#x27; fracture of left radius with malunion, subsequent encounter  Endometriosis determined by laparoscopyover 15 years ago  History of strabismus surgery X2  Colon resection July 2017  Appendectomy july 2017    MEDICATIONS  (STANDING):  acetaminophen     Tablet .. 1000 milliGRAM(s) Oral every 6 hours  ampicillin/sulbactam  IVPB 3 Gram(s) IV Intermittent every 6 hours  ampicillin/sulbactam  IVPB      chlorhexidine 0.12% Liquid 15 milliLiter(s) Oral Mucosa two times a day  dextrose 5% + sodium chloride 0.45%. 1000 milliLiter(s) (60 mL/Hr) IV Continuous <Continuous>  diazepam    Tablet 20 milliGRAM(s) Oral <User Schedule>  diazepam    Tablet 10 milliGRAM(s) Oral <User Schedule>  FLUoxetine 40 milliGRAM(s) Oral daily  ibuprofen  Tablet. 600 milliGRAM(s) Oral every 6 hours  melatonin 3 milliGRAM(s) Oral at bedtime  traZODone 100 milliGRAM(s) Oral at bedtime  zolpidem 10 milliGRAM(s) Oral at bedtime    MEDICATIONS  (PRN):  HYDROmorphone  Injectable 0.5 milliGRAM(s) IV Push every 6 hours PRN Severe Pain (7 - 10)  oxyCODONE    IR 5 milliGRAM(s) Oral every 4 hours PRN Moderate Pain (4 - 6)    Allergies  predniSONE (Unknown)  Tree Nuts (Hives; Other)    FAMILY HISTORY:  Family history of heart disease    *SOCIAL HISTORY: Denies ETOH use, Tobacco, drugs  Review of systems:  denies fever, chills. denies LOC. denies nausea/vomiting/headache. denies CP, SOB, cough. Denies palpatitions. denies blurred/double vision. denies dyspahgia, dyspnea.    PHYSICAL EXAM:  CONSTITUTIONAL: Well groomed, no apparent distress  EYES: PERRL and symmetric, EOMI, No conjunctival or scleral injection, non-icteric  ENMT:   Head: left chin laceration to mandible, laceration currently hemostatic  Eyes: EOMI, PERRL, visual acuity intact, no diplopia,  supra/infra orbital rims intact, no subconjunctival heme, no telecanthus, no exophthalmos  Ears: Gross hearing intact, No heme appreciated,   Nose: no crepitis/septal hematoma/asymmetry.  no Lacerations/abrasions/hematomas.  Malar: no malar depression, no CN V-2 paresthesia  Throat:  LAD, supple, FROM,     Extraoral/Intraoral Exam: TORO: 30 , Dentition grossly intact, no carious dentition, occlusion non-stable and non-reproducible, lingual anterior FOM hematoma present consistent with symphysis fracture, right sided mandibular step deformity, no CN V-3 paresthesia, no signs of infection, anterior edema and tenderness to palpation. FOM soft, NT. no mobility of maxilla/crepitis. TMJ: bilateral subcondylar fractures can be palpated  CN II-XII intact    LABS:  Vital Signs Last 24 Hrs  T(C): 36.5 (08 Feb 2023 02:29), Max: 36.5 (07 Feb 2023 07:30)  T(F): 97.7 (08 Feb 2023 02:29), Max: 97.7 (07 Feb 2023 07:30)  HR: 97 (08 Feb 2023 02:29) (58 - 97)  BP: 110/78 (08 Feb 2023 02:29) (84/53 - 151/103)  BP(mean): 85 (07 Feb 2023 16:00) (64 - 677)  RR: 17 (08 Feb 2023 02:29) (11 - 20)  SpO2: 96% (08 Feb 2023 02:29) (92% - 100%)    Parameters below as of 08 Feb 2023 02:29  Patient On (Oxygen Delivery Method): nasal cannula                          9.0    3.82  )-----------( 125      ( 07 Feb 2023 15:30 )             27.8   BMI: BMI (kg/m2): 16.6 (02-07-23 @ 07:42)  HbA1c:   Glucose:   BP: 110/78 (02-08-23 @ 02:29) (81/51 - 151/103)  Lipid Panel:       I&O's Summary    06 Feb 2023 07:01  -  07 Feb 2023 07:00  --------------------------------------------------------  IN: 1082 mL / OUT: 0 mL / NET: 1082 mL    07 Feb 2023 07:01  -  08 Feb 2023 06:43  --------------------------------------------------------  IN: 1270 mL / OUT: 750 mL / NET: 520 mL    IMAGING:    INTERPRETATION:  INDICATION:  Facial trauma. Jaw pain. Status post   trauma. Laceration.  TECHNIQUE:  Thin section axial and coronal CT imaging of the facial bones   was conducted.  In addition, coronal and sagittal reformations were   generated from the axial data.  3-D imaging was performed.    FINDINGS:    ORBITS:  No orbital fracture is depicted.  Both globes appear symmetric.    No intraconal or extraconal fluid collection or phlegmon is evident. The   extraocular muscles and optic nerves are of equal size bilaterally.    FACIAL BONES:   There are multiple fractures of the mandible. There is a   vertically oriented a fracturein the mental region of the mandible,   which is full-thickness that. There is no significant displacement, but   the fracture does angle posteriorly towards the left. Also noted are   nondisplaced fractures posteriorly involving the mandibular condyles. The   other facial bones appear to be intact that. There is no visible fracture   of the maxilla.    NASAL BONES:  no fracture is identified.    PARANASAL SINUSES:  clear    IMPRESSION:  Multiple fractures noted of the mandible, which appear to be   nondisplaced. This includes a full-thickness fracture in the mental   region of the mandible, in addition to nondisplaced fractures of both   proximal mandibular condyles. Both condylar heads remain within the TMJ.
Merlin Mathew, MD   Hospitalist  Pager #[phone]    PROGRESS NOTE:     Patient is a 52y old  Female who presents with a chief complaint of mandibular fracture (07 Feb 2023 07:29)      SUBJECTIVE / OVERNIGHT EVENTS: Patient seen in the PACU, sleeping   Shook head yes to pain, no to F/C, CP, SOB  Did not have any questions     ADDITIONAL REVIEW OF SYSTEMS:    MEDICATIONS  (STANDING):  acetaminophen    Suspension .. 650 milliGRAM(s) Oral every 6 hours  ampicillin/sulbactam  IVPB 3 Gram(s) IV Intermittent every 6 hours  ampicillin/sulbactam  IVPB      chlorhexidine 0.12% Liquid 15 milliLiter(s) Oral Mucosa two times a day  diazepam  Injectable 20 milliGRAM(s) IV Push <User Schedule>  diazepam  Injectable 10 milliGRAM(s) IV Push <User Schedule>  FLUoxetine 40 milliGRAM(s) Oral daily  ibuprofen  Suspension. 600 milliGRAM(s) Oral every 6 hours  lactated ringers. 1000 milliLiter(s) (40 mL/Hr) IV Continuous <Continuous>  melatonin 3 milliGRAM(s) Oral at bedtime  nicotine -  14 mG/24Hr(s) Patch 1 Patch Transdermal daily  polyethylene glycol 3350 17 Gram(s) Oral at bedtime  senna 2 Tablet(s) Oral at bedtime  traZODone 100 milliGRAM(s) Oral at bedtime  zolpidem 10 milliGRAM(s) Oral at bedtime    MEDICATIONS  (PRN):  HYDROmorphone  Injectable 0.5 milliGRAM(s) IV Push every 4 hours PRN Severe Pain (7 - 10)  HYDROmorphone  Injectable 0.5 milliGRAM(s) IV Push every 10 minutes PRN Moderate Pain (4 - 6)  ondansetron Injectable 4 milliGRAM(s) IV Push once PRN Nausea and/or Vomiting  oxyCODONE    Solution 5 milliGRAM(s) Oral every 4 hours PRN Moderate Pain (4 - 6)  oxyCODONE    Solution 10 milliGRAM(s) Oral every 4 hours PRN Severe Pain (7 - 10)      CAPILLARY BLOOD GLUCOSE        I&O's Summary    06 Feb 2023 07:01  -  07 Feb 2023 07:00  --------------------------------------------------------  IN: 1082 mL / OUT: 0 mL / NET: 1082 mL    07 Feb 2023 07:01  -  07 Feb 2023 13:58  --------------------------------------------------------  IN: 0 mL / OUT: 400 mL / NET: -400 mL        PHYSICAL EXAM:  Vital Signs Last 24 Hrs  T(C): 36.2 (07 Feb 2023 14:00), Max: 37 (07 Feb 2023 02:05)  T(F): 97.1 (07 Feb 2023 14:00), Max: 98.6 (07 Feb 2023 02:05)  HR: 66 (07 Feb 2023 13:53) (63 - 96)  BP: 94/65 (07 Feb 2023 13:53) (84/53 - 151/103)  BP(mean): 76 (07 Feb 2023 13:53) (64 - 119)  RR: 12 (07 Feb 2023 13:53) (11 - 20)  SpO2: 100% (07 Feb 2023 13:53) (91% - 100%)    Parameters below as of 07 Feb 2023 13:00  Patient On (Oxygen Delivery Method): room air        CONSTITUTIONAL: NAD, well-developed  HEENT: Bruising over jaw, swollen   RESPIRATORY: Normal respiratory effort; lungs are clear to auscultation bilaterally  CARDIOVASCULAR: Regular rate and rhythm, normal S1 and S2, no murmur/rub/gallop; No lower extremity edema; Peripheral pulses are 2+ bilaterally  ABDOMEN: Nontender to palpation, normoactive bowel sounds, no rebound/guarding; No hepatosplenomegaly  MUSCLOSKELETAL: no clubbing or cyanosis of digits; no joint swelling or tenderness to palpation  PSYCH: Lethargic, sleepy     LABS:                        9.4    3.14  )-----------( 139      ( 07 Feb 2023 03:25 )             28.0     02-07    144  |  106  |  <2<L>  ----------------------------<  85  4.2   |  33<H>  |  0.55    Ca    8.2<L>      07 Feb 2023 03:25  Phos  4.0     02-07  Mg     1.60     02-07      PT/INR - ( 07 Feb 2023 03:25 )   PT: 12.0 sec;   INR: 1.03 ratio         PTT - ( 07 Feb 2023 03:25 )  PTT:31.8 sec            RADIOLOGY & ADDITIONAL TESTS:  Results Reviewed:   Imaging Personally Reviewed:  Electrocardiogram Personally Reviewed:    COORDINATION OF CARE:  Care Discussed with Consultants/Other Providers [Y/N]:  Prior or Outpatient Records Reviewed [Y/N]:  
Berta Hopper MD  [facility] Division of Hospital Medicine  Pager: [phone]  Available via MS Teams    SUBJECTIVE / OVERNIGHT EVENTS:    pain well managed on meds     MEDICATIONS  (STANDING):  ampicillin/sulbactam  IVPB      ampicillin/sulbactam  IVPB 3 Gram(s) IV Intermittent every 6 hours  chlorhexidine 0.12% Liquid 15 milliLiter(s) Oral Mucosa two times a day  dextrose 5% + sodium chloride 0.45%. 1000 milliLiter(s) (60 mL/Hr) IV Continuous <Continuous>  diazepam    Tablet 20 milliGRAM(s) Oral <User Schedule>  diazepam    Tablet 10 milliGRAM(s) Oral <User Schedule>  FLUoxetine 40 milliGRAM(s) Oral daily  ibuprofen  Tablet. 600 milliGRAM(s) Oral every 6 hours  melatonin 3 milliGRAM(s) Oral at bedtime  traZODone 100 milliGRAM(s) Oral at bedtime  zolpidem 10 milliGRAM(s) Oral at bedtime    MEDICATIONS  (PRN):  HYDROmorphone  Injectable 0.5 milliGRAM(s) IV Push every 6 hours PRN Severe Pain (7 - 10)  HYDROmorphone  Injectable 0.5 milliGRAM(s) IV Push every 6 hours PRN Severe Pain (7 - 10)  oxyCODONE    IR 5 milliGRAM(s) Oral every 4 hours PRN Moderate Pain (4 - 6)      I&O's Summary    2023 07:01  -  2023 07:00  --------------------------------------------------------  IN: 2940 mL / OUT: 0 mL / NET: 2940 mL    2023 07:01  -  2023 16:27  --------------------------------------------------------  IN: 540 mL / OUT: 0 mL / NET: 540 mL        PHYSICAL EXAM:  Vital Signs Last 24 Hrs  T(C): 37.1 (2023 12:15), Max: 39.2 (2023 02:10)  T(F): 98.7 (2023 12:15), Max: 102.5 (2023 02:10)  HR: 107 (2023 10:22) (79 - 107)  BP: 107/76 (2023 10:22) (106/73 - 121/81)  BP(mean): 85 (2023 08:35) (85 - 85)  RR: 18 (2023 10:22) (18 - 20)  SpO2: 97% (2023 10:22) (97% - 99%)    Parameters below as of 2023 10:22  Patient On (Oxygen Delivery Method): room air      CONSTITUTIONAL: NAD, well-developed   EYES: conjunctiva and sclera clear  ENMT: Moist oral mucosa   NECK: Supple   RESPIRATORY: Normal respiratory effort; lungs are clear to auscultation bilaterally  CARDIOVASCULAR: Regular rate and rhythm, normal S1 and S2, no murmur/rub/gallop; Peripheral pulses are 2+ bilaterally  ABDOMEN: Nontender to palpation, normoactive bowel sounds, no rebound/guarding   MUSCULOSKELETAL: No lower extremity edema   PSYCH: A+O to person, place, and time; affect appropriate  NEUROLOGY: no gross sensory or motor deficits   SKIN: No rashes    LABS:                        11.8   5.56  )-----------( 189      ( 2023 07:49 )             34.8     02-04    137  |  96<L>  |  <2<L>  ----------------------------<  91  3.1<L>   |  32<H>  |  0.51    Ca    8.1<L>      2023 07:49  Phos  2.2     02-04  Mg     1.50     02-04      PT/INR - ( 2023 05:50 )   PT: 11.8 sec;   INR: 1.02 ratio         PTT - ( 2023 05:50 )  PTT:30.1 sec      Urinalysis Basic - ( 2023 03:38 )    Color: Colorless / Appearance: Clear / S.008 / pH: x  Gluc: x / Ketone: Negative  / Bili: Negative / Urobili: <2 mg/dL   Blood: x / Protein: Negative / Nitrite: Negative   Leuk Esterase: Negative / RBC: x / WBC x   Sq Epi: x / Non Sq Epi: x / Bacteria: x        COVID-19 PCR: NotDetec (2023 11:30)      RADIOLOGY & ADDITIONAL TESTS:  Other Results Reviewed Today: BMP with stable Cr, CBC with stable Hg     COORDINATION OF CARE:  Communication: discussed plan of care with ACP   
Interval hx:  HD5: Patient seen and evaluated at bedside this morning. Resting comfortably in bed, in NAD. NAEON. AVSS. Afebrile overnight. Mg 1.3 and K+ 3.8 this morning. 2g Mg repletion ordered.    HD4: Patient seen and evaluated at bedside this morning. Resting comfortably in bed, in NAD. AVSS. Afebrile overnight. Tolerating PO intake.     HD3: Patient seen and evaluated at bedside this morning. Resting comfortably in bed, in NAD. AVSS. Febrile to 102.5 last night, given tylenol. Now afebrile at 97.8. Tolerating PO intake.     HD2  patient visited bedside, resting comfortably. AVSS. NAEON. patient refused EKG overnight, will attempt again in morning. Patient planned for ORIF mandibular fractures today in the OR with Dr. Jordan. medicine and psych following. nutrition consulted. Patient was shivering this morning, warm blanekts given and titus hugger ordered.     HPI:   52 F transfer from [place] for OMFS care in the setting of known mandibular fracture status post fall overnight while walking to the bathroom.  Patient was in her usual state of health prior to this fall with no prodromal headache chest pain lightheadedness or shortness of breath.  Patient with jaw pain.  No headache neck pain back pain or joint pains.  Vision intact no nausea.       Patient is a 52y old  Female who presents with a chief complaint of mandibular fracture (03 Feb 2023 00:48)      PAST MEDICAL & SURGICAL HISTORY:  Anxiety      Endometriosis determined by laparoscopy      Strabismus      GERD (gastroesophageal reflux disease)      Diverticulitis  h/o      Osteoporosis      Lymphadenopathy      H/O alopecia      H/O heartburn      History of diverticulitis      Closed Colles&#x27; fracture of left radius with malunion, subsequent encounter      Endometriosis determined by laparoscopy  over 15 years ago      History of strabismus surgery  2x      S/P colon resection  july 2017      S/P appendectomy  july 2017        MEDICATIONS  (STANDING):  acetaminophen     Tablet .. 1000 milliGRAM(s) Oral every 6 hours  ampicillin/sulbactam  IVPB 3 Gram(s) IV Intermittent every 6 hours  ampicillin/sulbactam  IVPB      chlorhexidine 0.12% Liquid 15 milliLiter(s) Oral Mucosa two times a day  dextrose 5% + sodium chloride 0.45%. 1000 milliLiter(s) (60 mL/Hr) IV Continuous <Continuous>  diazepam    Tablet 20 milliGRAM(s) Oral <User Schedule>  diazepam    Tablet 10 milliGRAM(s) Oral <User Schedule>  FLUoxetine 40 milliGRAM(s) Oral daily  ibuprofen  Tablet. 600 milliGRAM(s) Oral every 6 hours  melatonin 3 milliGRAM(s) Oral at bedtime  traZODone 100 milliGRAM(s) Oral at bedtime  zolpidem 10 milliGRAM(s) Oral at bedtime    MEDICATIONS  (PRN):  HYDROmorphone  Injectable 0.5 milliGRAM(s) IV Push every 6 hours PRN Severe Pain (7 - 10)  oxyCODONE    IR 5 milliGRAM(s) Oral every 4 hours PRN Moderate Pain (4 - 6)    Allergies    predniSONE (Unknown)  Tree Nuts (Hives; Other)    Intolerances      FAMILY HISTORY:  Family history of heart disease  father  83      *SOCIAL HISTORY: Denies ETOH use, Tobacco, drugs    Review of systems:  denies fever, chills. denies LOC. denies nausea/vomiting/headache. denies CP, SOB, cough. Denies palpatitions. denies blurred/double vision. denies dyspahgia, dyspnea.    ICU Vital Signs Last 24 Hrs  T(C): 36.6 (04 Feb 2023 06:50), Max: 39.2 (04 Feb 2023 02:10)  T(F): 97.8 (04 Feb 2023 06:50), Max: 102.5 (04 Feb 2023 02:10)  HR: 82 (04 Feb 2023 02:10) (79 - 87)  BP: 109/79 (04 Feb 2023 06:50) (85/56 - 121/81)  BP(mean): --  ABP: --  ABP(mean): --  RR: 20 (04 Feb 2023 06:50) (17 - 20)  SpO2: 99% (04 Feb 2023 06:50) (96% - 99%)    O2 Parameters below as of 04 Feb 2023 06:50  Patient On (Oxygen Delivery Method): room air        I&O's Detail    03 Feb 2023 07:01  -  04 Feb 2023 07:00  --------------------------------------------------------  IN:    dextrose 5% + sodium chloride 0.45%: 1320 mL    IV PiggyBack: 800 mL    Oral Fluid: 820 mL  Total IN: 2940 mL    OUT:  Total OUT: 0 mL    Total NET: 2940 mL          PHYSICAL EXAM:    CONSTITUTIONAL: Well groomed, no apparent distress    EYES: PERRL and symmetric, EOMI, No conjunctival or scleral injection, non-icteric    ENMT:   Head: left chin laceration to mandible, laceration currently hemostatic  Eyes: EOMI, PERRL, visual acuity intact, no diplopia,  supra/infra orbital rims intact, no subconjunctival heme, no telecanthus, no exophthalmos  Ears: Gross hearing intact, No heme appreciated,   Nose: no crepitis/septal hematoma/asymmetry.  no Lacerations/abrasions/hematomas.  Malar: no malar depression, no CN V-2 paresthesia  Throat:  LAD, supple, FROM,     Extraoral/Intraoral Exam: TORO: 20 , Dentition grossly intact, no carious dentition, occlusion non-stable and non-reproducible, lingual anterior FOM hematoma present consistent with symphysis fracture, right sided mandibular step deformity from #27 distal, no CN V-3 paresthesia, no signs of infection, anterior edema and tenderness to palpation. FOM soft, NT. no mobility of maxilla/crepitis. TMJ: bilateral subcondylar fractures can be palpated    CN II-XII intact        LABS:                          14.5   7.01  )-----------( 261      ( 02 Feb 2023 11:30 )             40.2     02-02    139  |  99  |  15  ----------------------------<  85  3.5   |  30  |  0.89    Ca    8.5      02 Feb 2023 11:30    TPro  7.3  /  Alb  4.3  /  TBili  0.4  /  DBili  x   /  AST  36  /  ALT  30  /  AlkPhos  52  02-02      PT/INR - ( 02 Feb 2023 11:30 )   PT: 13.4 sec;   INR: 1.13 ratio         PTT - ( 02 Feb 2023 11:30 )  PTT:29.4 sec            IMAGING:    NTERPRETATION:  INDICATION:  Facial trauma. Jaw pain. Status post   trauma. Laceration.  TECHNIQUE:  Thin section axial and coronal CT imaging of the facial bones   was conducted.  In addition, coronal and sagittal reformations were   generated from the axial data.  3-D imaging was performed.    FINDINGS:    ORBITS:  No orbital fracture is depicted.  Both globes appear symmetric.    No intraconal or extraconal fluid collection or phlegmon is evident. The   extraocular muscles and optic nerves are of equal size bilaterally.    FACIAL BONES:   There are multiple fractures of the mandible. There is a   vertically oriented a fracturein the mental region of the mandible,   which is full-thickness that. There is no significant displacement, but   the fracture does angle posteriorly towards the left. Also noted are   nondisplaced fractures posteriorly involving the mandibular condyles. The   other facial bones appear to be intact that. There is no visible fracture   of the maxilla.    NASAL BONES:  no fracture is identified.    PARANASAL SINUSES:  clear    IMPRESSION:  Multiple fractures noted of the mandible, which appear to be   nondisplaced. This includes a full-thickness fracture in the mental   region of the mandible, in addition to nondisplaced fractures of both   proximal mandibular condyles. Both condylar heads remain within the TMJ.
Interval hx:  HD6: Patient seen and evaluated at bedside this morning. Resting comfortably in bed, in NAD. NAEON. AVSS. Afebrile overnight. Electrolytes optimized for OR this morning.     HD5: Patient seen and evaluated at bedside this morning. Resting comfortably in bed, in NAD. NAEON. AVSS. Afebrile overnight. Mg 1.3 and K+ 3.8 this morning. 2g Mg repletion ordered.    HD4: Patient seen and evaluated at bedside this morning. Resting comfortably in bed, in NAD. AVSS. Afebrile overnight. Tolerating PO intake.     HD3: Patient seen and evaluated at bedside this morning. Resting comfortably in bed, in NAD. AVSS. Febrile to 102.5 last night, given tylenol. Now afebrile at 97.8. Tolerating PO intake.     HD2  patient visited bedside, resting comfortably. AVSS. NAEON. patient refused EKG overnight, will attempt again in morning. Patient planned for ORIF mandibular fractures today in the OR with Dr. Jordan. medicine and psych following. nutrition consulted. Patient was shivering this morning, warm blanekts given and titus hugger ordered.     HPI:   52 F transfer from [facility] for OMFS care in the setting of known mandibular fracture status post fall overnight while walking to the bathroom.  Patient was in her usual state of health prior to this fall with no prodromal headache chest pain lightheadedness or shortness of breath.  Patient with jaw pain.  No headache neck pain back pain or joint pains.  Vision intact no nausea.       Patient is a 52y old  Female who presents with a chief complaint of mandibular fracture (03 Feb 2023 00:48)      PAST MEDICAL & SURGICAL HISTORY:  Anxiety      Endometriosis determined by laparoscopy      Strabismus      GERD (gastroesophageal reflux disease)      Diverticulitis  h/o      Osteoporosis      Lymphadenopathy      H/O alopecia      H/O heartburn      History of diverticulitis      Closed Colles&#x27; fracture of left radius with malunion, subsequent encounter      Endometriosis determined by laparoscopy  over 15 years ago      History of strabismus surgery  2x      S/P colon resection  july 2017      S/P appendectomy  july 2017        MEDICATIONS  (STANDING):  acetaminophen     Tablet .. 1000 milliGRAM(s) Oral every 6 hours  ampicillin/sulbactam  IVPB 3 Gram(s) IV Intermittent every 6 hours  ampicillin/sulbactam  IVPB      chlorhexidine 0.12% Liquid 15 milliLiter(s) Oral Mucosa two times a day  dextrose 5% + sodium chloride 0.45%. 1000 milliLiter(s) (60 mL/Hr) IV Continuous <Continuous>  diazepam    Tablet 20 milliGRAM(s) Oral <User Schedule>  diazepam    Tablet 10 milliGRAM(s) Oral <User Schedule>  FLUoxetine 40 milliGRAM(s) Oral daily  ibuprofen  Tablet. 600 milliGRAM(s) Oral every 6 hours  melatonin 3 milliGRAM(s) Oral at bedtime  traZODone 100 milliGRAM(s) Oral at bedtime  zolpidem 10 milliGRAM(s) Oral at bedtime    MEDICATIONS  (PRN):  HYDROmorphone  Injectable 0.5 milliGRAM(s) IV Push every 6 hours PRN Severe Pain (7 - 10)  oxyCODONE    IR 5 milliGRAM(s) Oral every 4 hours PRN Moderate Pain (4 - 6)    Allergies    predniSONE (Unknown)  Tree Nuts (Hives; Other)    Intolerances      FAMILY HISTORY:  Family history of heart disease  father  83      *SOCIAL HISTORY: Denies ETOH use, Tobacco, drugs    Review of systems:  denies fever, chills. denies LOC. denies nausea/vomiting/headache. denies CP, SOB, cough. Denies palpatitions. denies blurred/double vision. denies dyspahgia, dyspnea.    ICU Vital Signs Last 24 Hrs  T(C): 36.8 (07 Feb 2023 06:09), Max: 37 (07 Feb 2023 02:05)  T(F): 98.2 (07 Feb 2023 06:09), Max: 98.6 (07 Feb 2023 02:05)  HR: 86 (07 Feb 2023 06:09) (71 - 86)  BP: 104/67 (07 Feb 2023 06:09) (94/61 - 117/75)  BP(mean): --  ABP: --  ABP(mean): --  RR: 18 (07 Feb 2023 06:09) (16 - 18)  SpO2: 100% (07 Feb 2023 06:09) (91% - 100%)  I&O's Detail    06 Feb 2023 07:01  -  07 Feb 2023 07:00  --------------------------------------------------------  IN:    IV PiggyBack: 100 mL    Lactated Ringers: 352 mL    Oral Fluid: 630 mL  Total IN: 1082 mL    OUT:  Total OUT: 0 mL    Total NET: 1082 mL        O2 Parameters below as of 07 Feb 2023 06:08  Patient On (Oxygen Delivery Method): room air                PHYSICAL EXAM:    CONSTITUTIONAL: Well groomed, no apparent distress    EYES: PERRL and symmetric, EOMI, No conjunctival or scleral injection, non-icteric    ENMT:   Head: left chin laceration to mandible, laceration currently hemostatic  Eyes: EOMI, PERRL, visual acuity intact, no diplopia,  supra/infra orbital rims intact, no subconjunctival heme, no telecanthus, no exophthalmos  Ears: Gross hearing intact, No heme appreciated,   Nose: no crepitis/septal hematoma/asymmetry.  no Lacerations/abrasions/hematomas.  Malar: no malar depression, no CN V-2 paresthesia  Throat:  LAD, supple, FROM,     Extraoral/Intraoral Exam: TORO: 20 , Dentition grossly intact, no carious dentition, occlusion non-stable and non-reproducible, lingual anterior FOM hematoma present consistent with symphysis fracture, right sided mandibular step deformity from #27 distal, no CN V-3 paresthesia, no signs of infection, anterior edema and tenderness to palpation. FOM soft, NT. no mobility of maxilla/crepitis. TMJ: bilateral subcondylar fractures can be palpated    CN II-XII intact        LABS:                          14.5   7.01  )-----------( 261      ( 02 Feb 2023 11:30 )             40.2     02-02    139  |  99  |  15  ----------------------------<  85  3.5   |  30  |  0.89    Ca    8.5      02 Feb 2023 11:30    TPro  7.3  /  Alb  4.3  /  TBili  0.4  /  DBili  x   /  AST  36  /  ALT  30  /  AlkPhos  52  02-02      PT/INR - ( 02 Feb 2023 11:30 )   PT: 13.4 sec;   INR: 1.13 ratio         PTT - ( 02 Feb 2023 11:30 )  PTT:29.4 sec            IMAGING:    NTERPRETATION:  INDICATION:  Facial trauma. Jaw pain. Status post   trauma. Laceration.  TECHNIQUE:  Thin section axial and coronal CT imaging of the facial bones   was conducted.  In addition, coronal and sagittal reformations were   generated from the axial data.  3-D imaging was performed.    FINDINGS:    ORBITS:  No orbital fracture is depicted.  Both globes appear symmetric.    No intraconal or extraconal fluid collection or phlegmon is evident. The   extraocular muscles and optic nerves are of equal size bilaterally.    FACIAL BONES:   There are multiple fractures of the mandible. There is a   vertically oriented a fracturein the mental region of the mandible,   which is full-thickness that. There is no significant displacement, but   the fracture does angle posteriorly towards the left. Also noted are   nondisplaced fractures posteriorly involving the mandibular condyles. The   other facial bones appear to be intact that. There is no visible fracture   of the maxilla.    NASAL BONES:  no fracture is identified.    PARANASAL SINUSES:  clear    IMPRESSION:  Multiple fractures noted of the mandible, which appear to be   nondisplaced. This includes a full-thickness fracture in the mental   region of the mandible, in addition to nondisplaced fractures of both   proximal mandibular condyles. Both condylar heads remain within the TMJ.
Interval hx:  HD7: POD1 Patient seen and evaluated at bedside this morning. Resting comfortably in bed, in NAD. NAEON. AVSS. Afebrile overnight. Occlusion is stable and reproducible Pending labs and hospitalist recommendations.     HD6: Patient seen and evaluated at bedside this morning. Resting comfortably in bed, in NAD. NAEON. AVSS. Afebrile overnight. Electrolytes optimized for OR this morning.     HD5: Patient seen and evaluated at bedside this morning. Resting comfortably in bed, in NAD. NAEON. AVSS. Afebrile overnight. Mg 1.3 and K+ 3.8 this morning. 2g Mg repletion ordered.    HD4: Patient seen and evaluated at bedside this morning. Resting comfortably in bed, in NAD. AVSS. Afebrile overnight. Tolerating PO intake.     HD3: Patient seen and evaluated at bedside this morning. Resting comfortably in bed, in NAD. AVSS. Febrile to 102.5 last night, given tylenol. Now afebrile at 97.8. Tolerating PO intake.     HD2  patient visited bedside, resting comfortably. AVSS. NAEON. patient refused EKG overnight, will attempt again in morning. Patient planned for ORIF mandibular fractures today in the OR with Dr. Jordan. medicine and psych following. nutrition consulted. Patient was shivering this morning, warm blanekts given and titus hugger ordered.     HPI:   52 F transfer from [facility] for OMFS care in the setting of known mandibular fracture status post fall overnight while walking to the bathroom.  Patient was in her usual state of health prior to this fall with no prodromal headache chest pain lightheadedness or shortness of breath.  Patient with jaw pain.  No headache neck pain back pain or joint pains.  Vision intact no nausea.     Patient is a 52y old  Female who presents with a chief complaint of mandibular fracture (03 Feb 2023 00:48)    PAST MEDICAL & SURGICAL HISTORY:  Anxiety  Endometriosis determined by laparoscopy  Strabismus  GERD (gastroesophageal reflux disease)  Diverticulitis  Osteoporosis  Lymphadenopathy  Alopecia  Diverticulitis    PSH:  Closed Colles&#x27; fracture of left radius with malunion, subsequent encounter  Endometriosis determined by laparoscopyover 15 years ago  History of strabismus surgery X2  Colon resection July 2017  Appendectomy july 2017    MEDICATIONS  (STANDING):  acetaminophen     Tablet .. 1000 milliGRAM(s) Oral every 6 hours  ampicillin/sulbactam  IVPB 3 Gram(s) IV Intermittent every 6 hours  ampicillin/sulbactam  IVPB      chlorhexidine 0.12% Liquid 15 milliLiter(s) Oral Mucosa two times a day  dextrose 5% + sodium chloride 0.45%. 1000 milliLiter(s) (60 mL/Hr) IV Continuous <Continuous>  diazepam    Tablet 20 milliGRAM(s) Oral <User Schedule>  diazepam    Tablet 10 milliGRAM(s) Oral <User Schedule>  FLUoxetine 40 milliGRAM(s) Oral daily  ibuprofen  Tablet. 600 milliGRAM(s) Oral every 6 hours  melatonin 3 milliGRAM(s) Oral at bedtime  traZODone 100 milliGRAM(s) Oral at bedtime  zolpidem 10 milliGRAM(s) Oral at bedtime    MEDICATIONS  (PRN):  HYDROmorphone  Injectable 0.5 milliGRAM(s) IV Push every 6 hours PRN Severe Pain (7 - 10)  oxyCODONE    IR 5 milliGRAM(s) Oral every 4 hours PRN Moderate Pain (4 - 6)    Allergies  predniSONE (Unknown)  Tree Nuts (Hives; Other)    FAMILY HISTORY:  Family history of heart disease    *SOCIAL HISTORY: Denies ETOH use, Tobacco, drugs  Review of systems:  denies fever, chills. denies LOC. denies nausea/vomiting/headache. denies CP, SOB, cough. Denies palpatitions. denies blurred/double vision. denies dyspahgia, dyspnea.    PHYSICAL EXAM:  CONSTITUTIONAL: Well groomed, no apparent distress  EYES: PERRL and symmetric, EOMI, No conjunctival or scleral injection, non-icteric  ENMT:   Head: left chin laceration to mandible, laceration currently hemostatic  Eyes: EOMI, PERRL, visual acuity intact, no diplopia,  supra/infra orbital rims intact, no subconjunctival heme, no telecanthus, no exophthalmos  Ears: Gross hearing intact, No heme appreciated,   Nose: no crepitis/septal hematoma/asymmetry.  no Lacerations/abrasions/hematomas.  Malar: no malar depression, no CN V-2 paresthesia  Throat:  LAD, supple, FROM,     Extraoral/Intraoral Exam: TORO: 30 , Dentition grossly intact, no carious dentition, occlusion non-stable and non-reproducible, lingual anterior FOM hematoma present consistent with symphysis fracture, right sided mandibular step deformity, no CN V-3 paresthesia, no signs of infection, anterior edema and tenderness to palpation. FOM soft, NT. no mobility of maxilla/crepitis. TMJ: bilateral subcondylar fractures can be palpated  CN II-XII intact    LABS:  Vital Signs Last 24 Hrs  T(C): 36.5 (08 Feb 2023 02:29), Max: 36.5 (07 Feb 2023 07:30)  T(F): 97.7 (08 Feb 2023 02:29), Max: 97.7 (07 Feb 2023 07:30)  HR: 97 (08 Feb 2023 02:29) (58 - 97)  BP: 110/78 (08 Feb 2023 02:29) (84/53 - 151/103)  BP(mean): 85 (07 Feb 2023 16:00) (64 - 677)  RR: 17 (08 Feb 2023 02:29) (11 - 20)  SpO2: 96% (08 Feb 2023 02:29) (92% - 100%)    Parameters below as of 08 Feb 2023 02:29  Patient On (Oxygen Delivery Method): nasal cannula                          9.0    3.82  )-----------( 125      ( 07 Feb 2023 15:30 )             27.8   BMI: BMI (kg/m2): 16.6 (02-07-23 @ 07:42)  HbA1c:   Glucose:   BP: 110/78 (02-08-23 @ 02:29) (81/51 - 151/103)  Lipid Panel:       I&O's Summary    06 Feb 2023 07:01  -  07 Feb 2023 07:00  --------------------------------------------------------  IN: 1082 mL / OUT: 0 mL / NET: 1082 mL    07 Feb 2023 07:01  -  08 Feb 2023 06:43  --------------------------------------------------------  IN: 1270 mL / OUT: 750 mL / NET: 520 mL    IMAGING:    INTERPRETATION:  INDICATION:  Facial trauma. Jaw pain. Status post   trauma. Laceration.  TECHNIQUE:  Thin section axial and coronal CT imaging of the facial bones   was conducted.  In addition, coronal and sagittal reformations were   generated from the axial data.  3-D imaging was performed.    FINDINGS:    ORBITS:  No orbital fracture is depicted.  Both globes appear symmetric.    No intraconal or extraconal fluid collection or phlegmon is evident. The   extraocular muscles and optic nerves are of equal size bilaterally.    FACIAL BONES:   There are multiple fractures of the mandible. There is a   vertically oriented a fracturein the mental region of the mandible,   which is full-thickness that. There is no significant displacement, but   the fracture does angle posteriorly towards the left. Also noted are   nondisplaced fractures posteriorly involving the mandibular condyles. The   other facial bones appear to be intact that. There is no visible fracture   of the maxilla.    NASAL BONES:  no fracture is identified.    PARANASAL SINUSES:  clear    IMPRESSION:  Multiple fractures noted of the mandible, which appear to be   nondisplaced. This includes a full-thickness fracture in the mental   region of the mandible, in addition to nondisplaced fractures of both   proximal mandibular condyles. Both condylar heads remain within the TMJ.
Merlin Mathew, MD   Hospitalist  Pager #[phone]    PROGRESS NOTE:     Patient is a 52y old  Female who presents with a chief complaint of mandibular fracture (06 Feb 2023 06:35)      SUBJECTIVE / OVERNIGHT EVENTS: NEON   Patient reports she was not going to the OR 2/2 ?vitals   Has some pain in her jaw  Has not had a proper BM yet- had smear on TP  No black stools, hematuria or vaginal bleeding noted, no sig erythema/back or thigh pain.     ADDITIONAL REVIEW OF SYSTEMS:    MEDICATIONS  (STANDING):  ampicillin/sulbactam  IVPB      ampicillin/sulbactam  IVPB 3 Gram(s) IV Intermittent every 6 hours  chlorhexidine 0.12% Liquid 15 milliLiter(s) Oral Mucosa two times a day  diazepam    Tablet 20 milliGRAM(s) Oral <User Schedule>  diazepam    Tablet 10 milliGRAM(s) Oral <User Schedule>  FLUoxetine 40 milliGRAM(s) Oral daily  ketorolac   Injectable 15 milliGRAM(s) IV Push every 6 hours  lactated ringers. 1000 milliLiter(s) (88 mL/Hr) IV Continuous <Continuous>  melatonin 3 milliGRAM(s) Oral at bedtime  traZODone 100 milliGRAM(s) Oral at bedtime  zolpidem 10 milliGRAM(s) Oral at bedtime    MEDICATIONS  (PRN):  diazepam    Tablet 5 milliGRAM(s) Oral two times a day PRN Severe anxiety  HYDROmorphone  Injectable 0.5 milliGRAM(s) IV Push every 4 hours PRN Severe Pain (7 - 10)  oxyCODONE    IR 5 milliGRAM(s) Oral every 4 hours PRN Moderate Pain (4 - 6)  oxyCODONE    IR 7.5 milliGRAM(s) Oral every 4 hours PRN Severe Pain (7 - 10)      CAPILLARY BLOOD GLUCOSE        I&O's Summary    05 Feb 2023 07:01  -  06 Feb 2023 07:00  --------------------------------------------------------  IN: 1306 mL / OUT: 250 mL / NET: 1056 mL    06 Feb 2023 07:01  -  06 Feb 2023 13:35  --------------------------------------------------------  IN: 352 mL / OUT: 0 mL / NET: 352 mL        PHYSICAL EXAM:  Vital Signs Last 24 Hrs  T(C): 36.5 (06 Feb 2023 09:49), Max: 37.1 (05 Feb 2023 18:00)  T(F): 97.7 (06 Feb 2023 09:49), Max: 98.7 (05 Feb 2023 18:00)  HR: 80 (06 Feb 2023 09:49) (77 - 96)  BP: 107/75 (06 Feb 2023 09:49) (81/51 - 109/71)  BP(mean): --  RR: 18 (06 Feb 2023 09:49) (16 - 18)  SpO2: 97% (06 Feb 2023 09:49) (95% - 99%)    Parameters below as of 06 Feb 2023 09:49  Patient On (Oxygen Delivery Method): room air        CONSTITUTIONAL: NAD, well-developed female   EYES: conjunctiva and sclera clear, dysconjugate gaze at times   HEENT: Swollen jaw with gauze and tape over jaw, laceration appears C/D/I   RESPIRATORY: Normal respiratory effort; lungs are clear to auscultation bilaterally  CARDIOVASCULAR: Regular rate and rhythm, normal S1 and S2, no murmur/rub/gallop; Peripheral pulses are 2+ bilaterally  ABDOMEN: Nontender to palpation, normoactive bowel sounds, no rebound/guarding   MUSCULOSKELETAL: No lower extremity edema   PSYCH: A+O to person, place, and time; affect appropriate  NEUROLOGY: no gross sensory or motor deficits   SKIN: No rashes  LABS:                        9.9    4.73  )-----------( 141      ( 06 Feb 2023 05:00 )             28.4     02-06    139  |  101  |  2<L>  ----------------------------<  85  3.8   |  32<H>  |  0.53    Ca    8.2<L>      06 Feb 2023 05:00  Phos  2.2     02-06  Mg     1.30     02-06                  RADIOLOGY & ADDITIONAL TESTS:  Results Reviewed:   Imaging Personally Reviewed:  Electrocardiogram Personally Reviewed:    COORDINATION OF CARE:  Care Discussed with Consultants/Other Providers [Y/N]:  Prior or Outpatient Records Reviewed [Y/N]:  
Merlin Mathew, MD   Hospitalist  Pager #[phone]    PROGRESS NOTE:     Patient is a 52y old  Female who presents with a chief complaint of mandibular fracture (08 Feb 2023 08:20)      SUBJECTIVE / OVERNIGHT EVENTS: Patient seen in bed, lethargic on 3L NC   Reports some dyspnea, + cough and sputum production       ADDITIONAL REVIEW OF SYSTEMS:    MEDICATIONS  (STANDING):  acetaminophen    Suspension .. 650 milliGRAM(s) Oral every 6 hours  ampicillin/sulbactam  IVPB 3 Gram(s) IV Intermittent every 6 hours  ampicillin/sulbactam  IVPB      chlorhexidine 0.12% Liquid 15 milliLiter(s) Oral Mucosa two times a day  diazepam  Injectable 20 milliGRAM(s) IV Push <User Schedule>  diazepam  Injectable 10 milliGRAM(s) IV Push <User Schedule>  FLUoxetine 40 milliGRAM(s) Oral daily  ibuprofen  Suspension. 600 milliGRAM(s) Oral every 6 hours  lactated ringers. 1000 milliLiter(s) (40 mL/Hr) IV Continuous <Continuous>  melatonin 3 milliGRAM(s) Oral at bedtime  nicotine -  14 mG/24Hr(s) Patch 1 Patch Transdermal daily  polyethylene glycol 3350 17 Gram(s) Oral at bedtime  senna 2 Tablet(s) Oral at bedtime  traZODone 100 milliGRAM(s) Oral at bedtime  zolpidem 10 milliGRAM(s) Oral at bedtime    MEDICATIONS  (PRN):  oxyCODONE    Solution 5 milliGRAM(s) Oral every 4 hours PRN Moderate Pain (4 - 6)      CAPILLARY BLOOD GLUCOSE        I&O's Summary    07 Feb 2023 07:01  -  08 Feb 2023 07:00  --------------------------------------------------------  IN: 1530 mL / OUT: 1150 mL / NET: 380 mL    08 Feb 2023 07:01  -  08 Feb 2023 12:00  --------------------------------------------------------  IN: 410 mL / OUT: 0 mL / NET: 410 mL        PHYSICAL EXAM:  Vital Signs Last 24 Hrs  T(C): 36.3 (08 Feb 2023 09:09), Max: 36.7 (08 Feb 2023 06:00)  T(F): 97.3 (08 Feb 2023 09:09), Max: 98.1 (08 Feb 2023 06:00)  HR: 101 (08 Feb 2023 09:09) (58 - 101)  BP: 108/74 (08 Feb 2023 09:09) (84/53 - 151/103)  BP(mean): 85 (07 Feb 2023 16:00) (64 - 677)  RR: 18 (08 Feb 2023 09:09) (11 - 20)  SpO2: 96% (08 Feb 2023 09:09) (92% - 100%)    Parameters below as of 08 Feb 2023 09:09  Patient On (Oxygen Delivery Method): nasal cannula      CONSTITUTIONAL: NAD, well-developed  HEENT: Bruising over jaw, swelling in face    RESPIRATORY: Normal respiratory effort; lungs are clear to auscultation bilaterally on 4L NC   CARDIOVASCULAR: Regular rate and rhythm, normal S1 and S2, no murmur/rub/gallop; No lower extremity edema; Peripheral pulses are 2+ bilaterally  ABDOMEN: Nontender to palpation, normoactive bowel sounds, no rebound/guarding; No hepatosplenomegaly  MUSCULOSKELETAL: no clubbing or cyanosis of digits; no joint swelling or tenderness to palpation  PSYCH: Lethargic, sleepy       LABS:                        11.1   5.03  )-----------( 160      ( 08 Feb 2023 07:30 )             33.9     02-08    141  |  98  |  <2<L>  ----------------------------<  118<H>  4.1   |  34<H>  |  0.58    Ca    8.4      08 Feb 2023 08:12  Phos  3.8     02-08  Mg     1.20     02-08    TPro  5.5<L>  /  Alb  3.2<L>  /  TBili  0.2  /  DBili  <0.2  /  AST  14  /  ALT  8   /  AlkPhos  43  02-08    PT/INR - ( 07 Feb 2023 03:25 )   PT: 12.0 sec;   INR: 1.03 ratio         PTT - ( 07 Feb 2023 03:25 )  PTT:31.8 sec            RADIOLOGY & ADDITIONAL TESTS:  Results Reviewed:   Imaging Personally Reviewed:  Electrocardiogram Personally Reviewed:    COORDINATION OF CARE:  Care Discussed with Consultants/Other Providers [Y/N]: BRIGIDO Avila  Prior or Outpatient Records Reviewed [Y/N]:  
HD2  patient visited bedside, resting comfortably. JOSE MANUEL PANDA. patient refused EKG overnight, will attempt again in morning. Patient planned for ORIF mandibular fractures today in the OR with Dr. Jordan. medicine and psych following. nutrition consulted.     HPI:   52 F transfer from [place] for OMFS care in the setting of known mandibular fracture status post fall overnight while walking to the bathroom.  Patient was in her usual state of health prior to this fall with no prodromal headache chest pain lightheadedness or shortness of breath.  Patient with jaw pain.  No headache neck pain back pain or joint pains.  Vision intact no nausea.       Patient is a 52y old  Female who presents with a chief complaint of mandibular fracture (03 Feb 2023 00:48)      PAST MEDICAL & SURGICAL HISTORY:  Anxiety      Endometriosis determined by laparoscopy      Strabismus      GERD (gastroesophageal reflux disease)      Diverticulitis  h/o      Osteoporosis      Lymphadenopathy      H/O alopecia      H/O heartburn      History of diverticulitis      Closed Colles&#x27; fracture of left radius with malunion, subsequent encounter      Endometriosis determined by laparoscopy  over 15 years ago      History of strabismus surgery  2x      S/P colon resection  july 2017      S/P appendectomy  july 2017        MEDICATIONS  (STANDING):  acetaminophen     Tablet .. 1000 milliGRAM(s) Oral every 6 hours  ampicillin/sulbactam  IVPB 3 Gram(s) IV Intermittent every 6 hours  ampicillin/sulbactam  IVPB      chlorhexidine 0.12% Liquid 15 milliLiter(s) Oral Mucosa two times a day  dextrose 5% + sodium chloride 0.45%. 1000 milliLiter(s) (60 mL/Hr) IV Continuous <Continuous>  diazepam    Tablet 20 milliGRAM(s) Oral <User Schedule>  diazepam    Tablet 10 milliGRAM(s) Oral <User Schedule>  FLUoxetine 40 milliGRAM(s) Oral daily  ibuprofen  Tablet. 600 milliGRAM(s) Oral every 6 hours  melatonin 3 milliGRAM(s) Oral at bedtime  traZODone 100 milliGRAM(s) Oral at bedtime  zolpidem 10 milliGRAM(s) Oral at bedtime    MEDICATIONS  (PRN):  HYDROmorphone  Injectable 0.5 milliGRAM(s) IV Push every 6 hours PRN Severe Pain (7 - 10)  oxyCODONE    IR 5 milliGRAM(s) Oral every 4 hours PRN Moderate Pain (4 - 6)    Allergies    predniSONE (Unknown)  Tree Nuts (Hives; Other)    Intolerances      FAMILY HISTORY:  Family history of heart disease  father  83      *SOCIAL HISTORY: Denies ETOH use, Tobacco, drugs    Review of systems:  denies fever, chills. denies LOC. denies nausea/vomiting/headache. denies CP, SOB, cough. Denies palpatitions. denies blurred/double vision. denies dyspahgia, dyspnea.    T(F): 98.6 (02-03-23 @ 03:55), Max: 98.6 (02-03-23 @ 03:55)  HR: 80 (02-03-23 @ 03:55) (69 - 96)  BP: 121/82 (02-03-23 @ 03:55) (100/67 - 126/76)  RR: 18 (02-03-23 @ 03:55) (18 - 18)  SpO2: 100% (02-03-23 @ 03:55) (97% - 100%)    PHYSICAL EXAM:    CONSTITUTIONAL: Well groomed, no apparent distress    EYES: PERRL and symmetric, EOMI, No conjunctival or scleral injection, non-icteric    ENMT:   Head: left chin laceration to mandible, laceration currently hemostatic  Eyes: EOMI, PERRL, visual acuity intact, no diplopia,  supra/infra orbital rims intact, no subconjunctival heme, no telecanthus, no exophthalmos  Ears: Gross hearing intact, No heme appreciated,   Nose: no crepitis/septal hematoma/asymmetry.  no Lacerations/abrasions/hematomas.  Malar: no malar depression, no CN V-2 paresthesia  Throat:  LAD, supple, FROM,     Extraoral/Intraoral Exam: TORO: 20 , Dentition grossly intact, no carious dentition, occlusion non-stable and non-reproducible, lingual anterior FOM hematoma present consistent with symphysis fracture, right sided mandibular step deformity from #27 distal, no CN V-3 paresthesia, no signs of infection, anterior edema and tenderness to palpation. FOM soft, NT. no mobility of maxilla/crepitis. TMJ: bilateral subcondylar fractures can be palpated    CN II-XII intact        LABS:                          14.5   7.01  )-----------( 261      ( 02 Feb 2023 11:30 )             40.2     02-02    139  |  99  |  15  ----------------------------<  85  3.5   |  30  |  0.89    Ca    8.5      02 Feb 2023 11:30    TPro  7.3  /  Alb  4.3  /  TBili  0.4  /  DBili  x   /  AST  36  /  ALT  30  /  AlkPhos  52  02-02      PT/INR - ( 02 Feb 2023 11:30 )   PT: 13.4 sec;   INR: 1.13 ratio         PTT - ( 02 Feb 2023 11:30 )  PTT:29.4 sec            IMAGING:    NTERPRETATION:  INDICATION:  Facial trauma. Jaw pain. Status post   trauma. Laceration.  TECHNIQUE:  Thin section axial and coronal CT imaging of the facial bones   was conducted.  In addition, coronal and sagittal reformations were   generated from the axial data.  3-D imaging was performed.    FINDINGS:    ORBITS:  No orbital fracture is depicted.  Both globes appear symmetric.    No intraconal or extraconal fluid collection or phlegmon is evident. The   extraocular muscles and optic nerves are of equal size bilaterally.    FACIAL BONES:   There are multiple fractures of the mandible. There is a   vertically oriented a fracturein the mental region of the mandible,   which is full-thickness that. There is no significant displacement, but   the fracture does angle posteriorly towards the left. Also noted are   nondisplaced fractures posteriorly involving the mandibular condyles. The   other facial bones appear to be intact that. There is no visible fracture   of the maxilla.    NASAL BONES:  no fracture is identified.    PARANASAL SINUSES:  clear    IMPRESSION:  Multiple fractures noted of the mandible, which appear to be   nondisplaced. This includes a full-thickness fracture in the mental   region of the mandible, in addition to nondisplaced fractures of both   proximal mandibular condyles. Both condylar heads remain within the TMJ.
Interval hx:  HD4: Patient seen and evaluated at bedside this morning. Resting comfortably in bed, in NAD. AVSS. Afebrile overnight. Tolerating PO intake.     HD3: Patient seen and evaluated at bedside this morning. Resting comfortably in bed, in NAD. AVSS. Febrile to 102.5 last night, given tylenol. Now afebrile at 97.8. Tolerating PO intake.     HD2  patient visited bedside, resting comfortably. AVSS. NAEON. patient refused EKG overnight, will attempt again in morning. Patient planned for ORIF mandibular fractures today in the OR with Dr. Jordan. medicine and psych following. nutrition consulted. Patient was shivering this morning, warm blanekts given and titus hugger ordered.     HPI:   52 F transfer from [place] for FS care in the setting of known mandibular fracture status post fall overnight while walking to the bathroom.  Patient was in her usual state of health prior to this fall with no prodromal headache chest pain lightheadedness or shortness of breath.  Patient with jaw pain.  No headache neck pain back pain or joint pains.  Vision intact no nausea.       Patient is a 52y old  Female who presents with a chief complaint of mandibular fracture (03 Feb 2023 00:48)      PAST MEDICAL & SURGICAL HISTORY:  Anxiety      Endometriosis determined by laparoscopy      Strabismus      GERD (gastroesophageal reflux disease)      Diverticulitis  h/o      Osteoporosis      Lymphadenopathy      H/O alopecia      H/O heartburn      History of diverticulitis      Closed Colles&#x27; fracture of left radius with malunion, subsequent encounter      Endometriosis determined by laparoscopy  over 15 years ago      History of strabismus surgery  2x      S/P colon resection  july 2017      S/P appendectomy  july 2017        MEDICATIONS  (STANDING):  acetaminophen     Tablet .. 1000 milliGRAM(s) Oral every 6 hours  ampicillin/sulbactam  IVPB 3 Gram(s) IV Intermittent every 6 hours  ampicillin/sulbactam  IVPB      chlorhexidine 0.12% Liquid 15 milliLiter(s) Oral Mucosa two times a day  dextrose 5% + sodium chloride 0.45%. 1000 milliLiter(s) (60 mL/Hr) IV Continuous <Continuous>  diazepam    Tablet 20 milliGRAM(s) Oral <User Schedule>  diazepam    Tablet 10 milliGRAM(s) Oral <User Schedule>  FLUoxetine 40 milliGRAM(s) Oral daily  ibuprofen  Tablet. 600 milliGRAM(s) Oral every 6 hours  melatonin 3 milliGRAM(s) Oral at bedtime  traZODone 100 milliGRAM(s) Oral at bedtime  zolpidem 10 milliGRAM(s) Oral at bedtime    MEDICATIONS  (PRN):  HYDROmorphone  Injectable 0.5 milliGRAM(s) IV Push every 6 hours PRN Severe Pain (7 - 10)  oxyCODONE    IR 5 milliGRAM(s) Oral every 4 hours PRN Moderate Pain (4 - 6)    Allergies    predniSONE (Unknown)  Tree Nuts (Hives; Other)    Intolerances      FAMILY HISTORY:  Family history of heart disease  father  83      *SOCIAL HISTORY: Denies ETOH use, Tobacco, drugs    Review of systems:  denies fever, chills. denies LOC. denies nausea/vomiting/headache. denies CP, SOB, cough. Denies palpatitions. denies blurred/double vision. denies dyspahgia, dyspnea.    ICU Vital Signs Last 24 Hrs  T(C): 36.6 (04 Feb 2023 06:50), Max: 39.2 (04 Feb 2023 02:10)  T(F): 97.8 (04 Feb 2023 06:50), Max: 102.5 (04 Feb 2023 02:10)  HR: 82 (04 Feb 2023 02:10) (79 - 87)  BP: 109/79 (04 Feb 2023 06:50) (85/56 - 121/81)  BP(mean): --  ABP: --  ABP(mean): --  RR: 20 (04 Feb 2023 06:50) (17 - 20)  SpO2: 99% (04 Feb 2023 06:50) (96% - 99%)    O2 Parameters below as of 04 Feb 2023 06:50  Patient On (Oxygen Delivery Method): room air        I&O's Detail    03 Feb 2023 07:01  -  04 Feb 2023 07:00  --------------------------------------------------------  IN:    dextrose 5% + sodium chloride 0.45%: 1320 mL    IV PiggyBack: 800 mL    Oral Fluid: 820 mL  Total IN: 2940 mL    OUT:  Total OUT: 0 mL    Total NET: 2940 mL          PHYSICAL EXAM:    CONSTITUTIONAL: Well groomed, no apparent distress    EYES: PERRL and symmetric, EOMI, No conjunctival or scleral injection, non-icteric    ENMT:   Head: left chin laceration to mandible, laceration currently hemostatic  Eyes: EOMI, PERRL, visual acuity intact, no diplopia,  supra/infra orbital rims intact, no subconjunctival heme, no telecanthus, no exophthalmos  Ears: Gross hearing intact, No heme appreciated,   Nose: no crepitis/septal hematoma/asymmetry.  no Lacerations/abrasions/hematomas.  Malar: no malar depression, no CN V-2 paresthesia  Throat:  LAD, supple, FROM,     Extraoral/Intraoral Exam: TORO: 20 , Dentition grossly intact, no carious dentition, occlusion non-stable and non-reproducible, lingual anterior FOM hematoma present consistent with symphysis fracture, right sided mandibular step deformity from #27 distal, no CN V-3 paresthesia, no signs of infection, anterior edema and tenderness to palpation. FOM soft, NT. no mobility of maxilla/crepitis. TMJ: bilateral subcondylar fractures can be palpated    CN II-XII intact        LABS:                          14.5   7.01  )-----------( 261      ( 02 Feb 2023 11:30 )             40.2     02-02    139  |  99  |  15  ----------------------------<  85  3.5   |  30  |  0.89    Ca    8.5      02 Feb 2023 11:30    TPro  7.3  /  Alb  4.3  /  TBili  0.4  /  DBili  x   /  AST  36  /  ALT  30  /  AlkPhos  52  02-02      PT/INR - ( 02 Feb 2023 11:30 )   PT: 13.4 sec;   INR: 1.13 ratio         PTT - ( 02 Feb 2023 11:30 )  PTT:29.4 sec            IMAGING:    NTERPRETATION:  INDICATION:  Facial trauma. Jaw pain. Status post   trauma. Laceration.  TECHNIQUE:  Thin section axial and coronal CT imaging of the facial bones   was conducted.  In addition, coronal and sagittal reformations were   generated from the axial data.  3-D imaging was performed.    FINDINGS:    ORBITS:  No orbital fracture is depicted.  Both globes appear symmetric.    No intraconal or extraconal fluid collection or phlegmon is evident. The   extraocular muscles and optic nerves are of equal size bilaterally.    FACIAL BONES:   There are multiple fractures of the mandible. There is a   vertically oriented a fracturein the mental region of the mandible,   which is full-thickness that. There is no significant displacement, but   the fracture does angle posteriorly towards the left. Also noted are   nondisplaced fractures posteriorly involving the mandibular condyles. The   other facial bones appear to be intact that. There is no visible fracture   of the maxilla.    NASAL BONES:  no fracture is identified.    PARANASAL SINUSES:  clear    IMPRESSION:  Multiple fractures noted of the mandible, which appear to be   nondisplaced. This includes a full-thickness fracture in the mental   region of the mandible, in addition to nondisplaced fractures of both   proximal mandibular condyles. Both condylar heads remain within the TMJ.
[facility] Division of Hospital Medicine  Yola Garces MD  Pager [phone]      Patient is a 52y old  Female who presents with a chief complaint of mandibular fracture (09 Feb 2023 13:54)      SUBJECTIVE / OVERNIGHT EVENTS:    no acute event o/n. pt reports feeling better than yesterday. Weaned off O2, satting well on RA. cont to report facial pain. tolerating liquids     ADDITIONAL REVIEW OF SYSTEMS:    RESPIRATORY: No cough, wheezing, chills or hemoptysis; No shortness of breath  CARDIOVASCULAR: No chest pain, palpitations, dizziness, or leg swelling  GASTROINTESTINAL: No abdominal or epigastric pain. No nausea, vomiting, or hematemesis; No diarrhea or constipation. No melena or hematochezia.      MEDICATIONS  (STANDING):  acetaminophen    Suspension .. 650 milliGRAM(s) Oral every 6 hours  ampicillin/sulbactam  IVPB      ampicillin/sulbactam  IVPB 3 Gram(s) IV Intermittent every 6 hours  chlorhexidine 0.12% Liquid 15 milliLiter(s) Oral Mucosa two times a day  diazepam    Tablet 20 milliGRAM(s) Oral <User Schedule>  diazepam    Tablet 10 milliGRAM(s) Oral <User Schedule>  enoxaparin Injectable 40 milliGRAM(s) SubCutaneous every 24 hours  FLUoxetine 40 milliGRAM(s) Oral daily  ibuprofen  Suspension. 600 milliGRAM(s) Oral every 6 hours  melatonin 3 milliGRAM(s) Oral at bedtime  nicotine -  14 mG/24Hr(s) Patch 1 Patch Transdermal daily  polyethylene glycol 3350 17 Gram(s) Oral at bedtime  potassium chloride    Tablet ER 20 milliEquivalent(s) Oral once  senna 2 Tablet(s) Oral at bedtime  traZODone 100 milliGRAM(s) Oral at bedtime    MEDICATIONS  (PRN):  HYDROmorphone  Injectable 0.5 milliGRAM(s) IV Push every 6 hours PRN Severe Pain (7 - 10)  oxyCODONE    Solution 5 milliGRAM(s) Oral every 4 hours PRN Moderate Pain (4 - 6)  zolpidem 10 milliGRAM(s) Oral at bedtime PRN Insomnia      CAPILLARY BLOOD GLUCOSE        I&O's Summary    08 Feb 2023 07:01  -  09 Feb 2023 07:00  --------------------------------------------------------  IN: 1180 mL / OUT: 300 mL / NET: 880 mL    09 Feb 2023 07:01  -  09 Feb 2023 15:07  --------------------------------------------------------  IN: 250 mL / OUT: 0 mL / NET: 250 mL        PHYSICAL EXAM:  Vital Signs Last 24 Hrs  T(C): 36.4 (09 Feb 2023 10:00), Max: 37.2 (09 Feb 2023 06:00)  T(F): 97.5 (09 Feb 2023 10:00), Max: 98.9 (09 Feb 2023 06:00)  HR: 113 (09 Feb 2023 10:00) (68 - 113)  BP: 98/67 (09 Feb 2023 10:00) (96/62 - 133/88)  BP(mean): --  RR: 17 (09 Feb 2023 10:00) (17 - 18)  SpO2: 89% (09 Feb 2023 10:00) (89% - 99%)    Parameters below as of 09 Feb 2023 10:00  Patient On (Oxygen Delivery Method): room air        CONSTITUTIONAL: NAD,  EYES: PERRLA; conjunctiva and sclera clear  ENMT: mild facial swelling  NECK: Supple, no palpable masses;  RESPIRATORY: Normal respiratory effort;  basilar bilaterally  CARDIOVASCULAR: Regular rate and rhythm, normal S1 and S2, no murmur/rub/gallop; No lower extremity edema; Peripheral pulses are 2+ bilaterally  ABDOMEN: Nontender to palpation, normoactive bowel sounds, no rebound/guarding;   MUSCLOSKELETAL:   no clubbing or cyanosis of digits; no joint swelling or tenderness to palpation  PSYCH: A+O to person, place, and time; affect appropriate  NEUROLOGY: CN 2-12 are intact and symmetric; no gross sensory deficits;   SKIN: No rashes;     LABS:                        9.6    4.54  )-----------( 159      ( 09 Feb 2023 03:58 )             28.8     02-09    141  |  95<L>  |  3<L>  ----------------------------<  87  3.2<L>   |  41<H>  |  0.65    Ca    8.4      09 Feb 2023 03:58  Phos  3.0     02-09  Mg     1.60     02-09    TPro  5.5<L>  /  Alb  3.2<L>  /  TBili  0.2  /  DBili  <0.2  /  AST  14  /  ALT  8   /  AlkPhos  43  02-08                RADIOLOGY & ADDITIONAL TESTS:  Results Reviewed:   Imaging Personally Reviewed:  Electrocardiogram Personally Reviewed:    COORDINATION OF CARE:  Care Discussed with Consultants/Other Providers [Y/N]:  Prior or Outpatient Records Reviewed [Y/N]:  
Berta Hopper MD  [facility] Division of Hospital Medicine  Pager: [phone]  Available via MS Teams    SUBJECTIVE / OVERNIGHT EVENTS:    Pain controlled with pain meds     MEDICATIONS  (STANDING):  acetaminophen   IVPB .. 725 milliGRAM(s) IV Intermittent once  ampicillin/sulbactam  IVPB      ampicillin/sulbactam  IVPB 3 Gram(s) IV Intermittent every 6 hours  chlorhexidine 0.12% Liquid 15 milliLiter(s) Oral Mucosa two times a day  dextrose 5% + sodium chloride 0.45%. 1000 milliLiter(s) (60 mL/Hr) IV Continuous <Continuous>  diazepam    Tablet 20 milliGRAM(s) Oral <User Schedule>  diazepam    Tablet 10 milliGRAM(s) Oral <User Schedule>  FLUoxetine 40 milliGRAM(s) Oral daily  ketorolac   Injectable 15 milliGRAM(s) IV Push every 6 hours  melatonin 3 milliGRAM(s) Oral at bedtime  traZODone 100 milliGRAM(s) Oral at bedtime  zolpidem 10 milliGRAM(s) Oral at bedtime    MEDICATIONS  (PRN):  diazepam    Tablet 5 milliGRAM(s) Oral two times a day PRN Severe anxiety  HYDROmorphone  Injectable 0.5 milliGRAM(s) IV Push every 4 hours PRN Severe Pain (7 - 10)  oxyCODONE    IR 5 milliGRAM(s) Oral every 4 hours PRN Moderate Pain (4 - 6)  oxyCODONE    IR 7.5 milliGRAM(s) Oral every 4 hours PRN Severe Pain (7 - 10)      I&O's Summary    2023 07:  -  2023 07:00  --------------------------------------------------------  IN: 1820 mL / OUT: 0 mL / NET: 1820 mL    2023 07:  -  2023 18:15  --------------------------------------------------------  IN: 0 mL / OUT: 150 mL / NET: -150 mL        PHYSICAL EXAM:  Vital Signs Last 24 Hrs  T(C): 36.6 (2023 14:00), Max: 37.1 (2023 06:00)  T(F): 97.8 (2023 14:00), Max: 98.8 (2023 06:00)  HR: 87 (2023 14:00) (72 - 101)  BP: 93/62 (2023 14:00) (85/54 - 121/65)  BP(mean): --  RR: 16 (2023 14:00) (16 - 18)  SpO2: 97% (2023 14:00) (95% - 99%)    Parameters below as of 2023 14:00  Patient On (Oxygen Delivery Method): room air      CONSTITUTIONAL: NAD, well-developed   EYES: conjunctiva and sclera clear  ENMT: Moist oral mucosa   NECK: Supple   RESPIRATORY: Normal respiratory effort; lungs are clear to auscultation bilaterally  CARDIOVASCULAR: Regular rate and rhythm, normal S1 and S2, no murmur/rub/gallop; Peripheral pulses are 2+ bilaterally  ABDOMEN: Nontender to palpation, normoactive bowel sounds, no rebound/guarding   MUSCULOSKELETAL: No lower extremity edema   PSYCH: A+O to person, place, and time; affect appropriate  NEUROLOGY: no gross sensory or motor deficits   SKIN: No rashes    LABS:                        11.4   5.29  )-----------( 146      ( 2023 09:47 )             32.4     02-05    138  |  99  |  2<L>  ----------------------------<  101<H>  3.5   |  33<H>  |  0.53    Ca    8.1<L>      2023 09:47  Phos  3.0     02-05  Mg     1.40     02-05            Urinalysis Basic - ( 2023 03:38 )    Color: Colorless / Appearance: Clear / S.008 / pH: x  Gluc: x / Ketone: Negative  / Bili: Negative / Urobili: <2 mg/dL   Blood: x / Protein: Negative / Nitrite: Negative   Leuk Esterase: Negative / RBC: x / WBC x   Sq Epi: x / Non Sq Epi: x / Bacteria: x        COVID-19 PCR: NotDetec (2023 04:49)  COVID-19 PCR: NotDetec (2023 11:30)      RADIOLOGY & ADDITIONAL TESTS:  Other Results Reviewed Today: BMP with stable Cr, CBC with stable Hg     COORDINATION OF CARE:  Communication: discussed plan of care with ACP

## 2023-02-10 ENCOUNTER — TRANSCRIPTION ENCOUNTER (OUTPATIENT)
Age: 53
End: 2023-02-10

## 2023-02-10 VITALS
OXYGEN SATURATION: 99 % | HEART RATE: 74 BPM | TEMPERATURE: 97 F | SYSTOLIC BLOOD PRESSURE: 118 MMHG | DIASTOLIC BLOOD PRESSURE: 60 MMHG | RESPIRATION RATE: 18 BRPM

## 2023-02-10 LAB
ANION GAP SERPL CALC-SCNC: 8 MMOL/L — SIGNIFICANT CHANGE UP (ref 7–14)
BASOPHILS # BLD AUTO: 0.03 K/UL — SIGNIFICANT CHANGE UP (ref 0–0.2)
BASOPHILS NFR BLD AUTO: 0.7 % — SIGNIFICANT CHANGE UP (ref 0–2)
BUN SERPL-MCNC: 3 MG/DL — LOW (ref 7–23)
CALCIUM SERPL-MCNC: 8.7 MG/DL — SIGNIFICANT CHANGE UP (ref 8.4–10.5)
CHLORIDE SERPL-SCNC: 99 MMOL/L — SIGNIFICANT CHANGE UP (ref 98–107)
CO2 SERPL-SCNC: 35 MMOL/L — HIGH (ref 22–31)
CREAT SERPL-MCNC: 0.53 MG/DL — SIGNIFICANT CHANGE UP (ref 0.5–1.3)
EGFR: 111 ML/MIN/1.73M2 — SIGNIFICANT CHANGE UP
EOSINOPHIL # BLD AUTO: 0.18 K/UL — SIGNIFICANT CHANGE UP (ref 0–0.5)
EOSINOPHIL NFR BLD AUTO: 4.1 % — SIGNIFICANT CHANGE UP (ref 0–6)
GLUCOSE SERPL-MCNC: 81 MG/DL — SIGNIFICANT CHANGE UP (ref 70–99)
HCT VFR BLD CALC: 29.8 % — LOW (ref 34.5–45)
HGB BLD-MCNC: 10.1 G/DL — LOW (ref 11.5–15.5)
IANC: 2.84 K/UL — SIGNIFICANT CHANGE UP (ref 1.8–7.4)
IMM GRANULOCYTES NFR BLD AUTO: 0.2 % — SIGNIFICANT CHANGE UP (ref 0–0.9)
LYMPHOCYTES # BLD AUTO: 1.04 K/UL — SIGNIFICANT CHANGE UP (ref 1–3.3)
LYMPHOCYTES # BLD AUTO: 23.5 % — SIGNIFICANT CHANGE UP (ref 13–44)
MAGNESIUM SERPL-MCNC: 1.4 MG/DL — LOW (ref 1.6–2.6)
MCHC RBC-ENTMCNC: 31.1 PG — SIGNIFICANT CHANGE UP (ref 27–34)
MCHC RBC-ENTMCNC: 33.9 GM/DL — SIGNIFICANT CHANGE UP (ref 32–36)
MCV RBC AUTO: 91.7 FL — SIGNIFICANT CHANGE UP (ref 80–100)
MONOCYTES # BLD AUTO: 0.33 K/UL — SIGNIFICANT CHANGE UP (ref 0–0.9)
MONOCYTES NFR BLD AUTO: 7.4 % — SIGNIFICANT CHANGE UP (ref 2–14)
NEUTROPHILS # BLD AUTO: 2.84 K/UL — SIGNIFICANT CHANGE UP (ref 1.8–7.4)
NEUTROPHILS NFR BLD AUTO: 64.1 % — SIGNIFICANT CHANGE UP (ref 43–77)
NRBC # BLD: 0 /100 WBCS — SIGNIFICANT CHANGE UP (ref 0–0)
NRBC # FLD: 0 K/UL — SIGNIFICANT CHANGE UP (ref 0–0)
PHOSPHATE SERPL-MCNC: 2.8 MG/DL — SIGNIFICANT CHANGE UP (ref 2.5–4.5)
PLATELET # BLD AUTO: 188 K/UL — SIGNIFICANT CHANGE UP (ref 150–400)
POTASSIUM SERPL-MCNC: 3.5 MMOL/L — SIGNIFICANT CHANGE UP (ref 3.5–5.3)
POTASSIUM SERPL-SCNC: 3.5 MMOL/L — SIGNIFICANT CHANGE UP (ref 3.5–5.3)
RBC # BLD: 3.25 M/UL — LOW (ref 3.8–5.2)
RBC # FLD: 11.6 % — SIGNIFICANT CHANGE UP (ref 10.3–14.5)
SODIUM SERPL-SCNC: 142 MMOL/L — SIGNIFICANT CHANGE UP (ref 135–145)
WBC # BLD: 4.43 K/UL — SIGNIFICANT CHANGE UP (ref 3.8–10.5)
WBC # FLD AUTO: 4.43 K/UL — SIGNIFICANT CHANGE UP (ref 3.8–10.5)

## 2023-02-10 PROCEDURE — 99239 HOSP IP/OBS DSCHRG MGMT >30: CPT

## 2023-02-10 RX ORDER — SENNA PLUS 8.6 MG/1
2 TABLET ORAL
Qty: 0 | Refills: 0 | DISCHARGE
Start: 2023-02-10

## 2023-02-10 RX ORDER — POLYETHYLENE GLYCOL 3350 17 G/17G
17 POWDER, FOR SOLUTION ORAL
Qty: 0 | Refills: 0 | DISCHARGE
Start: 2023-02-10

## 2023-02-10 RX ORDER — CHLORHEXIDINE GLUCONATE 213 G/1000ML
15 SOLUTION TOPICAL
Qty: 1 | Refills: 0
Start: 2023-02-10 | End: 2023-02-14

## 2023-02-10 RX ORDER — SENNA PLUS 8.6 MG/1
2 TABLET ORAL
Qty: 10 | Refills: 0
Start: 2023-02-10 | End: 2023-02-14

## 2023-02-10 RX ORDER — FLUOXETINE HCL 10 MG
1 CAPSULE ORAL
Qty: 0 | Refills: 0 | DISCHARGE
Start: 2023-02-10

## 2023-02-10 RX ORDER — OXYCODONE HYDROCHLORIDE 5 MG/1
1 TABLET ORAL
Qty: 20 | Refills: 0
Start: 2023-02-10 | End: 2023-02-14

## 2023-02-10 RX ORDER — TRAZODONE HCL 50 MG
1 TABLET ORAL
Qty: 0 | Refills: 0 | DISCHARGE
Start: 2023-02-10

## 2023-02-10 RX ORDER — POLYETHYLENE GLYCOL 3350 17 G/17G
17 POWDER, FOR SOLUTION ORAL
Qty: 85 | Refills: 0
Start: 2023-02-10 | End: 2023-02-14

## 2023-02-10 RX ORDER — CHLORHEXIDINE GLUCONATE 213 G/1000ML
15 SOLUTION TOPICAL
Refills: 0 | Status: DISCONTINUED | OUTPATIENT
Start: 2023-02-10 | End: 2023-02-10

## 2023-02-10 RX ORDER — OXYCODONE HYDROCHLORIDE 5 MG/1
10 TABLET ORAL
Qty: 90 | Refills: 0
Start: 2023-02-10 | End: 2023-02-12

## 2023-02-10 RX ADMIN — Medication 650 MILLIGRAM(S): at 12:46

## 2023-02-10 RX ADMIN — Medication 20 MILLIGRAM(S): at 07:47

## 2023-02-10 RX ADMIN — Medication 40 MILLIGRAM(S): at 12:47

## 2023-02-10 RX ADMIN — ENOXAPARIN SODIUM 40 MILLIGRAM(S): 100 INJECTION SUBCUTANEOUS at 12:46

## 2023-02-10 RX ADMIN — CHLORHEXIDINE GLUCONATE 15 MILLILITER(S): 213 SOLUTION TOPICAL at 07:25

## 2023-02-10 RX ADMIN — Medication 650 MILLIGRAM(S): at 08:20

## 2023-02-10 RX ADMIN — OXYCODONE HYDROCHLORIDE 5 MILLIGRAM(S): 5 TABLET ORAL at 12:46

## 2023-02-10 RX ADMIN — AMPICILLIN SODIUM AND SULBACTAM SODIUM 200 GRAM(S): 250; 125 INJECTION, POWDER, FOR SUSPENSION INTRAMUSCULAR; INTRAVENOUS at 12:47

## 2023-02-10 RX ADMIN — Medication 10 MILLIGRAM(S): at 13:44

## 2023-02-10 RX ADMIN — OXYCODONE HYDROCHLORIDE 5 MILLIGRAM(S): 5 TABLET ORAL at 13:38

## 2023-02-10 RX ADMIN — AMPICILLIN SODIUM AND SULBACTAM SODIUM 200 GRAM(S): 250; 125 INJECTION, POWDER, FOR SUSPENSION INTRAMUSCULAR; INTRAVENOUS at 07:25

## 2023-02-10 RX ADMIN — AMPICILLIN SODIUM AND SULBACTAM SODIUM 200 GRAM(S): 250; 125 INJECTION, POWDER, FOR SUSPENSION INTRAMUSCULAR; INTRAVENOUS at 00:07

## 2023-02-10 RX ADMIN — Medication 650 MILLIGRAM(S): at 07:24

## 2023-02-10 RX ADMIN — HYDROMORPHONE HYDROCHLORIDE 0.5 MILLIGRAM(S): 2 INJECTION INTRAMUSCULAR; INTRAVENOUS; SUBCUTANEOUS at 07:24

## 2023-02-10 NOTE — DISCHARGE NOTE NURSING/CASE MANAGEMENT/SOCIAL WORK - PATIENT PORTAL LINK FT
You can access the FollowMyHealth Patient Portal offered by [facility] by registering at the following website: [email or web address]. By joining [facility]’s FollowMyHealth portal, you will also be able to view your health information using other applications (apps) compatible with our system.

## 2023-02-10 NOTE — BRIEF OPERATIVE NOTE - OPERATION/FINDINGS
Symphysis fracture of the mandible
Extraction of teeth #6,7,8,9,10,11,21,28,29 with alveoloplasty, biopsy of ventral tongue lesion

## 2023-02-10 NOTE — BRIEF OPERATIVE NOTE - NSICDXBRIEFPROCEDURE_GEN_ALL_CORE_FT
PROCEDURES:  Open reduction and internal fixation (ORIF) of fracture of mandible with wiring of denture 07-Feb-2023 14:09:32 ORIF of Symphysic fracture + Closed reduction of Bilateral condylar fractures + Repair of facial laceration Russel Scott  
PROCEDURES:  Teeth extractions 10-Feb-2023 11:55:13  Nany Benitez

## 2023-02-10 NOTE — CHART NOTE - NSCHARTNOTEFT_GEN_A_CORE
52F now 3 days s/p ORIF of parasymphysis fx, patient seen and evaluated at bedside. Resting comfortably in bed, in NAD. Occlusion is stable and reproducible. Patient was instructed on how to replace elastics at home. No further intervention indicated from an OMFS perspective while inpatient.     Plan:  - D/c med recs peridex 15ml BID x 7 days, augmentin 875mg BID x 7 days  - Pain control per primary  - Patient should follow up as an outpatient at [facility] in one week.     [facility]   Address: [id number] [place] [address]  Phone: [phone]    Rashad Avila DDS   Oral and Maxillofacial Surgery   Pager #[phone]  Available on [employer] Teams

## 2023-02-10 NOTE — BRIEF OPERATIVE NOTE - NSICDXBRIEFPREOP_GEN_ALL_CORE_FT
PRE-OP DIAGNOSIS:  Dental decay 10-Feb-2023 11:56:37  Nany Benitez  
PRE-OP DIAGNOSIS:  Multiple open mandibular fractures 07-Feb-2023 14:10:26  Russel Scott

## 2023-02-10 NOTE — CHART NOTE - NSCHARTNOTEFT_GEN_A_CORE
pt seen and examined. vitals, labs, PM&R consult note reviewed. pt is stable for discharge home today. total time spent on dc 37min

## 2023-05-14 ENCOUNTER — NON-APPOINTMENT (OUTPATIENT)
Age: 53
End: 2023-05-14

## 2023-05-20 ENCOUNTER — NON-APPOINTMENT (OUTPATIENT)
Age: 53
End: 2023-05-20

## 2023-06-14 NOTE — ED ADULT NURSE REASSESSMENT NOTE - NS ED NURSE REASSESS COMMENT FT1
Update on residential treatment centers     [facility]: Declined.  [facility]:Declined.  [facility]: Declined.  [facility]: Declined.  [facility] [place]:  Declined.   [facility]:  Declined.  [facility]:  called and [facility] said they received the residential referral and will be following up later today.  [facility]: Awaiting response.  [facility]: Not accepting new patients until the end of summer due to staffing.                 Break Coverage RN: Pt A&Ox4, respirations equal and unlabored. Pt c/o jaw pain. Medicated as EMR orders. VSS. IV patent. Safety maintained. Will continue to monitor.

## 2023-09-11 ENCOUNTER — APPOINTMENT (OUTPATIENT)
Age: 53
End: 2023-09-11

## 2024-01-17 ENCOUNTER — APPOINTMENT (OUTPATIENT)
Dept: MRI IMAGING | Facility: CLINIC | Age: 54
End: 2024-01-17
Payer: COMMERCIAL

## 2024-01-17 ENCOUNTER — OUTPATIENT (OUTPATIENT)
Dept: OUTPATIENT SERVICES | Facility: HOSPITAL | Age: 54
LOS: 1 days | End: 2024-01-17
Payer: COMMERCIAL

## 2024-01-17 DIAGNOSIS — Z90.49 ACQUIRED ABSENCE OF OTHER SPECIFIED PARTS OF DIGESTIVE TRACT: Chronic | ICD-10-CM

## 2024-01-17 DIAGNOSIS — Z98.890 OTHER SPECIFIED POSTPROCEDURAL STATES: Chronic | ICD-10-CM

## 2024-01-17 DIAGNOSIS — Z00.8 ENCOUNTER FOR OTHER GENERAL EXAMINATION: ICD-10-CM

## 2024-01-17 DIAGNOSIS — N80.9 ENDOMETRIOSIS, UNSPECIFIED: Chronic | ICD-10-CM

## 2024-01-17 PROCEDURE — 72148 MRI LUMBAR SPINE W/O DYE: CPT

## 2024-01-17 PROCEDURE — 72148 MRI LUMBAR SPINE W/O DYE: CPT | Mod: 26

## 2024-01-19 ENCOUNTER — TRANSCRIPTION ENCOUNTER (OUTPATIENT)
Age: 54
End: 2024-01-19

## 2024-08-14 NOTE — ASU DISCHARGE PLAN (ADULT/PEDIATRIC) - CONDITION AT DISCHARGE
Hematology progress note        DATE: 8/14/2024  PATIENT: Raj Spencer  YOB: 1954  MRN: [id number]  PCP: Aga Mendez MD  PRIMARY HEME/ONC PROVIDER: Dr. Carrington Gonzalez        Subjective: Patient is tolerating solid food well.          HISTORY OF PRESENT ILLNESS  Raj Spencer is a 69 year old male with history of hepatocellular carcinoma, was treated with debridement pneumonia when the volume that last on July 31st was admitted for worsening edema abdominal pain.  He was recently hospitalized at [facility] [place] in July for small bowel obstruction and was managed conservatively and was discharged after he was able to eat and advance diet.    Almost similar symptoms along with vomiting started over the past week and some diarrhea and hence he came to the ER.  Imaging studies are consistent with mid to distal bowel obstruction with fluid-filled bowel.  Surgical teams have been consulted, NG tube is in place, patient is MGUS, conservative measures are being implemented to relieve the obstruction.    Oncology consulted because of his history of hepatocellular carcinoma.      PAST MEDICAL HISTORY  Past Medical History:   Diagnosis Date    A-fib  (CMD)     BPH (benign prostatic hyperplasia)     Chronic insomnia     COPD (chronic obstructive pulmonary disease)  (CMD)     COVID-19 virus infection 11/10/2022    Depression     Emphysema, unspecified  (CMD)     Essential (primary) hypertension     Gastroesophageal reflux disease     Heroin abuse  (CMD)     History of esophagogastroduodenoscopy (EGD) 02/09/2021    Repeat EGD in 3 Years. (02/09/2024)    History of home oxygen therapy     HTN (hypertension)     Hx of hepatitis C     s/p treatment in 7/2019    Methadone dependence  (CMD)     Sepsis due to pneumonia  (CMD) 03/30/2022    Sleep apnea     Underweight         SURGICAL HISTORY  Past Surgical History:   Procedure Laterality Date    Abdomen surgery      Anes ercp w place endoscopic stent in [place]  pancreatic oscar  03/20/2020    Dr. Jamie Morton    Cholecystectomy      Ercp w/ sphicterotomy  03/20/2020    Dr. Jamie Morton    Gallbladder surgery      Gastrectomy      Service to gastroenterology      Stomach surgery          SOCIAL HISTORY  Social History     Tobacco Use    Smoking status: Former     Current packs/day: 0.50     Average packs/day: 0.5 packs/day for 50.1 years (25.0 ttl pk-yrs)     Types: Cigarettes     Start date: 7/21/1974    Smokeless tobacco: Never   Vaping Use    Vaping status: never used   Substance Use Topics    Alcohol use: Not Currently    Drug use: Yes     Types: Opioids, Marijuana     Comment: [facility]        FAMILY HISTORY    Family History   Problem Relation Age of Onset    Stroke Mother     Hypertension Mother     Stroke Sister     Hypertension Sister         ALLERGIES  ALLERGIES:  Patient has no known allergies.    MEDICATIONS  Medications Prior to Admission   Medication Sig Dispense Refill    albuterol 108 (90 Base) MCG/ACT inhaler Inhale 2 puffs into the lungs every 4 hours as needed for Shortness of Breath or Wheezing. 1 each 3    tamsulosin (FLOMAX) 0.4 MG Cap Take 1 capsule by mouth daily. 90 capsule 1    apixaBAN (Eliquis) 5 MG Tab Take 5 mg by mouth every 12 hours.      mirtazapine (REMERON SOL-TAB) 15 MG disintegrating tablet Take 1 tablet by mouth nightly. 30 tablet 3    buPROPion (WELLBUTRIN SR) 150 MG 12 hr tablet Take 1 tablet by mouth 2 times daily. 180 tablet 3    methaDONE 10 MG/ML solution Take 40 mg by mouth daily. 40 mg (= 4 mL)  [facility] TONY Cannon LPN   [address]  Phone:[phone]  Fax: [phone]      naLOXone (NARCAN) 4 MG/0.1ML nasal spray Spray the content of 1 device into 1 nostril. Call 911. May repeat with 2nd device in alternate nostril if no response in 2-3 minutes. 2 each 1       Current Facility-Administered Medications   Medication Dose Route Frequency Provider Last Rate Last Admin    [START ON 8/15/2024] pantoprazole  (PROTONIX) EC tablet 40 mg  40 mg Oral QAM AC Cally Amado,         vancomycin (FIRVANQ) 250 MG/5ML solution 125 mg  125 mg Oral 4x Daily Breanna Frazier MD        potassium CHLORIDE 20 mEq/100mL IVPB premix  20 mEq Intravenous Once Edwina Ott A, DO        acetaminophen (TYLENOL) tablet 650 mg  650 mg Oral Q4H PRN Edwina Ott A, DO   650 mg at 08/14/24 0526    Or    acetaminophen (TYLENOL) suppository 650 mg  650 mg Rectal Q4H PRN Edwina Ott A, DO        melatonin tablet 3 mg  3 mg Oral Nightly PRN Edwina Ott A, DO        docusate sodium-sennosides (SENOKOT S) 50-8.6 MG 1 tablet  1 tablet Oral BID PRN Edwina Ott A, DO        methaDONE oral solution 40 mg  40 mg Oral Daily Edwina Ott, DO   40 mg at 08/14/24 0822    simethicone (MYLICON) tablet 125 mg  125 mg Oral 4x Daily PRN Edwina Ott A, DO        tamsulosin (FLOMAX) capsule 0.4 mg  0.4 mg Oral Daily Edwina Ott A, DO   0.4 mg at 08/14/24 0821    naLOXone nasal (NARCAN) 4 MG/0.1ML (NO CHARGE) take home nasal spray kit   Nasal ED As Directed Cori Miranda, CARMELITA        mirtazapine (REMERON SOL-TAB) disintegrating tablet 15 mg  15 mg Oral Nightly Edwina Ott A, DO   15 mg at 08/13/24 2026    heparin (porcine) injection 5,000 Units  5,000 Units Subcutaneous 3 times per day Edwina Ott A, DO   5,000 Units at 08/13/24 2026    ondansetron (ZOFRAN) injection 4 mg  4 mg Intravenous Q6H PRN Edwina Ott A, DO   4 mg at 08/12/24 0407    sodium chloride 0.9 % flush bag 25 mL  25 mL Intravenous PRN Edwina Ott,         sodium chloride 0.9 % injection 2 mL  2 mL Intracatheter 2 times per day Edwina Ott DO   2 mL at 08/14/24 0829    sodium chloride (NORMAL SALINE) 0.9 % bolus 500 mL  500 mL Intravenous PRN Edwina Ott DO        Potassium Standard Replacement Protocol (Levels 3.5 and lower)   Does not apply See Admin Instructions Namrata  Edwina CLEMENTS DO        Potassium Replacement (Levels 3.6 - 4)   Does not apply See Admin Instructions Edwina Ott DO        Magnesium Standard Replacement Protocol   Does not apply See Admin Instructions Edwina Ott DO        morphine injection 2 mg  2 mg Intravenous Q6H PRN Edwina Ott DO   2 mg at 08/10/24 0856       REVIEW OF SYSTEMS  Review of Systems   Constitutional:  Positive for activity change, appetite change and fatigue.   Gastrointestinal:  Positive for abdominal pain, diarrhea and vomiting.         PHYSICAL EXAM  Temp:  [97.5 °F (36.4 °C)-98.4 °F (36.9 °C)] 97.5 °F (36.4 °C)  Heart Rate:  [57-92] 57  Resp:  [18] 18  BP: (110-121)/(67-77) 110/70     Physical Exam  Constitutional:       General: He is not in acute distress.     Appearance: He is well-developed. He is ill-appearing.   HENT:      Head: Normocephalic and atraumatic.      Comments: Significant cachecia.     Mouth/Throat:      Pharynx: No oropharyngeal exudate.      Comments: NG tube connected to suction and draining bilious fluid.     Neck: Neck supple.   Eyes:      General: No scleral icterus.        Right eye: No discharge.         Left eye: No discharge.      Conjunctiva/sclera: Conjunctivae normal.   Neck:      Thyroid: No thyromegaly.   Pulmonary:      Effort: Pulmonary effort is normal. No respiratory distress.      Breath sounds: Normal breath sounds. No wheezing or rales.   Chest:      Chest wall: No tenderness.   Abdominal:      General: Bowel sounds are normal. There is no distension.      Palpations: Abdomen is soft. There is no mass.      Tenderness: There is no abdominal tenderness. There is no guarding or rebound.   Musculoskeletal:      Right lower leg: No edema.      Left lower leg: No edema.   Lymphadenopathy:      Cervical: No cervical adenopathy.   Skin:     General: Skin is warm and dry.      Coloration: Skin is not pale.      Findings: No erythema or rash.   Neurological:      Mental Status:  He is alert and oriented to person, place, and time.      Coordination: Coordination normal.   Psychiatric:         Behavior: Behavior normal.         Thought Content: Thought content normal.         Judgment: Judgment normal.            LABS:  Recent Labs   Lab 08/13/24  0441 08/12/24  0433 08/11/24  0444 08/10/24  0427 08/09/24  0433 07/29/24  1235 07/23/24  0523 07/21/24  0552   WBC 8.4 10.6 8.9   < > 11.1*   < > 6.2 5.6   HGB 12.0* 11.6* 11.8*   < > 12.4*   < > 14.0 13.4   HCT 37.2* 36.0* 36.8*   < > 38.4*   < > 42.3 40.4   MCV 82.5 82.9 83.6   < > 82.8   < > 81.2 82.6   * 512* 544*   < > 576*   < > 375 335   Absolute Neutrophils  --   --   --   --  7.7  --  3.3 3.1    < > = values in this interval not displayed.     Recent Labs   Lab 08/13/24  0441 08/11/24  0444 08/10/24  0427   Sodium 138   < > 138   Potassium 3.8   < > 3.9   Chloride 107   < > 103   BUN 3*   < > 13   Creatinine 0.60*   < > 0.59*   Glucose 99   < > 92   Calcium 8.1*   < > 8.4   Albumin  --   --  1.8*   Globulin  --   --  4.6*   Bilirubin, Total  --   --  0.3   GOT/AST  --   --  11   Alkaline Phosphatase  --   --  109   GPT  --   --  14    < > = values in this interval not displayed.   No results for input(s): \"LDH\" in the last 8765 hours.        IMPRESSION/PLAN:    Small bowel obstruction:   No transition point on imaging and as a result surgery not planning on surgical intervention.  Has resolved with conservative measures.     Hepatocellular carcinoma:   Advanced and unresectable, on third line treatment with nivolumab and ipilimumab.   Obviously treatment on hold until he recovers.   He will follow-up with me in the clinic on discharge.         Thank you for allowing me to participate in the care of the patient. If I can be of further help, please don't hesitate to contact me.      Lyssa Kelley MD  [facility]    This medical document is intended for peer to peer communication.  It is written in medical language that  may contain abbreviations or verbiage that are unfamiliar.  It may appear blunt or direct. This document  was completed using a voice recognition software.  Grammatical errors, random word insertions, pronoun errors and incomplete sentences can be an occasional consequence of this system due to software limitations, ambient noise and hardware issues. Portions of this note have also been copied to ensure clinical continually and have again been edited where necessary.  Any formal questions or concerns about the text or information contained in this note should be directly addressed to the provider for clarification.    Stable

## 2024-08-27 NOTE — PRE-OP CHECKLIST - TEMPERATURE IN FAHRENHEIT (DEGREES F)
[employer] message sent informing that MRI brain has been scheduled.  Provided imaging scheduling number if scheduled date is not convenient for patient.    Tracey Warner RN     98.2

## 2024-11-15 NOTE — ED PROVIDER NOTE - CARE PLAN
"Patient Education     Hypothyroidism (underactive thyroid)   The Basics   Written by the doctors and editors at [facility]   What is hypothyroidism? -- Hypothyroidism happens when a gland in your neck, called the thyroid gland, makes too little thyroid hormone. This hormone controls how the body uses and stores energy (figure 1).  With a different condition, hyperthyroidism, the thyroid gland makes too much thyroid hormone. With hypothyroidism, it does not make enough. Doctors sometimes also use the term \"underactive thyroid.\"  What causes hypothyroidism? -- Different things can cause the thyroid gland to be unable to make enough thyroid hormone. These include:   Problems with the immune system - The immune system is the body's infection-fighting system. Sometimes, a person's immune system attacks healthy cells, such as cells in the thyroid. This is called \"chronic autoimmune thyroiditis\" or \"Hashimoto's thyroiditis.\" It is the most common cause of hypothyroidism in the .   Thyroidectomy - This is surgery to remove the thyroid gland.   Radioiodine therapy - This is a treatment used for hyperthyroidism. It often causes hypothyroidism because it destroys part of the thyroid gland.   Radiation in the neck area - High doses of radiation (for example, to treat cancer) can damage the thyroid gland.   Certain medicines  The treatment of hypothyroidism is the same no matter what caused it.  What are the symptoms of hypothyroidism? -- Some people with hypothyroidism have no symptoms. But most people feel tired. That can make it hard to know if a person has it, because a lot of conditions can make you tired.  Other symptoms of hypothyroidism include:   Lack of energy   Getting cold easily   Developing coarse or thin hair   Getting constipated (having too few bowel movements)  If it is not treated, hypothyroidism can also weaken and slow your heart. This can make you feel out of breath or tired when you exercise. It can also " cause swelling (fluid buildup) in your ankles. Untreated hypothyroidism can also increase your blood pressure and raise your cholesterol. Both of these things increase the risk of heart problems.  Hypothyroidism can disrupt monthly periods. It can also make it hard to get pregnant. In people who do get pregnant, hypothyroidism can cause problems. For instance, it can increase the chances of having a miscarriage. (A miscarriage is when a pregnancy ends on its own before 20 weeks.)  Is there a test for hypothyroidism? -- Yes. Your doctor or nurse can check for hypothyroidism using a simple blood test.  How is hypothyroidism treated? -- Treatment involves taking thyroid hormone pills every day. After you take the pills for about 6 weeks, your doctor or nurse will test your blood again. This is to make sure that the levels are where they should be. They might adjust your dose depending on the results. Most people with hypothyroidism need to keep taking thyroid pills for the rest of their life. This gives your body the right level of the hormone that it cannot make on its own.  Thyroid hormone pills come in different brand name and generic forms. All of the pills work equally well. If possible, stick with the same generic or brand name. But switching between pills does not cause problems for most people. Talk to your doctor or nurse if you want to switch for some reason.  Never change your dose of thyroid hormone on your own. Taking too much thyroid hormone can cause heart rhythm problems and even damage your bones.  What if I want to get pregnant? -- You can try to get pregnant. Many people with hypothyroidism have healthy pregnancies. But your doctor or nurse will most likely need to change your dose of thyroid hormone once you are pregnant. That's because you need more thyroid hormone during pregnancy. They will also measure your levels of thyroid hormone 4 weeks after any change in your dose, and at least once during  each trimester of pregnancy.  All topics are updated as new evidence becomes available and our peer review process is complete.  This topic retrieved from [employer] on: Feb 26, 2024.  Topic 15663 Version 11.0  Release: 32.2.4 - C32.56  © 2024 UpToDate, Inc. and/or its affiliates. All rights reserved.  figure 1: Thyroid and parathyroid glands     The thyroid is a butterfly-shaped gland in the middle of the neck. It sits just below the larynx (voice box). The thyroid makes 2 hormones, called T3 and T4, which control how the body uses and stores energy. The parathyroid glands are 4 small glands behind the thyroid. They make a hormone called parathyroid hormone, which helps control the amount of calcium in the blood.  Graphic 73107 Version 10.0  Consumer Information Use and Disclaimer   Disclaimer: This generalized information is a limited summary of diagnosis, treatment, and/or medication information. It is not meant to be comprehensive and should be used as a tool to help the user understand and/or assess potential diagnostic and treatment options. It does NOT include all information about conditions, treatments, medications, side effects, or risks that may apply to a specific patient. It is not intended to be medical advice or a substitute for the medical advice, diagnosis, or treatment of a health care provider based on the health care provider's examination and assessment of a patient's specific and unique circumstances. Patients must speak with a health care provider for complete information about their health, medical questions, and treatment options, including any risks or benefits regarding use of medications. This information does not endorse any treatments or medications as safe, effective, or approved for treating a specific patient. UpToDate, Inc. and its affiliates disclaim any warranty or liability relating to this information or the use thereof.The use of this information is governed by the Terms of Use,  available at https://www.woltersViewexuwer.com/en/know/clinical-effectiveness-terms. 2024© [employer] and its affiliates and/or licensors. All rights reserved.  Copyright   © 2024 [employer] and/or its affiliates. All rights reserved.     1 Principal Discharge DX:	Abdominal pain

## 2025-03-01 NOTE — PROGRESS NOTE ADULT - CRITICAL CARE SERVICES
No care due was identified.  [facility] Embedded Care Due Messages. Reference number: [id number].   3/01/2025 1:19:03 PM CST  
35
50

## 2025-06-18 NOTE — ED ADULT TRIAGE NOTE - BP NONINVASIVE DIASTOLIC (MM HG)
A catheter was exchanged for a (CATHETER OD6 FR L100 CM RADOPQ BRAID SLCT JL3.5 CRV COR FEM) catheter. 88

## 2025-09-28 PROBLEM — L85.8 KERATOACANTHOMA OF LOWER LEG: Status: ACTIVE | Noted: 2025-09-28

## (undated) DEVICE — SUT VICRYL 4-0 27" RB-1 UNDYED

## (undated) DEVICE — PACK DENTAL MINOR

## (undated) DEVICE — BLADE SURGICAL #15 CARBON

## (undated) DEVICE — DRAPE 3/4 SHEET 52X76"

## (undated) DEVICE — DRSG DERMABOND PRINEO 42CM

## (undated) DEVICE — BRA JAW

## (undated) DEVICE — TWIST DRILL 1.5MM DIA X 50MM W/NOTCH SGL USE ONLY

## (undated) DEVICE — SUT CHROMIC 3-0 27" RB-1

## (undated) DEVICE — DRAPE SURGICAL #1010

## (undated) DEVICE — DRAPE TOWEL BLUE 17" X 24"

## (undated) DEVICE — BIPOLAR FORCEP STRYKER STANDARD 7" X 0.5MM (YELLOW)

## (undated) DEVICE — TEMPLATE LOKG PLATE MANDIBULAR ANGL 3H 2/2.5MM

## (undated) DEVICE — WARMING BLANKET FULL ADULT

## (undated) DEVICE — POSITIONER FOAM EGG CRATE ULNAR 2PCS (PINK)

## (undated) DEVICE — PREP BETADINE SPONGE STICKS

## (undated) DEVICE — APPLICATOR COTTON TIP 6" STERLE

## (undated) DEVICE — VENODYNE/SCD SLEEVE CALF MEDIUM